# Patient Record
Sex: FEMALE | Race: WHITE | NOT HISPANIC OR LATINO | Employment: FULL TIME | URBAN - METROPOLITAN AREA
[De-identification: names, ages, dates, MRNs, and addresses within clinical notes are randomized per-mention and may not be internally consistent; named-entity substitution may affect disease eponyms.]

---

## 2017-06-20 ENCOUNTER — TRANSCRIBE ORDERS (OUTPATIENT)
Dept: ADMINISTRATIVE | Facility: HOSPITAL | Age: 52
End: 2017-06-20

## 2017-06-20 DIAGNOSIS — K31.89 DYSPEPSIA AND OTHER SPECIFIED DISORDERS OF FUNCTION OF STOMACH: ICD-10-CM

## 2017-06-20 DIAGNOSIS — R10.13 DYSPEPSIA AND OTHER SPECIFIED DISORDERS OF FUNCTION OF STOMACH: ICD-10-CM

## 2017-06-20 DIAGNOSIS — R10.11 ABDOMINAL PAIN, RIGHT UPPER QUADRANT: Primary | ICD-10-CM

## 2017-06-22 ENCOUNTER — HOSPITAL ENCOUNTER (OUTPATIENT)
Dept: ULTRASOUND IMAGING | Facility: HOSPITAL | Age: 52
Discharge: HOME | End: 2017-06-22
Attending: INTERNAL MEDICINE
Payer: COMMERCIAL

## 2017-06-22 DIAGNOSIS — R10.11 ABDOMINAL PAIN, RIGHT UPPER QUADRANT: ICD-10-CM

## 2017-06-22 DIAGNOSIS — R10.13 DYSPEPSIA AND OTHER SPECIFIED DISORDERS OF FUNCTION OF STOMACH: ICD-10-CM

## 2017-06-22 DIAGNOSIS — K31.89 DYSPEPSIA AND OTHER SPECIFIED DISORDERS OF FUNCTION OF STOMACH: ICD-10-CM

## 2017-06-22 PROCEDURE — 76705 ECHO EXAM OF ABDOMEN: CPT

## 2017-09-27 ENCOUNTER — TRANSCRIBE ORDERS (OUTPATIENT)
Dept: ADMINISTRATIVE | Facility: HOSPITAL | Age: 52
End: 2017-09-27

## 2017-09-27 DIAGNOSIS — E04.1 THYROID NODULE: Primary | ICD-10-CM

## 2017-09-29 ENCOUNTER — HOSPITAL ENCOUNTER (OUTPATIENT)
Dept: ULTRASOUND IMAGING | Facility: HOSPITAL | Age: 52
Discharge: HOME | End: 2017-09-29
Payer: COMMERCIAL

## 2017-09-29 DIAGNOSIS — E04.1 THYROID NODULE: ICD-10-CM

## 2017-09-29 PROCEDURE — 76536 US EXAM OF HEAD AND NECK: CPT

## 2018-06-27 ENCOUNTER — TELEPHONE (OUTPATIENT)
Dept: OBGYN CLINIC | Facility: HOSPITAL | Age: 53
End: 2018-06-27

## 2018-06-27 NOTE — TELEPHONE ENCOUNTER
Patient has not been seen since 2016  She does not have any recent BMP to check renal function  She can talk to her PCP about a refill  Please call the patient and let her know  Thank you

## 2018-06-27 NOTE — TELEPHONE ENCOUNTER
Pt contacted Call Center requested refill of their medication  Medication Name: Meloxicam      Dosage of Med: 7 5 mg      Remaining Medication: 3 pills      Pharmacy and Location: [facility] Correct in chart    Thank you

## 2018-08-10 ENCOUNTER — OFFICE VISIT (OUTPATIENT)
Dept: OBGYN CLINIC | Facility: CLINIC | Age: 53
End: 2018-08-10
Payer: COMMERCIAL

## 2018-08-10 VITALS
WEIGHT: 180 LBS | SYSTOLIC BLOOD PRESSURE: 137 MMHG | BODY MASS INDEX: 33.13 KG/M2 | DIASTOLIC BLOOD PRESSURE: 80 MMHG | HEART RATE: 77 BPM | HEIGHT: 62 IN

## 2018-08-10 DIAGNOSIS — M70.62 TROCHANTERIC BURSITIS OF LEFT HIP: Primary | ICD-10-CM

## 2018-08-10 DIAGNOSIS — M24.152 DEGENERATIVE TEAR OF ACETABULAR LABRUM OF LEFT HIP: ICD-10-CM

## 2018-08-10 PROCEDURE — 99213 OFFICE O/P EST LOW 20 MIN: CPT | Performed by: ORTHOPAEDIC SURGERY

## 2018-08-10 RX ORDER — MELOXICAM 15 MG/1
15 TABLET ORAL DAILY
COMMUNITY
End: 2020-10-07

## 2018-08-10 RX ORDER — MULTIVIT-MIN/IRON/FOLIC ACID/K 18-600-40
4000 CAPSULE ORAL DAILY
COMMUNITY
End: 2021-04-06 | Stop reason: DRUGHIGH

## 2018-08-10 NOTE — PROGRESS NOTES
45 y o female comes in for left hip pain  Patient does have a history of left labrum tear with hip dysplasia  Patient reports increased pain about a month ago localizing to the lateral aspect  Patient notes stabbing pain in the left hip when walking,standing or driving  She describes a popping episode with improved pain thereafter  Patient is currently taking meloxicam for pain  Review of Systems  Review of systems negative unless otherwise specified in HPI    Past Medical History  Past Medical History:   Diagnosis Date    Joint pain        Past Surgical History  Past Surgical History:   Procedure Laterality Date     SECTION N/A 104,5205       Current Medications  No current outpatient prescriptions on file prior to visit  No current facility-administered medications on file prior to visit  Recent Labs (HCT,HGB,PT,INR,ESR,CRP,GLU,HgA1C)  No results found for: HCT, HGB, WBC, PT, INR, ESR, CRP, GLUCOSE, HGBA1C      Physical exam  Left  hip:  · No gross deformity  · Tenderness to palpation trochanteric bursa  · Range of motion is full  · FADDIR test is mildly positive  · JOLYNN test is mildly positive  · Passive supine rotation test is mildly positive  · Straight leg raise against resistance is mildly positive  · 4/5 strength hip flexion    Constitutional:  Well-developed and well-nourished  Eyes:  Anicteric sclerae  Neck:  Supple  Lungs:  Unlabored breathing  Cardiovascular:  Capillary refill is less than 2 seconds  Skin:  Intact without erythema  Neurologic:  Sensation intact to light touch  Psychiatric:  Mood and affect are appropriate  Imaging  I have reviewed imagines to PACS  X-rays left hip 16: Moderate dysplasia with minimal degenerative changes  MRI left hip 10/10/16: Degenerative tear of the acetabular labrum  Procedure  N/A    Assessment/Plan:   45 y  o female degenerative rear of the acetabular labrum tear left  Activity as tolerated  Continue with anti inflammatories  Consider steroid injection (bursa or intra-articular as indicated) or referral to PT if pain worsens  Follow up PRN

## 2018-09-14 ENCOUNTER — OFFICE VISIT (OUTPATIENT)
Dept: OBGYN CLINIC | Facility: CLINIC | Age: 53
End: 2018-09-14
Payer: COMMERCIAL

## 2018-09-14 VITALS
HEART RATE: 81 BPM | SYSTOLIC BLOOD PRESSURE: 151 MMHG | DIASTOLIC BLOOD PRESSURE: 84 MMHG | WEIGHT: 180 LBS | HEIGHT: 62 IN | BODY MASS INDEX: 33.13 KG/M2

## 2018-09-14 DIAGNOSIS — M70.62 TROCHANTERIC BURSITIS OF LEFT HIP: Primary | ICD-10-CM

## 2018-09-14 PROCEDURE — 20610 DRAIN/INJ JOINT/BURSA W/O US: CPT | Performed by: ORTHOPAEDIC SURGERY

## 2018-09-14 PROCEDURE — 99214 OFFICE O/P EST MOD 30 MIN: CPT | Performed by: ORTHOPAEDIC SURGERY

## 2018-09-14 RX ORDER — BUPIVACAINE HYDROCHLORIDE 2.5 MG/ML
4 INJECTION, SOLUTION INFILTRATION; PERINEURAL
Status: COMPLETED | OUTPATIENT
Start: 2018-09-14 | End: 2018-09-14

## 2018-09-14 RX ORDER — METHYLPREDNISOLONE ACETATE 40 MG/ML
1 INJECTION, SUSPENSION INTRA-ARTICULAR; INTRALESIONAL; INTRAMUSCULAR; SOFT TISSUE
Status: COMPLETED | OUTPATIENT
Start: 2018-09-14 | End: 2018-09-14

## 2018-09-14 RX ORDER — MESALAMINE 400 MG/1
CAPSULE, DELAYED RELEASE ORAL
COMMUNITY
End: 2018-09-14

## 2018-09-14 RX ADMIN — BUPIVACAINE HYDROCHLORIDE 4 ML: 2.5 INJECTION, SOLUTION INFILTRATION; PERINEURAL at 15:30

## 2018-09-14 RX ADMIN — METHYLPREDNISOLONE ACETATE 1 ML: 40 INJECTION, SUSPENSION INTRA-ARTICULAR; INTRALESIONAL; INTRAMUSCULAR; SOFT TISSUE at 15:30

## 2018-09-14 NOTE — PROGRESS NOTES
Patient Name:  Maria Eugenia Isaac  MRN:  [id number]    Assessment & Plan    Left hip trochanteric bursitis  1  Cortisone injection performed today into the left hip trochanteric bursa  2  Activities as tolerated with modification to avoid pain  3  Follow-up as needed  Briefly discussed left hip image guided intra-articular cortisone injection with Radiology if pain persists distal   Patient may call to arrange this injection      Subjective    57-year-old female returns to the office today for follow-up regarding her left hip  She notes persistent left hip pain localized primarily to the lateral aspect  She notes radiation distally along the lateral thigh into the knee  Pain is worse with prolonged sitting and driving  Pain is improved with rest ice and heat  No numbness or tingling  No weakness or instability  No fevers or chills  General ROS:  Negative for fever, lethargy/malaise, or night sweats  Neurological ROS:  Negative for confusion or numbness/tingling  Objective    /84   Pulse 81   Ht 5' 2" (1 575 m)   Wt 81 6 kg (180 lb)   BMI 32 92 kg/m²     Left hip:  No gross deformity  No tenderness to palpation anterior hip  Tenderness to palpation noted trochanteric bursa  Hip range of motion is intact and nearly full in all planes with discomfort noted laterally with internal and external rotation  Pain noted laterally with JOLYNN and FADDIR test    Negative logroll test   Negative straight leg raise and resisted straight leg raise test   Sensation intact left lower extremity    Appropriate mood and affect    Large joint arthrocentesis  Date/Time: 9/14/2018 3:30 PM  Supporting Documentation  Indications: pain   Procedure Details  Location: hip - L greater trochanteric bursa  Needle size: 22 G  Ultrasound guidance: no  Approach: lateral  Medications administered: 4 mL bupivacaine 0 25 %; 1 mL methylPREDNISolone acetate 40 mg/mL    Patient tolerance: patient tolerated the procedure well with no immediate complications  Dressing:  Sterile dressing applied        Scribe Attestation    I,:   Nick Mchugh PA-C am acting as a scribe while in the presence of the attending physician :        I,:   Skylar Bland MD personally performed the services described in this documentation    as scribed in my presence :

## 2018-11-29 ENCOUNTER — TELEPHONE (OUTPATIENT)
Dept: OBGYN CLINIC | Facility: HOSPITAL | Age: 53
End: 2018-11-29

## 2018-11-29 DIAGNOSIS — M24.152 DEGENERATIVE TEAR OF ACETABULAR LABRUM OF LEFT HIP: Primary | ICD-10-CM

## 2018-11-29 NOTE — TELEPHONE ENCOUNTER
Patient is calling stating that the steroid injection she had on 9/14/18 only gave her short term relief  Patient is stating that the doctor told her if it didn't work he would send her for the us guided injection & that the patient does not need to make an appt to see him, that he would just put the order in her chart  Please let the patient know when the order is in the chart & directions on how to schedule   [phone]

## 2018-12-10 ENCOUNTER — HOSPITAL ENCOUNTER (OUTPATIENT)
Dept: RADIOLOGY | Facility: HOSPITAL | Age: 53
Discharge: HOME | End: 2018-12-10
Payer: COMMERCIAL

## 2018-12-10 DIAGNOSIS — M24.152 DEGENERATIVE TEAR OF ACETABULAR LABRUM OF LEFT HIP: ICD-10-CM

## 2018-12-10 PROCEDURE — 77002 NEEDLE LOCALIZATION BY XRAY: CPT

## 2018-12-10 PROCEDURE — 20610 DRAIN/INJ JOINT/BURSA W/O US: CPT

## 2018-12-10 RX ORDER — METHYLPREDNISOLONE ACETATE 80 MG/ML
80 INJECTION, SUSPENSION INTRA-ARTICULAR; INTRALESIONAL; INTRAMUSCULAR; SOFT TISSUE
Status: COMPLETED | OUTPATIENT
Start: 2018-12-10 | End: 2018-12-10

## 2018-12-10 RX ORDER — BUPIVACAINE HYDROCHLORIDE 2.5 MG/ML
30 INJECTION, SOLUTION EPIDURAL; INFILTRATION; INTRACAUDAL
Status: COMPLETED | OUTPATIENT
Start: 2018-12-10 | End: 2018-12-10

## 2018-12-10 RX ORDER — LIDOCAINE HYDROCHLORIDE 10 MG/ML
10 INJECTION, SOLUTION EPIDURAL; INFILTRATION; INTRACAUDAL; PERINEURAL
Status: COMPLETED | OUTPATIENT
Start: 2018-12-10 | End: 2018-12-10

## 2018-12-10 RX ADMIN — BUPIVACAINE HYDROCHLORIDE 3 ML: 2.5 INJECTION, SOLUTION EPIDURAL; INFILTRATION; INTRACAUDAL at 12:41

## 2018-12-10 RX ADMIN — LIDOCAINE HYDROCHLORIDE 8 ML: 10 INJECTION, SOLUTION EPIDURAL; INFILTRATION; INTRACAUDAL; PERINEURAL at 12:43

## 2018-12-10 RX ADMIN — IOHEXOL 3 ML: 300 INJECTION, SOLUTION INTRAVENOUS at 12:41

## 2018-12-10 RX ADMIN — METHYLPREDNISOLONE ACETATE 80 MG: 80 INJECTION, SUSPENSION INTRA-ARTICULAR; INTRALESIONAL; INTRAMUSCULAR; SOFT TISSUE at 12:43

## 2019-02-12 ENCOUNTER — TRANSCRIBE ORDERS (OUTPATIENT)
Dept: ADMINISTRATIVE | Facility: HOSPITAL | Age: 54
End: 2019-02-12

## 2019-02-12 DIAGNOSIS — R10.9 STOMACH ACHE: Primary | ICD-10-CM

## 2019-02-14 ENCOUNTER — HOSPITAL ENCOUNTER (OUTPATIENT)
Dept: ULTRASOUND IMAGING | Facility: HOSPITAL | Age: 54
Discharge: HOME | End: 2019-02-14
Attending: SURGERY
Payer: COMMERCIAL

## 2019-02-14 DIAGNOSIS — R10.9 STOMACH ACHE: ICD-10-CM

## 2019-02-14 PROCEDURE — 76705 ECHO EXAM OF ABDOMEN: CPT

## 2019-05-08 ENCOUNTER — APPOINTMENT (EMERGENCY)
Dept: RADIOLOGY | Facility: HOSPITAL | Age: 54
End: 2019-05-08
Payer: COMMERCIAL

## 2019-05-08 ENCOUNTER — HOSPITAL ENCOUNTER (EMERGENCY)
Facility: HOSPITAL | Age: 54
Discharge: HOME | End: 2019-05-08
Attending: EMERGENCY MEDICINE | Admitting: EMERGENCY MEDICINE
Payer: COMMERCIAL

## 2019-05-08 VITALS
DIASTOLIC BLOOD PRESSURE: 67 MMHG | OXYGEN SATURATION: 97 % | TEMPERATURE: 98 F | RESPIRATION RATE: 16 BRPM | SYSTOLIC BLOOD PRESSURE: 161 MMHG | HEART RATE: 70 BPM

## 2019-05-08 DIAGNOSIS — R42 EPISODE OF DIZZINESS: Primary | ICD-10-CM

## 2019-05-08 LAB
ALBUMIN SERPL BCP-MCNC: 3.6 G/DL (ref 3.5–5)
ALP SERPL-CCNC: 86 U/L (ref 46–116)
ALT SERPL W P-5'-P-CCNC: 35 U/L (ref 12–78)
ANION GAP SERPL CALCULATED.3IONS-SCNC: 10 MMOL/L (ref 4–13)
AST SERPL W P-5'-P-CCNC: 20 U/L (ref 5–45)
ATRIAL RATE: 81 BPM
BASOPHILS # BLD AUTO: 0.05 THOUSANDS/ΜL (ref 0–0.1)
BASOPHILS NFR BLD AUTO: 1 % (ref 0–1)
BILIRUB SERPL-MCNC: 0.2 MG/DL (ref 0.2–1)
BUN SERPL-MCNC: 16 MG/DL (ref 5–25)
CALCIUM SERPL-MCNC: 8.7 MG/DL (ref 8.3–10.1)
CHLORIDE SERPL-SCNC: 105 MMOL/L (ref 100–108)
CO2 SERPL-SCNC: 26 MMOL/L (ref 21–32)
CREAT SERPL-MCNC: 1.11 MG/DL (ref 0.6–1.3)
EOSINOPHIL # BLD AUTO: 0.09 THOUSAND/ΜL (ref 0–0.61)
EOSINOPHIL NFR BLD AUTO: 1 % (ref 0–6)
ERYTHROCYTE [DISTWIDTH] IN BLOOD BY AUTOMATED COUNT: 11.8 % (ref 11.6–15.1)
GFR SERPL CREATININE-BSD FRML MDRD: 56 ML/MIN/1.73SQ M
GLUCOSE SERPL-MCNC: 162 MG/DL (ref 65–140)
HCT VFR BLD AUTO: 41.8 % (ref 34.8–46.1)
HGB BLD-MCNC: 13.9 G/DL (ref 11.5–15.4)
IMM GRANULOCYTES # BLD AUTO: 0.02 THOUSAND/UL (ref 0–0.2)
IMM GRANULOCYTES NFR BLD AUTO: 0 % (ref 0–2)
LYMPHOCYTES # BLD AUTO: 1.71 THOUSANDS/ΜL (ref 0.6–4.47)
LYMPHOCYTES NFR BLD AUTO: 26 % (ref 14–44)
MCH RBC QN AUTO: 32 PG (ref 26.8–34.3)
MCHC RBC AUTO-ENTMCNC: 33.3 G/DL (ref 31.4–37.4)
MCV RBC AUTO: 96 FL (ref 82–98)
MONOCYTES # BLD AUTO: 0.44 THOUSAND/ΜL (ref 0.17–1.22)
MONOCYTES NFR BLD AUTO: 7 % (ref 4–12)
NEUTROPHILS # BLD AUTO: 4.19 THOUSANDS/ΜL (ref 1.85–7.62)
NEUTS SEG NFR BLD AUTO: 65 % (ref 43–75)
NRBC BLD AUTO-RTO: 0 /100 WBCS
P AXIS: 47 DEGREES
PLATELET # BLD AUTO: 232 THOUSANDS/UL (ref 149–390)
PMV BLD AUTO: 9.8 FL (ref 8.9–12.7)
POTASSIUM SERPL-SCNC: 3.6 MMOL/L (ref 3.5–5.3)
PR INTERVAL: 182 MS
PROT SERPL-MCNC: 6.9 G/DL (ref 6.4–8.2)
QRS AXIS: 5 DEGREES
QRSD INTERVAL: 78 MS
QT INTERVAL: 356 MS
QTC INTERVAL: 406 MS
RBC # BLD AUTO: 4.35 MILLION/UL (ref 3.81–5.12)
SODIUM SERPL-SCNC: 141 MMOL/L (ref 136–145)
T WAVE AXIS: 37 DEGREES
TROPONIN I SERPL-MCNC: <0.02 NG/ML
TROPONIN I SERPL-MCNC: <0.02 NG/ML
TSH SERPL DL<=0.05 MIU/L-ACNC: 3.14 UIU/ML (ref 0.36–3.74)
VENTRICULAR RATE: 78 BPM
WBC # BLD AUTO: 6.5 THOUSAND/UL (ref 4.31–10.16)

## 2019-05-08 PROCEDURE — 85025 COMPLETE CBC W/AUTO DIFF WBC: CPT | Performed by: PHYSICIAN ASSISTANT

## 2019-05-08 PROCEDURE — 96360 HYDRATION IV INFUSION INIT: CPT

## 2019-05-08 PROCEDURE — 93005 ELECTROCARDIOGRAM TRACING: CPT

## 2019-05-08 PROCEDURE — 80053 COMPREHEN METABOLIC PANEL: CPT | Performed by: PHYSICIAN ASSISTANT

## 2019-05-08 PROCEDURE — 84443 ASSAY THYROID STIM HORMONE: CPT | Performed by: PHYSICIAN ASSISTANT

## 2019-05-08 PROCEDURE — 84484 ASSAY OF TROPONIN QUANT: CPT | Performed by: PHYSICIAN ASSISTANT

## 2019-05-08 PROCEDURE — 99284 EMERGENCY DEPT VISIT MOD MDM: CPT

## 2019-05-08 PROCEDURE — 99283 EMERGENCY DEPT VISIT LOW MDM: CPT | Performed by: PHYSICIAN ASSISTANT

## 2019-05-08 PROCEDURE — 71046 X-RAY EXAM CHEST 2 VIEWS: CPT

## 2019-05-08 PROCEDURE — 93010 ELECTROCARDIOGRAM REPORT: CPT | Performed by: INTERNAL MEDICINE

## 2019-05-08 PROCEDURE — 36415 COLL VENOUS BLD VENIPUNCTURE: CPT | Performed by: PHYSICIAN ASSISTANT

## 2019-05-08 RX ADMIN — SODIUM CHLORIDE 1000 ML: 0.9 INJECTION, SOLUTION INTRAVENOUS at 02:36

## 2019-08-22 ENCOUNTER — TRANSCRIBE ORDERS (OUTPATIENT)
Dept: ADMINISTRATIVE | Facility: HOSPITAL | Age: 54
End: 2019-08-22

## 2019-08-22 DIAGNOSIS — K82.9 DISEASE OF GALLBLADDER: Primary | ICD-10-CM

## 2019-12-07 ENCOUNTER — HOSPITAL ENCOUNTER (OUTPATIENT)
Dept: ULTRASOUND IMAGING | Facility: HOSPITAL | Age: 54
Discharge: HOME | End: 2019-12-07
Attending: SURGERY
Payer: COMMERCIAL

## 2019-12-07 DIAGNOSIS — K82.9 DISEASE OF GALLBLADDER: ICD-10-CM

## 2019-12-07 PROCEDURE — 76705 ECHO EXAM OF ABDOMEN: CPT

## 2020-08-19 ENCOUNTER — TELEPHONE (OUTPATIENT)
Dept: OBGYN CLINIC | Facility: HOSPITAL | Age: 55
End: 2020-08-19

## 2020-08-19 NOTE — TELEPHONE ENCOUNTER
As it has been well over 1 year would be preferable to have the patient come in repeat evaluation see if she needs the greater trochanteric injection versus the intra-articular injection

## 2020-08-19 NOTE — TELEPHONE ENCOUNTER
Patient sees Dr Namrata Pimentel for her left hip  In Sept 2018 she had a cortisone injection and in Dec 2018 she had a FL HIP injection  She's asking for another FL injection  Does she have to come in for a f/u visit first or can an order for the injection be placed?     Callback #[phone]

## 2020-08-28 ENCOUNTER — OFFICE VISIT (OUTPATIENT)
Dept: OBGYN CLINIC | Facility: CLINIC | Age: 55
End: 2020-08-28
Payer: COMMERCIAL

## 2020-08-28 VITALS
SYSTOLIC BLOOD PRESSURE: 139 MMHG | HEIGHT: 62 IN | BODY MASS INDEX: 33.13 KG/M2 | WEIGHT: 180 LBS | HEART RATE: 78 BPM | DIASTOLIC BLOOD PRESSURE: 83 MMHG

## 2020-08-28 DIAGNOSIS — M24.152 DEGENERATIVE TEAR OF ACETABULAR LABRUM OF LEFT HIP: Primary | ICD-10-CM

## 2020-08-28 PROCEDURE — 99214 OFFICE O/P EST MOD 30 MIN: CPT | Performed by: ORTHOPAEDIC SURGERY

## 2020-08-28 NOTE — PROGRESS NOTES
Patient Name:  Toro Gutierrez  MRN:  [id number]    Assessment & Plan     Left hip degenerative labral tear with recent exacerbation  1  Recommended repeat intra-articular corticosteroid injection left hip   2  OTC NSAIDs and activity modification as needed  3  Follow up on an as needed basis    History of the Present Illness     61 y/o female presents today with chief complaint of left hip pain  She reports recurrent pain in her left hip a couple months ago, worsening over that time  Patient reports complete resolution of her left hip pain after an intra-articular corticosteroid injection on 12/10/2018  Pain localizes to the anterior groin and are exacerbated by weight bearing activity  No recent treatment since the pain recurred  General ROS:  Negative for fever or chills  Neurological ROS:  Negative for numbness or tingling  Physical Exam     /83   Pulse 78   Ht 5' 2" (1 575 m)   Wt 81 6 kg (180 lb)   BMI 32 92 kg/m²     Left  hip:  Tenderness:  None  Range of motion:  Flexion:  110  ER:  60  IR:  20  Strength:  Flexion:  5/5  Abduction:  5/5  Log roll test:  Negative  Impingement test:  Negative  JOLYNN test:  Positive  SLR against resistance:  Positive    Eyes:  Anicteric sclerae  Neck:  Supple  Lungs:  Normal respiratory effort  Cardiovascular:  No lower extremity edema  Skin:  Intact without erythema  Neurologic:  Sensation grossly intact to light touch  Psychiatric:  Mood and affect are appropriate        Social History     Tobacco Use    Smoking status: Current Every Day Smoker     Packs/day: 0 50     Years: 30 00     Pack years: 15 00     Types: Cigarettes    Smokeless tobacco: Never Used    Tobacco comment: started at age 23 per Ubaldo Binning   Substance Use Topics    Alcohol use: Yes     Comment: occasional    Drug use: Not on file       Scribe Attestation    I,:   Ibis Gonzalez am acting as a scribe while in the presence of the attending physician :        I,:   Jodi Mohan MD personally performed the services described in this documentation    as scribed in my presence :

## 2020-09-14 ENCOUNTER — HOSPITAL ENCOUNTER (OUTPATIENT)
Dept: RADIOLOGY | Facility: HOSPITAL | Age: 55
Discharge: HOME | End: 2020-09-14
Attending: ORTHOPAEDIC SURGERY | Admitting: RADIOLOGY
Payer: COMMERCIAL

## 2020-09-14 DIAGNOSIS — M24.152 DEGENERATIVE TEAR OF ACETABULAR LABRUM OF LEFT HIP: ICD-10-CM

## 2020-09-14 PROCEDURE — 77002 NEEDLE LOCALIZATION BY XRAY: CPT

## 2020-09-14 PROCEDURE — 20610 DRAIN/INJ JOINT/BURSA W/O US: CPT

## 2020-09-14 RX ORDER — METHYLPREDNISOLONE ACETATE 80 MG/ML
80 INJECTION, SUSPENSION INTRA-ARTICULAR; INTRALESIONAL; INTRAMUSCULAR; SOFT TISSUE
Status: COMPLETED | OUTPATIENT
Start: 2020-09-14 | End: 2020-09-14

## 2020-09-14 RX ORDER — LIDOCAINE HYDROCHLORIDE 10 MG/ML
10 INJECTION, SOLUTION EPIDURAL; INFILTRATION; INTRACAUDAL; PERINEURAL
Status: COMPLETED | OUTPATIENT
Start: 2020-09-14 | End: 2020-09-14

## 2020-09-14 RX ORDER — BUPIVACAINE HYDROCHLORIDE 2.5 MG/ML
10 INJECTION, SOLUTION EPIDURAL; INFILTRATION; INTRACAUDAL
Status: COMPLETED | OUTPATIENT
Start: 2020-09-14 | End: 2020-09-14

## 2020-09-14 RX ADMIN — METHYLPREDNISOLONE ACETATE 80 MG: 80 INJECTION, SUSPENSION INTRA-ARTICULAR; INTRALESIONAL; INTRAMUSCULAR; SOFT TISSUE at 14:14

## 2020-09-14 RX ADMIN — IOHEXOL 2 ML: 300 INJECTION, SOLUTION INTRAVENOUS at 14:13

## 2020-09-14 RX ADMIN — LIDOCAINE HYDROCHLORIDE 8 ML: 10 INJECTION, SOLUTION EPIDURAL; INFILTRATION; INTRACAUDAL; PERINEURAL at 14:14

## 2020-09-14 RX ADMIN — BUPIVACAINE HYDROCHLORIDE 2 ML: 2.5 INJECTION, SOLUTION EPIDURAL; INFILTRATION; INTRACAUDAL; PERINEURAL at 14:14

## 2020-10-06 DIAGNOSIS — E78.5 DYSLIPIDEMIA: Primary | ICD-10-CM

## 2020-10-06 RX ORDER — ROSUVASTATIN CALCIUM 10 MG/1
TABLET, COATED ORAL
Qty: 30 TABLET | Refills: 0 | Status: SHIPPED | OUTPATIENT
Start: 2020-10-06 | End: 2020-11-03

## 2020-10-07 ENCOUNTER — TELEPHONE (OUTPATIENT)
Dept: FAMILY MEDICINE CLINIC | Facility: CLINIC | Age: 55
End: 2020-10-07

## 2020-10-07 ENCOUNTER — TELEMEDICINE (OUTPATIENT)
Dept: FAMILY MEDICINE CLINIC | Facility: CLINIC | Age: 55
End: 2020-10-07
Payer: COMMERCIAL

## 2020-10-07 VITALS — BODY MASS INDEX: 33.13 KG/M2 | HEIGHT: 62 IN | WEIGHT: 180 LBS

## 2020-10-07 DIAGNOSIS — J01.00 ACUTE NON-RECURRENT MAXILLARY SINUSITIS: Primary | ICD-10-CM

## 2020-10-07 DIAGNOSIS — B37.3 CANDIDIASIS, VAGINA: ICD-10-CM

## 2020-10-07 PROCEDURE — 99213 OFFICE O/P EST LOW 20 MIN: CPT | Performed by: FAMILY MEDICINE

## 2020-10-07 PROCEDURE — 3725F SCREEN DEPRESSION PERFORMED: CPT | Performed by: FAMILY MEDICINE

## 2020-10-07 RX ORDER — AMOXICILLIN 875 MG/1
875 TABLET, COATED ORAL 2 TIMES DAILY
Qty: 20 TABLET | Refills: 0 | Status: SHIPPED | OUTPATIENT
Start: 2020-10-07 | End: 2020-10-17

## 2020-10-07 RX ORDER — FLUCONAZOLE 150 MG/1
150 TABLET ORAL ONCE
Qty: 1 TABLET | Refills: 0 | Status: SHIPPED | OUTPATIENT
Start: 2020-10-07 | End: 2020-10-07

## 2020-11-03 DIAGNOSIS — E78.5 DYSLIPIDEMIA: ICD-10-CM

## 2020-11-03 RX ORDER — ROSUVASTATIN CALCIUM 10 MG/1
TABLET, COATED ORAL
Qty: 30 TABLET | Refills: 0 | Status: SHIPPED | OUTPATIENT
Start: 2020-11-03 | End: 2021-09-03 | Stop reason: ALTCHOICE

## 2020-12-21 ENCOUNTER — TELEPHONE (OUTPATIENT)
Dept: FAMILY MEDICINE CLINIC | Facility: CLINIC | Age: 55
End: 2020-12-21

## 2020-12-22 ENCOUNTER — TELEMEDICINE (OUTPATIENT)
Dept: FAMILY MEDICINE CLINIC | Facility: CLINIC | Age: 55
End: 2020-12-22
Payer: COMMERCIAL

## 2020-12-22 DIAGNOSIS — Z86.39 HISTORY OF THYROID DISORDER: ICD-10-CM

## 2020-12-22 DIAGNOSIS — E03.9 ACQUIRED HYPOTHYROIDISM: Primary | ICD-10-CM

## 2020-12-22 DIAGNOSIS — E78.5 DYSLIPIDEMIA: ICD-10-CM

## 2020-12-22 PROCEDURE — 99213 OFFICE O/P EST LOW 20 MIN: CPT | Performed by: FAMILY MEDICINE

## 2020-12-22 RX ORDER — PRAVASTATIN SODIUM 40 MG
40 TABLET ORAL DAILY
Qty: 30 TABLET | Refills: 3 | Status: SHIPPED | OUTPATIENT
Start: 2020-12-22 | End: 2021-09-03 | Stop reason: ALTCHOICE

## 2021-02-11 ENCOUNTER — PREP FOR PROCEDURE (OUTPATIENT)
Dept: GASTROENTEROLOGY | Facility: CLINIC | Age: 56
End: 2021-02-11

## 2021-02-11 ENCOUNTER — TELEPHONE (OUTPATIENT)
Dept: GASTROENTEROLOGY | Facility: CLINIC | Age: 56
End: 2021-02-11

## 2021-02-11 DIAGNOSIS — Z12.11 COLON CANCER SCREENING: Primary | ICD-10-CM

## 2021-02-11 DIAGNOSIS — Z80.0 FAMILY HISTORY OF COLON CANCER IN FATHER: ICD-10-CM

## 2021-02-11 DIAGNOSIS — Z86.010 HISTORY OF COLON POLYPS: Primary | ICD-10-CM

## 2021-02-11 RX ORDER — SODIUM, POTASSIUM,MAG SULFATES 17.5-3.13G
177 SOLUTION, RECONSTITUTED, ORAL ORAL SEE ADMIN INSTRUCTIONS
Qty: 1 BOTTLE | Refills: 0 | Status: SHIPPED | OUTPATIENT
Start: 2021-02-11 | End: 2021-12-29 | Stop reason: ALTCHOICE

## 2021-02-28 LAB
ALBUMIN SERPL-MCNC: 4.1 G/DL (ref 3.6–5.1)
ALBUMIN/GLOB SERPL: 1.6 (CALC) (ref 1–2.5)
ALP SERPL-CCNC: 80 U/L (ref 37–153)
ALT SERPL-CCNC: 16 U/L (ref 6–29)
AST SERPL-CCNC: 17 U/L (ref 10–35)
BILIRUB SERPL-MCNC: 1 MG/DL (ref 0.2–1.2)
BUN SERPL-MCNC: 13 MG/DL (ref 7–25)
BUN/CREAT SERPL: NORMAL (CALC) (ref 6–22)
CALCIUM SERPL-MCNC: 9.4 MG/DL (ref 8.6–10.4)
CHLORIDE SERPL-SCNC: 104 MMOL/L (ref 98–110)
CHOLEST SERPL-MCNC: 184 MG/DL
CHOLEST/HDLC SERPL: 4.6 (CALC)
CO2 SERPL-SCNC: 25 MMOL/L (ref 20–32)
CREAT SERPL-MCNC: 0.99 MG/DL (ref 0.5–1.05)
GLOBULIN SER CALC-MCNC: 2.5 G/DL (CALC) (ref 1.9–3.7)
GLUCOSE SERPL-MCNC: 92 MG/DL (ref 65–99)
HDLC SERPL-MCNC: 40 MG/DL
LDLC SERPL CALC-MCNC: 117 MG/DL (CALC)
NONHDLC SERPL-MCNC: 144 MG/DL (CALC)
POTASSIUM SERPL-SCNC: 4.4 MMOL/L (ref 3.5–5.3)
PROT SERPL-MCNC: 6.6 G/DL (ref 6.1–8.1)
SL AMB EGFR AFRICAN AMERICAN: 73 ML/MIN/1.73M2
SL AMB EGFR NON AFRICAN AMERICAN: 63 ML/MIN/1.73M2
SODIUM SERPL-SCNC: 139 MMOL/L (ref 135–146)
TRIGL SERPL-MCNC: 157 MG/DL
TSH SERPL-ACNC: 1.9 MIU/L (ref 0.4–4.5)

## 2021-03-09 ENCOUNTER — TELEPHONE (OUTPATIENT)
Dept: GASTROENTEROLOGY | Facility: CLINIC | Age: 56
End: 2021-03-09

## 2021-03-09 NOTE — TELEPHONE ENCOUNTER
LMOM for pt to call us back to get her procedure rescheduled  Had to be cancelled as Dr Kimi Summers will not be in the office this day  If she calls back please get her rescheduled

## 2021-04-06 RX ORDER — CHOLECALCIFEROL (VITAMIN D3) 125 MCG
CAPSULE ORAL
COMMUNITY

## 2021-04-08 ENCOUNTER — TELEPHONE (OUTPATIENT)
Dept: FAMILY MEDICINE CLINIC | Facility: CLINIC | Age: 56
End: 2021-04-08

## 2021-04-08 ENCOUNTER — HOSPITAL ENCOUNTER (OUTPATIENT)
Dept: RADIOLOGY | Facility: HOSPITAL | Age: 56
Discharge: HOME | End: 2021-04-08
Attending: FAMILY MEDICINE
Payer: COMMERCIAL

## 2021-04-08 ENCOUNTER — TELEPHONE (OUTPATIENT)
Dept: UROLOGY | Facility: MEDICAL CENTER | Age: 56
End: 2021-04-08

## 2021-04-08 DIAGNOSIS — N20.0 KIDNEY STONES: ICD-10-CM

## 2021-04-08 DIAGNOSIS — N20.0 KIDNEY STONES: Primary | ICD-10-CM

## 2021-04-08 PROCEDURE — 74018 RADEX ABDOMEN 1 VIEW: CPT

## 2021-04-08 NOTE — TELEPHONE ENCOUNTER
Supposed to have her colonoscopy tomorrow and start her prep today for that procedure  [facility] because pain and discomfort in her right side  They did a urine test, came back fine  They told her where it hurts its typical of kidney stone  So patient is looking for advice whether she should do the prep for the colonoscopy tomorrow or what should she do? Melissa said she is also going to call the gastro doctor to get advice on this situation        Patient ph # [phone]

## 2021-04-08 NOTE — TELEPHONE ENCOUNTER
Npt calling for appointment right flank pain,states she presented [facility] urine negative infection no other testing,I asked her to contact her pcp to order radiology testing she will call back with information

## 2021-04-08 NOTE — TELEPHONE ENCOUNTER
Complaint/Diagnosis:Right Flank Pain    Insurance:BC/BCS PPO    History of Cancer:no    Previous urologist:no    Outside testing/where:[facility] [address]    If yes,what kind:urine/negative    Records requested/where:    Preferred location:[place]

## 2021-04-08 NOTE — TELEPHONE ENCOUNTER
GI advised patient to cancel colonoscopy  Patient called to schedule with urology and they told her it will be 12 weeks to be seen w/o any type of testing  Was advised for PCP to order a KUB  Can you order that for her?

## 2021-04-09 ENCOUNTER — HOSPITAL ENCOUNTER (OUTPATIENT)
Dept: GASTROENTEROLOGY | Facility: AMBULATORY SURGERY CENTER | Age: 56
Discharge: HOME | End: 2021-04-09

## 2021-04-09 NOTE — TELEPHONE ENCOUNTER
Spoke to patient and scheduled for 4/16/2021 with [facility] in [place]  Patient repeats back appointment information and address  Discussed ER precautions and knows to call office with questions or concerns

## 2021-04-16 ENCOUNTER — OFFICE VISIT (OUTPATIENT)
Dept: UROLOGY | Facility: CLINIC | Age: 56
End: 2021-04-16
Payer: COMMERCIAL

## 2021-04-16 VITALS
BODY MASS INDEX: 33.13 KG/M2 | DIASTOLIC BLOOD PRESSURE: 74 MMHG | HEART RATE: 74 BPM | HEIGHT: 62 IN | WEIGHT: 180 LBS | SYSTOLIC BLOOD PRESSURE: 118 MMHG

## 2021-04-16 DIAGNOSIS — R10.9 FLANK PAIN: Primary | ICD-10-CM

## 2021-04-16 LAB
BACTERIA UR QL AUTO: ABNORMAL /HPF
BILIRUB UR QL STRIP: NEGATIVE
CLARITY UR: CLEAR
COLOR UR: YELLOW
GLUCOSE UR STRIP-MCNC: NEGATIVE MG/DL
HGB UR QL STRIP.AUTO: ABNORMAL
KETONES UR STRIP-MCNC: NEGATIVE MG/DL
LEUKOCYTE ESTERASE UR QL STRIP: NEGATIVE
NITRITE UR QL STRIP: NEGATIVE
NON-SQ EPI CELLS URNS QL MICRO: ABNORMAL /HPF
PH UR STRIP.AUTO: 6 [PH]
PROT UR STRIP-MCNC: NEGATIVE MG/DL
RBC #/AREA URNS AUTO: ABNORMAL /HPF
SL AMB  POCT GLUCOSE, UA: ABNORMAL
SL AMB LEUKOCYTE ESTERASE,UA: ABNORMAL
SL AMB POCT BILIRUBIN,UA: ABNORMAL
SL AMB POCT BLOOD,UA: ABNORMAL
SL AMB POCT CLARITY,UA: CLEAR
SL AMB POCT COLOR,UA: ABNORMAL
SL AMB POCT KETONES,UA: ABNORMAL
SL AMB POCT NITRITE,UA: ABNORMAL
SL AMB POCT PH,UA: 7
SL AMB POCT SPECIFIC GRAVITY,UA: 1.02
SL AMB POCT URINE PROTEIN: ABNORMAL
SL AMB POCT UROBILINOGEN: 0.2
SP GR UR STRIP.AUTO: 1.01 (ref 1–1.03)
UROBILINOGEN UR QL STRIP.AUTO: 0.2 E.U./DL
WBC #/AREA URNS AUTO: ABNORMAL /HPF

## 2021-04-16 PROCEDURE — 81001 URINALYSIS AUTO W/SCOPE: CPT | Performed by: PHYSICIAN ASSISTANT

## 2021-04-16 PROCEDURE — 81003 URINALYSIS AUTO W/O SCOPE: CPT | Performed by: PHYSICIAN ASSISTANT

## 2021-04-16 PROCEDURE — 3008F BODY MASS INDEX DOCD: CPT | Performed by: PHYSICIAN ASSISTANT

## 2021-04-16 PROCEDURE — 87086 URINE CULTURE/COLONY COUNT: CPT | Performed by: PHYSICIAN ASSISTANT

## 2021-04-16 PROCEDURE — 99203 OFFICE O/P NEW LOW 30 MIN: CPT | Performed by: PHYSICIAN ASSISTANT

## 2021-04-16 NOTE — PROGRESS NOTES
1  Flank pain  POCT urine dip auto non-scope    CT renal stone study abdomen pelvis wo contrast    Urinalysis with microscopic    Urine culture       Assessment and plan:       1  Right flank pain  -  I reviewed with the patient that her x-rays negative for any urinary tract calculi however this does not rule out an obstructing stone  -  I did review with the patient that her urine however in the office today is negative for hematuria or infection  I reviewed with her conservative measures with hydration, alternate Tylenol/ 8 ibuprofen, and heating pad  If her pain persists over the next few days we can obtain CT stone stone study for further evaluation  Patient was encouraged to contact us sooner with any worsening symptoms  All questions answered  Addy Martinez PA-C      Chief Complaint     Flank pain    History of Present Illness     Gabriel Orozco Wolcott is a 60 y o  Female presenting today as a new patient for flank pain  Patient was seen recently in urgent care secondary to right flank pain  She denies any precipitating events  States that the right flank pain was quite severe when it began however it is improving slightly over the past week  Was having some urinary frequency and urgency however denies any dysuria, gross hematuria, referral pain, nausea, vomiting, fevers, or chills  Denies any dietary factors that her exacerbating her urinary symptoms  She can feel the pain while at rest and with movement  Denies any previous history of  surgical manipulation  Patient had a recent KUB (4/08/2021) was negative for any radiopaque urinary tract calculi  Urine dip in the office today is leukocyte, nitrite, and blood negative      Laboratory     Lab Results   Component Value Date    CREATININE 0 99 02/27/2021       Review of Systems     Review of Systems   Constitutional: Negative for activity change, appetite change, chills, diaphoresis, fatigue, fever and unexpected weight change  Respiratory: Negative for chest tightness and shortness of breath  Cardiovascular: Negative for chest pain, palpitations and leg swelling  Gastrointestinal: Negative for abdominal distention, abdominal pain, constipation, diarrhea, nausea and vomiting  Genitourinary: Positive for flank pain  Negative for decreased urine volume, difficulty urinating, dysuria, enuresis, frequency, genital sores, hematuria and urgency  Musculoskeletal: Negative for back pain, gait problem and myalgias  Skin: Negative for color change, pallor, rash and wound  Psychiatric/Behavioral: Negative for behavioral problems  The patient is not nervous/anxious  Allergies     No Known Allergies    Physical Exam     Physical Exam  Constitutional:       General: She is not in acute distress  Appearance: Normal appearance  She is normal weight  She is not ill-appearing, toxic-appearing or diaphoretic  HENT:      Head: Normocephalic and atraumatic  Eyes:      General:         Right eye: No discharge  Left eye: No discharge  Conjunctiva/sclera: Conjunctivae normal    Pulmonary:      Effort: Pulmonary effort is normal  No respiratory distress  Musculoskeletal: Normal range of motion  General: No swelling or tenderness  Skin:     General: Skin is warm and dry  Coloration: Skin is not jaundiced or pale  Neurological:      General: No focal deficit present  Mental Status: She is alert and oriented to person, place, and time  Psychiatric:         Mood and Affect: Mood normal          Behavior: Behavior normal          Thought Content:  Thought content normal        Vital Signs     Vitals:    04/16/21 1445   BP: 118/74   Pulse: 74   Weight: 81 6 kg (180 lb)   Height: 5' 2" (1 575 m)         Current Medications       Current Outpatient Medications:     Cholecalciferol (Vitamin D) 125 MCG (5000 UT) CAPS, Take by mouth, Disp: , Rfl:     co-enzyme Q-10 30 MG capsule, Take 30 mg by mouth 3 (three) times a day, Disp: , Rfl:     Na Sulfate-K Sulfate-Mg Sulf (Suprep Bowel Prep Kit) 17 5-3 13-1 6 GM/177ML SOLN, Take 177 mL by mouth see administration instructions Take as directed day prior to procedure (Patient not taking: Reported on 2021), Disp: 1 Bottle, Rfl: 0    pravastatin (PRAVACHOL) 40 mg tablet, Take 1 tablet (40 mg total) by mouth daily (Patient not taking: Reported on 2021), Disp: 30 tablet, Rfl: 3    rosuvastatin (CRESTOR) 10 MG tablet, TAKE 1 TABLET BY MOUTH EVERY DAY (Patient not taking: Reported on 2021), Disp: 30 tablet, Rfl: 0      Active Problems     Patient Active Problem List   Diagnosis    Abnormal MRI of head    Benign paroxysmal positional vertigo    Colitis    Degenerative tear of acetabular labrum of left hip    Right patellofemoral syndrome    Trochanteric bursitis of left hip    Acute non-recurrent maxillary sinusitis    Dyslipidemia    History of thyroid disorder         Past Medical History     Past Medical History:   Diagnosis Date    Colon polyp     GERD (gastroesophageal reflux disease)     21 under control at this time, no meds    HL (hearing loss)     Joint pain     left hip labral tear    Tinnitus     Ulcerative colitis ([address] )     21 no meds at this time    Vertigo          Surgical History     Past Surgical History:   Procedure Laterality Date     SECTION N/A 136,3112    COLONOSCOPY  2017    COLONOSCOPY      FL INJECTION LEFT HIP (NON ARTHROGRAM)  12/10/2018    FL INJECTION LEFT HIP (NON ARTHROGRAM)  2020    MAMMO (HISTORICAL)  2020         Family History     Family History   Problem Relation Age of Onset    Heart disease Mother     Diabetes Mother     Heart disease Father     Colon cancer Father     Breast cancer Sister     Thyroid disease Brother          Social History     Social History       Radiology

## 2021-04-18 LAB — BACTERIA UR CULT: NORMAL

## 2021-04-22 ENCOUNTER — HOSPITAL ENCOUNTER (OUTPATIENT)
Dept: CT IMAGING | Facility: HOSPITAL | Age: 56
Discharge: HOME | End: 2021-04-22
Payer: COMMERCIAL

## 2021-04-22 DIAGNOSIS — R10.9 FLANK PAIN: ICD-10-CM

## 2021-04-22 PROCEDURE — 74176 CT ABD & PELVIS W/O CONTRAST: CPT

## 2021-04-22 PROCEDURE — G1004 CDSM NDSC: HCPCS

## 2021-04-27 ENCOUNTER — TELEPHONE (OUTPATIENT)
Dept: UROLOGY | Facility: CLINIC | Age: 56
End: 2021-04-27

## 2021-04-27 NOTE — TELEPHONE ENCOUNTER
----- Message from Kris Woodward PA-C sent at 4/27/2021 12:52 PM EDT -----  Please let the patient know the good news that her CT is negative for any obstructing stones  Follow up with Urology as scheduled  Thank you

## 2021-09-02 PROBLEM — Z00.00 ANNUAL PHYSICAL EXAM: Status: ACTIVE | Noted: 2021-09-02

## 2021-09-02 PROBLEM — E66.811 CLASS 1 OBESITY DUE TO EXCESS CALORIES WITH SERIOUS COMORBIDITY AND BODY MASS INDEX (BMI) OF 32.0 TO 32.9 IN ADULT: Status: ACTIVE | Noted: 2021-09-02

## 2021-09-02 PROBLEM — E66.09 CLASS 1 OBESITY DUE TO EXCESS CALORIES WITH SERIOUS COMORBIDITY AND BODY MASS INDEX (BMI) OF 32.0 TO 32.9 IN ADULT: Status: ACTIVE | Noted: 2021-09-02

## 2021-09-02 NOTE — PROGRESS NOTES
Assessment/Plan:         Problem List Items Addressed This Visit        Digestive    Other ulcerative colitis without complication ([facility] [address] )     Pt to reschedule colonoscpy         Relevant Orders    Ambulatory referral for colonoscopy       Cardiovascular and Mediastinum    Essential hypertension     Start valsartan 80mg po qd         Relevant Medications    valsartan (DIOVAN) 80 mg tablet       Nervous and Auditory    Benign paroxysmal positional vertigo - Primary     Use meclizine prn            Musculoskeletal and Integument    Candida infection of flexural skin     Refill nystatin         Relevant Medications    nystatin (MYCOSTATIN) cream       Other    Dyslipidemia     Pt does not tolerate statin on red yeast rice         Annual physical exam    Relevant Orders    CBC and differential    Lipid panel    Basic metabolic panel    Hemoglobin A1C    Class 1 obesity due to excess calories with serious comorbidity and body mass index (BMI) of 32 0 to 32 9 in adult     Discussion on diet and exercise         Family history of diabetes mellitus (DM)     Check HGa1c         Relevant Orders    Hemoglobin A1C    Cigarette smoker     Pt has chantix at home still do not want to start it advised to stop           Other Visit Diagnoses     Need for hepatitis C screening test        Relevant Orders    Hepatitis C Antibody ([facility])            Subjective:  Pt here for annual physical due for labs and mammo but will schedule with gyn for this n     Patient ID: Lilia Nageotte is a 60 y o  female  HPI    The following portions of the patient's history were reviewed and updated as appropriate:   Past Medical History:  She has a past medical history of Colon polyp, GERD (gastroesophageal reflux disease), HL (hearing loss), Joint pain, Tinnitus, Ulcerative colitis ([facility] [address] ), and Vertigo  ,  _______________________________________________________________________  Medical Problems:  does not have any pertinent problems on file ,  _______________________________________________________________________  Past Surgical History:   has a past surgical history that includes  section (N/A, 594,4426); FL injection left hip (non arthrogram) (12/10/2018); Colonoscopy (2017); Mammo (historical) (2020); FL injection left hip (non arthrogram) (2020); and Colonoscopy  ,  _______________________________________________________________________  Family History:  family history includes Breast cancer in her sister; Colon cancer in her father; Diabetes in her mother; Heart disease in her father and mother; Thyroid disease in her brother ,  _______________________________________________________________________  Social History:   reports that she has been smoking cigarettes  She started smoking about 41 years ago  She has a 15 00 pack-year smoking history  She has never used smokeless tobacco  She reports current alcohol use  She reports that she does not use drugs  ,  _______________________________________________________________________  Allergies:  has No Known Allergies     _______________________________________________________________________  Current Outpatient Medications   Medication Sig Dispense Refill    Cholecalciferol (Vitamin D) 125 MCG (5000 UT) CAPS Take by mouth      co-enzyme Q-10 30 MG capsule Take 30 mg by mouth 3 (three) times a day      Red Yeast Rice Extract (RED YEAST RICE PO) Take by mouth      Na Sulfate-K Sulfate-Mg Sulf (Suprep Bowel Prep Kit) 17 5-3 13-1 6 GM/177ML SOLN Take 177 mL by mouth see administration instructions Take as directed day prior to procedure (Patient not taking: Reported on 2021) 1 Bottle 0    nystatin (MYCOSTATIN) cream Apply topically 2 (two) times a day 60 g 1    valsartan (DIOVAN) 80 mg tablet Take 1 tablet (80 mg total) by mouth daily 30 tablet 6     No current facility-administered medications for this visit  _______________________________________________________________________  Review of Systems   Constitutional: Negative for appetite change, chills, fatigue and fever  Respiratory: Negative for cough, chest tightness and shortness of breath  Cardiovascular: Negative for chest pain, palpitations and leg swelling  Gastrointestinal: Negative for abdominal pain, constipation, diarrhea, nausea and vomiting  Genitourinary: Negative for difficulty urinating and frequency  Musculoskeletal: Negative for arthralgias, back pain and neck pain  Skin: Positive for rash (on off under breasts)  Neurological: Negative for dizziness, weakness, light-headedness, numbness and headaches  Hematological: Does not bruise/bleed easily  Psychiatric/Behavioral: Negative for dysphoric mood and sleep disturbance  The patient is not nervous/anxious  Objective:  Vitals:    09/03/21 1309   BP: 156/100   Pulse: 80   Temp: 97 7 °F (36 5 °C)   SpO2: 98%   Weight: 80 3 kg (177 lb)   Height: 5' 2" (1 575 m)     Body mass index is 32 37 kg/m²  Physical Exam  Vitals reviewed  Constitutional:       General: She is not in acute distress  Appearance: Normal appearance  She is well-developed  She is obese  HENT:      Head: Normocephalic  Right Ear: Tympanic membrane, ear canal and external ear normal       Left Ear: Tympanic membrane, ear canal and external ear normal       Nose: Nose normal       Mouth/Throat:      Pharynx: No oropharyngeal exudate  Eyes:      General: Lids are normal       Extraocular Movements: Extraocular movements intact  Conjunctiva/sclera: Conjunctivae normal       Pupils: Pupils are equal, round, and reactive to light  Neck:      Thyroid: No thyromegaly  Vascular: No carotid bruit  Cardiovascular:      Rate and Rhythm: Normal rate and regular rhythm  Pulses: Normal pulses  Heart sounds: Normal heart sounds  No murmur heard  No friction rub     Pulmonary: Effort: Pulmonary effort is normal  No respiratory distress  Breath sounds: Normal breath sounds  No stridor  No wheezing or rales  Chest:      Breasts: Breasts are symmetrical      Abdominal:      General: Bowel sounds are normal  There is no distension  Palpations: Abdomen is soft  There is no mass  Tenderness: There is no abdominal tenderness  There is no guarding  Hernia: No hernia is present  Musculoskeletal:         General: Normal range of motion  Cervical back: Full passive range of motion without pain, normal range of motion and neck supple  Lymphadenopathy:      Cervical: No cervical adenopathy  Skin:     General: Skin is warm and dry  Findings: No rash (mild redness under breasts)  Comments: Nl appearing moles   Neurological:      General: No focal deficit present  Mental Status: She is alert and oriented to person, place, and time  Mental status is at baseline  Cranial Nerves: No cranial nerve deficit  Sensory: No sensory deficit  Motor: No abnormal muscle tone  Coordination: Coordination normal       Gait: Gait normal       Deep Tendon Reflexes: Reflexes normal  Babinski sign absent on the right side  Psychiatric:         Mood and Affect: Mood normal          Speech: Speech normal          Behavior: Behavior normal          Thought Content: Thought content normal          Judgment: Judgment normal        BMI Counseling: Body mass index is 32 37 kg/m²  The BMI is above normal  Nutrition recommendations include 3-5 servings of fruits/vegetables daily, reducing fast food intake, consuming healthier snacks, decreasing soda and/or juice intake, moderation in carbohydrate intake and increasing intake of lean protein  Exercise recommendations include exercising 3-5 times per week and strength training exercises

## 2021-09-03 ENCOUNTER — OFFICE VISIT (OUTPATIENT)
Dept: FAMILY MEDICINE CLINIC | Facility: CLINIC | Age: 56
End: 2021-09-03
Payer: COMMERCIAL

## 2021-09-03 ENCOUNTER — TELEPHONE (OUTPATIENT)
Dept: FAMILY MEDICINE CLINIC | Facility: CLINIC | Age: 56
End: 2021-09-03

## 2021-09-03 VITALS
OXYGEN SATURATION: 98 % | DIASTOLIC BLOOD PRESSURE: 100 MMHG | WEIGHT: 177 LBS | HEART RATE: 80 BPM | TEMPERATURE: 97.7 F | HEIGHT: 62 IN | SYSTOLIC BLOOD PRESSURE: 156 MMHG | BODY MASS INDEX: 32.57 KG/M2

## 2021-09-03 DIAGNOSIS — E78.5 DYSLIPIDEMIA: ICD-10-CM

## 2021-09-03 DIAGNOSIS — E66.09 CLASS 1 OBESITY DUE TO EXCESS CALORIES WITH SERIOUS COMORBIDITY AND BODY MASS INDEX (BMI) OF 32.0 TO 32.9 IN ADULT: ICD-10-CM

## 2021-09-03 DIAGNOSIS — F17.210 CIGARETTE SMOKER: ICD-10-CM

## 2021-09-03 DIAGNOSIS — K51.80 OTHER ULCERATIVE COLITIS WITHOUT COMPLICATION (HCC): ICD-10-CM

## 2021-09-03 DIAGNOSIS — Z00.00 ANNUAL PHYSICAL EXAM: ICD-10-CM

## 2021-09-03 DIAGNOSIS — I10 ESSENTIAL HYPERTENSION: ICD-10-CM

## 2021-09-03 DIAGNOSIS — B37.2 CANDIDA INFECTION OF FLEXURAL SKIN: ICD-10-CM

## 2021-09-03 DIAGNOSIS — Z11.59 NEED FOR HEPATITIS C SCREENING TEST: ICD-10-CM

## 2021-09-03 DIAGNOSIS — Z83.3 FAMILY HISTORY OF DIABETES MELLITUS (DM): ICD-10-CM

## 2021-09-03 DIAGNOSIS — H81.12 BENIGN PAROXYSMAL POSITIONAL VERTIGO OF LEFT EAR: Primary | ICD-10-CM

## 2021-09-03 PROCEDURE — 3008F BODY MASS INDEX DOCD: CPT | Performed by: FAMILY MEDICINE

## 2021-09-03 PROCEDURE — 3725F SCREEN DEPRESSION PERFORMED: CPT | Performed by: FAMILY MEDICINE

## 2021-09-03 PROCEDURE — 99396 PREV VISIT EST AGE 40-64: CPT | Performed by: FAMILY MEDICINE

## 2021-09-03 PROCEDURE — 4004F PT TOBACCO SCREEN RCVD TLK: CPT | Performed by: FAMILY MEDICINE

## 2021-09-03 RX ORDER — VALSARTAN 80 MG/1
80 TABLET ORAL DAILY
Qty: 30 TABLET | Refills: 6 | Status: SHIPPED | OUTPATIENT
Start: 2021-09-03

## 2021-09-03 RX ORDER — NYSTATIN 100000 U/G
CREAM TOPICAL 2 TIMES DAILY
Qty: 60 G | Refills: 1 | Status: SHIPPED | OUTPATIENT
Start: 2021-09-03

## 2021-09-03 RX ORDER — VALSARTAN 80 MG/1
80 TABLET ORAL DAILY
Qty: 30 TABLET | Refills: 6 | Status: SHIPPED | OUTPATIENT
Start: 2021-09-03 | End: 2021-09-03 | Stop reason: SDUPTHER

## 2021-09-03 RX ORDER — NYSTATIN 100000 U/G
CREAM TOPICAL 2 TIMES DAILY
Qty: 60 G | Refills: 1 | Status: SHIPPED | OUTPATIENT
Start: 2021-09-03 | End: 2021-09-03 | Stop reason: SDUPTHER

## 2021-09-03 NOTE — TELEPHONE ENCOUNTER
Pt called asking her her prescriptions from today can be re-sent to [facility]  It turns out that [facility] does not take her insurance  She is okay waiting until [facility] opens back up to  her scripts

## 2021-09-03 NOTE — TELEPHONE ENCOUNTER
Needs prescription sent to     [address] [place] [address] is closed       Nystatin 100,000 units      Valsartan 80mg

## 2021-09-17 ENCOUNTER — OFFICE VISIT (OUTPATIENT)
Dept: FAMILY MEDICINE CLINIC | Facility: CLINIC | Age: 56
End: 2021-09-17
Payer: COMMERCIAL

## 2021-09-17 VITALS
BODY MASS INDEX: 32.39 KG/M2 | OXYGEN SATURATION: 98 % | HEART RATE: 76 BPM | WEIGHT: 176 LBS | SYSTOLIC BLOOD PRESSURE: 100 MMHG | DIASTOLIC BLOOD PRESSURE: 62 MMHG | TEMPERATURE: 97.3 F | RESPIRATION RATE: 16 BRPM | HEIGHT: 62 IN

## 2021-09-17 DIAGNOSIS — I10 ESSENTIAL HYPERTENSION: Primary | ICD-10-CM

## 2021-09-17 DIAGNOSIS — Z23 IMMUNIZATION DUE: ICD-10-CM

## 2021-09-17 PROCEDURE — 90682 RIV4 VACC RECOMBINANT DNA IM: CPT | Performed by: FAMILY MEDICINE

## 2021-09-17 PROCEDURE — 4004F PT TOBACCO SCREEN RCVD TLK: CPT | Performed by: FAMILY MEDICINE

## 2021-09-17 PROCEDURE — 99213 OFFICE O/P EST LOW 20 MIN: CPT | Performed by: FAMILY MEDICINE

## 2021-09-17 PROCEDURE — 3008F BODY MASS INDEX DOCD: CPT | Performed by: FAMILY MEDICINE

## 2021-09-17 PROCEDURE — 90471 IMMUNIZATION ADMIN: CPT | Performed by: FAMILY MEDICINE

## 2021-09-17 NOTE — PROGRESS NOTES
Assessment/Plan:         Problem List Items Addressed This Visit        Cardiovascular and Mediastinum    Essential hypertension - Primary     Will cut valsartan in  to continue with 40mg  Po qd and again journal readings can call with readings next week keep systolic under 482/ diastolic under 85 follow up 4 months visits                 Subjective: pt here for revaluation of HTN now on meds has been getting low readings 96/58 non one occasion     Patient ID: Ed Felton is a 60 y o  female  HPI    The following portions of the patient's history were reviewed and updated as appropriate:   Past Medical History:  She has a past medical history of Colon polyp, GERD (gastroesophageal reflux disease), HL (hearing loss), Joint pain, Tinnitus, Ulcerative colitis ([address] ), and Vertigo  ,  _______________________________________________________________________  Medical Problems:  does not have any pertinent problems on file ,  _______________________________________________________________________  Past Surgical History:   has a past surgical history that includes  section (N/A, 292,4627); FL injection left hip (non arthrogram) (12/10/2018); Colonoscopy (2017); Mammo (historical) (2020); FL injection left hip (non arthrogram) (2020); and Colonoscopy  ,  _______________________________________________________________________  Family History:  family history includes Breast cancer in her sister; Colon cancer in her father; Diabetes in her mother; Heart disease in her father and mother; Thyroid disease in her brother ,  _______________________________________________________________________  Social History:   reports that she has been smoking cigarettes  She started smoking about 41 years ago  She has a 15 00 pack-year smoking history  She has never used smokeless tobacco  She reports current alcohol use   She reports that she does not use drugs ,  _______________________________________________________________________  Allergies:  has No Known Allergies     _______________________________________________________________________  Current Outpatient Medications   Medication Sig Dispense Refill    Cholecalciferol (Vitamin D) 125 MCG (5000 UT) CAPS Take by mouth      co-enzyme Q-10 30 MG capsule Take 30 mg by mouth 3 (three) times a day      nystatin (MYCOSTATIN) cream Apply topically 2 (two) times a day (Patient taking differently: Apply topically as needed ) 60 g 1    Red Yeast Rice Extract (RED YEAST RICE PO) Take by mouth      valsartan (DIOVAN) 80 mg tablet Take 1 tablet (80 mg total) by mouth daily 30 tablet 6    Na Sulfate-K Sulfate-Mg Sulf (Suprep Bowel Prep Kit) 17 5-3 13-1 6 GM/177ML SOLN Take 177 mL by mouth see administration instructions Take as directed day prior to procedure (Patient not taking: Reported on 4/16/2021) 1 Bottle 0     No current facility-administered medications for this visit      _______________________________________________________________________  Review of Systems   Respiratory: Negative for cough, chest tightness and shortness of breath  Cardiovascular: Negative for chest pain, palpitations and leg swelling  Neurological: Positive for light-headedness (with low bp  readings)  Negative for dizziness, weakness and headaches  Psychiatric/Behavioral: Negative for dysphoric mood  The patient is not nervous/anxious  Objective:  Vitals:    09/17/21 1524 09/17/21 1550   BP: 120/72 100/62   BP Location: Left arm    Patient Position: Sitting    Cuff Size: Adult    Pulse: 76    Resp: 16    Temp: (!) 97 3 °F (36 3 °C)    TempSrc: Temporal    SpO2: 98%    Weight: 79 8 kg (176 lb)    Height: 5' 2" (1 575 m)      Body mass index is 32 19 kg/m²  Physical Exam  Constitutional:       General: She is not in acute distress  Appearance: Normal appearance  She is well-developed  She is obese   She is not ill-appearing  Eyes:      Extraocular Movements: Extraocular movements intact  Pupils: Pupils are equal, round, and reactive to light  Cardiovascular:      Rate and Rhythm: Normal rate and regular rhythm  Pulses: Normal pulses  Heart sounds: Normal heart sounds  No murmur heard  Pulmonary:      Effort: Pulmonary effort is normal       Breath sounds: Normal breath sounds  Musculoskeletal:      Right lower leg: No edema  Left lower leg: No edema  Neurological:      General: No focal deficit present  Mental Status: She is alert and oriented to person, place, and time  Mental status is at baseline  Psychiatric:         Mood and Affect: Mood normal          Behavior: Behavior normal          Thought Content:  Thought content normal

## 2021-09-17 NOTE — ASSESSMENT & PLAN NOTE
Will cut valsartan in 1/2 to continue with 40mg  Po qd and again journal readings can call with readings next week keep systolic under 766/ diastolic under 85 follow up 4 months visits

## 2021-09-25 LAB
BASOPHILS # BLD AUTO: 58 CELLS/UL (ref 0–200)
BASOPHILS NFR BLD AUTO: 1 %
BUN SERPL-MCNC: 13 MG/DL (ref 7–25)
BUN/CREAT SERPL: ABNORMAL (CALC) (ref 6–22)
CALCIUM SERPL-MCNC: 9.3 MG/DL (ref 8.6–10.4)
CHLORIDE SERPL-SCNC: 103 MMOL/L (ref 98–110)
CHOLEST SERPL-MCNC: 184 MG/DL
CHOLEST/HDLC SERPL: 4.3 (CALC)
CO2 SERPL-SCNC: 25 MMOL/L (ref 20–32)
CREAT SERPL-MCNC: 0.9 MG/DL (ref 0.5–1.05)
EOSINOPHIL # BLD AUTO: 81 CELLS/UL (ref 15–500)
EOSINOPHIL NFR BLD AUTO: 1.4 %
ERYTHROCYTE [DISTWIDTH] IN BLOOD BY AUTOMATED COUNT: 12 % (ref 11–15)
GLUCOSE SERPL-MCNC: 105 MG/DL (ref 65–99)
HBA1C MFR BLD: 5.4 % OF TOTAL HGB
HCT VFR BLD AUTO: 42.3 % (ref 35–45)
HCV AB S/CO SERPL IA: 0.04
HCV AB SERPL QL IA: NORMAL
HDLC SERPL-MCNC: 43 MG/DL
HGB BLD-MCNC: 14.3 G/DL (ref 11.7–15.5)
LDLC SERPL CALC-MCNC: 112 MG/DL (CALC)
LYMPHOCYTES # BLD AUTO: 1989 CELLS/UL (ref 850–3900)
LYMPHOCYTES NFR BLD AUTO: 34.3 %
MCH RBC QN AUTO: 32.5 PG (ref 27–33)
MCHC RBC AUTO-ENTMCNC: 33.8 G/DL (ref 32–36)
MCV RBC AUTO: 96.1 FL (ref 80–100)
MONOCYTES # BLD AUTO: 400 CELLS/UL (ref 200–950)
MONOCYTES NFR BLD AUTO: 6.9 %
NEUTROPHILS # BLD AUTO: 3271 CELLS/UL (ref 1500–7800)
NEUTROPHILS NFR BLD AUTO: 56.4 %
NONHDLC SERPL-MCNC: 141 MG/DL (CALC)
PLATELET # BLD AUTO: 269 THOUSAND/UL (ref 140–400)
PMV BLD REES-ECKER: 10.7 FL (ref 7.5–12.5)
POTASSIUM SERPL-SCNC: 4.2 MMOL/L (ref 3.5–5.3)
RBC # BLD AUTO: 4.4 MILLION/UL (ref 3.8–5.1)
SL AMB EGFR AFRICAN AMERICAN: 82 ML/MIN/1.73M2
SL AMB EGFR NON AFRICAN AMERICAN: 70 ML/MIN/1.73M2
SODIUM SERPL-SCNC: 137 MMOL/L (ref 135–146)
TRIGL SERPL-MCNC: 173 MG/DL
WBC # BLD AUTO: 5.8 THOUSAND/UL (ref 3.8–10.8)

## 2021-09-29 ENCOUNTER — TELEPHONE (OUTPATIENT)
Dept: FAMILY MEDICINE CLINIC | Facility: CLINIC | Age: 56
End: 2021-09-29

## 2021-09-29 NOTE — TELEPHONE ENCOUNTER
Patient on valsartan now a half a pill and she was told to call and let us know how shes doing on it and she says its good for her- her blood pressure is not too high and not too low    CARROLL

## 2021-10-15 ENCOUNTER — TELEPHONE (OUTPATIENT)
Dept: GASTROENTEROLOGY | Facility: CLINIC | Age: 56
End: 2021-10-15

## 2021-11-29 ENCOUNTER — APPOINTMENT (EMERGENCY)
Dept: RADIOLOGY | Facility: HOSPITAL | Age: 56
End: 2021-11-29
Payer: COMMERCIAL

## 2021-11-29 ENCOUNTER — HOSPITAL ENCOUNTER (EMERGENCY)
Facility: HOSPITAL | Age: 56
Discharge: HOME | End: 2021-11-29
Attending: EMERGENCY MEDICINE | Admitting: EMERGENCY MEDICINE
Payer: COMMERCIAL

## 2021-11-29 ENCOUNTER — APPOINTMENT (EMERGENCY)
Dept: CT IMAGING | Facility: HOSPITAL | Age: 56
End: 2021-11-29
Payer: COMMERCIAL

## 2021-11-29 VITALS
TEMPERATURE: 98.3 F | HEIGHT: 62 IN | HEART RATE: 73 BPM | DIASTOLIC BLOOD PRESSURE: 60 MMHG | RESPIRATION RATE: 18 BRPM | OXYGEN SATURATION: 96 % | WEIGHT: 180 LBS | SYSTOLIC BLOOD PRESSURE: 136 MMHG | BODY MASS INDEX: 33.13 KG/M2

## 2021-11-29 DIAGNOSIS — M25.512 ACUTE PAIN OF LEFT SHOULDER: Primary | ICD-10-CM

## 2021-11-29 DIAGNOSIS — M62.838 TRAPEZIUS MUSCLE SPASM: ICD-10-CM

## 2021-11-29 LAB
2HR DELTA HS TROPONIN: 0 NG/L
ANION GAP SERPL CALCULATED.3IONS-SCNC: 7 MMOL/L (ref 4–13)
ATRIAL RATE: 69 BPM
ATRIAL RATE: 73 BPM
BASOPHILS # BLD AUTO: 0.06 THOUSANDS/ΜL (ref 0–0.1)
BASOPHILS NFR BLD AUTO: 1 % (ref 0–1)
BUN SERPL-MCNC: 11 MG/DL (ref 5–25)
CALCIUM SERPL-MCNC: 9.2 MG/DL (ref 8.3–10.1)
CARDIAC TROPONIN I PNL SERPL HS: 2 NG/L
CARDIAC TROPONIN I PNL SERPL HS: 2 NG/L
CHLORIDE SERPL-SCNC: 104 MMOL/L (ref 100–108)
CO2 SERPL-SCNC: 28 MMOL/L (ref 21–32)
CREAT SERPL-MCNC: 0.99 MG/DL (ref 0.6–1.3)
EOSINOPHIL # BLD AUTO: 0.08 THOUSAND/ΜL (ref 0–0.61)
EOSINOPHIL NFR BLD AUTO: 1 % (ref 0–6)
ERYTHROCYTE [DISTWIDTH] IN BLOOD BY AUTOMATED COUNT: 12.4 % (ref 11.6–15.1)
GFR SERPL CREATININE-BSD FRML MDRD: 63 ML/MIN/1.73SQ M
GLUCOSE SERPL-MCNC: 101 MG/DL (ref 65–140)
HCT VFR BLD AUTO: 43.8 % (ref 34.8–46.1)
HGB BLD-MCNC: 14 G/DL (ref 11.5–15.4)
IMM GRANULOCYTES # BLD AUTO: 0.01 THOUSAND/UL (ref 0–0.2)
IMM GRANULOCYTES NFR BLD AUTO: 0 % (ref 0–2)
LYMPHOCYTES # BLD AUTO: 1.6 THOUSANDS/ΜL (ref 0.6–4.47)
LYMPHOCYTES NFR BLD AUTO: 24 % (ref 14–44)
MCH RBC QN AUTO: 31.3 PG (ref 26.8–34.3)
MCHC RBC AUTO-ENTMCNC: 32 G/DL (ref 31.4–37.4)
MCV RBC AUTO: 98 FL (ref 82–98)
MONOCYTES # BLD AUTO: 0.46 THOUSAND/ΜL (ref 0.17–1.22)
MONOCYTES NFR BLD AUTO: 7 % (ref 4–12)
NEUTROPHILS # BLD AUTO: 4.56 THOUSANDS/ΜL (ref 1.85–7.62)
NEUTS SEG NFR BLD AUTO: 67 % (ref 43–75)
NRBC BLD AUTO-RTO: 0 /100 WBCS
NT-PROBNP SERPL-MCNC: 64 PG/ML
P AXIS: 51 DEGREES
P AXIS: 65 DEGREES
PLATELET # BLD AUTO: 269 THOUSANDS/UL (ref 149–390)
PMV BLD AUTO: 10.3 FL (ref 8.9–12.7)
POTASSIUM SERPL-SCNC: 4.9 MMOL/L (ref 3.5–5.3)
PR INTERVAL: 144 MS
PR INTERVAL: 164 MS
QRS AXIS: 28 DEGREES
QRS AXIS: 32 DEGREES
QRSD INTERVAL: 64 MS
QRSD INTERVAL: 64 MS
QT INTERVAL: 388 MS
QT INTERVAL: 396 MS
QTC INTERVAL: 410 MS
QTC INTERVAL: 428 MS
RBC # BLD AUTO: 4.48 MILLION/UL (ref 3.81–5.12)
SODIUM SERPL-SCNC: 139 MMOL/L (ref 136–145)
T WAVE AXIS: 14 DEGREES
T WAVE AXIS: 37 DEGREES
TSH SERPL DL<=0.05 MIU/L-ACNC: 1.51 UIU/ML (ref 0.36–3.74)
VENTRICULAR RATE: 67 BPM
VENTRICULAR RATE: 70 BPM
WBC # BLD AUTO: 6.77 THOUSAND/UL (ref 4.31–10.16)

## 2021-11-29 PROCEDURE — 70491 CT SOFT TISSUE NECK W/DYE: CPT

## 2021-11-29 PROCEDURE — 83880 ASSAY OF NATRIURETIC PEPTIDE: CPT | Performed by: EMERGENCY MEDICINE

## 2021-11-29 PROCEDURE — 93010 ELECTROCARDIOGRAM REPORT: CPT | Performed by: INTERNAL MEDICINE

## 2021-11-29 PROCEDURE — 99285 EMERGENCY DEPT VISIT HI MDM: CPT | Performed by: EMERGENCY MEDICINE

## 2021-11-29 PROCEDURE — 84484 ASSAY OF TROPONIN QUANT: CPT | Performed by: EMERGENCY MEDICINE

## 2021-11-29 PROCEDURE — 71275 CT ANGIOGRAPHY CHEST: CPT

## 2021-11-29 PROCEDURE — 84443 ASSAY THYROID STIM HORMONE: CPT | Performed by: EMERGENCY MEDICINE

## 2021-11-29 PROCEDURE — 80048 BASIC METABOLIC PNL TOTAL CA: CPT | Performed by: EMERGENCY MEDICINE

## 2021-11-29 PROCEDURE — 93005 ELECTROCARDIOGRAM TRACING: CPT

## 2021-11-29 PROCEDURE — 85025 COMPLETE CBC W/AUTO DIFF WBC: CPT | Performed by: EMERGENCY MEDICINE

## 2021-11-29 PROCEDURE — 99284 EMERGENCY DEPT VISIT MOD MDM: CPT

## 2021-11-29 PROCEDURE — 73030 X-RAY EXAM OF SHOULDER: CPT

## 2021-11-29 PROCEDURE — 36415 COLL VENOUS BLD VENIPUNCTURE: CPT | Performed by: EMERGENCY MEDICINE

## 2021-11-29 RX ORDER — METHOCARBAMOL 500 MG/1
500 TABLET, FILM COATED ORAL 3 TIMES DAILY PRN
Qty: 14 TABLET | Refills: 0 | Status: SHIPPED | OUTPATIENT
Start: 2021-11-29 | End: 2022-01-21 | Stop reason: ALTCHOICE

## 2021-11-29 RX ORDER — NAPROXEN 500 MG/1
500 TABLET ORAL 2 TIMES DAILY WITH MEALS
Qty: 14 TABLET | Refills: 0 | Status: SHIPPED | OUTPATIENT
Start: 2021-11-29 | End: 2022-01-21 | Stop reason: ALTCHOICE

## 2021-11-29 RX ADMIN — IOHEXOL 85 ML: 350 INJECTION, SOLUTION INTRAVENOUS at 11:19

## 2021-12-23 ENCOUNTER — OFFICE VISIT (OUTPATIENT)
Dept: GASTROENTEROLOGY | Facility: CLINIC | Age: 56
End: 2021-12-23
Payer: COMMERCIAL

## 2021-12-23 VITALS
HEART RATE: 83 BPM | DIASTOLIC BLOOD PRESSURE: 63 MMHG | HEIGHT: 62 IN | BODY MASS INDEX: 32.94 KG/M2 | WEIGHT: 179 LBS | SYSTOLIC BLOOD PRESSURE: 111 MMHG

## 2021-12-23 DIAGNOSIS — R10.31 RIGHT LOWER QUADRANT ABDOMINAL PAIN: ICD-10-CM

## 2021-12-23 DIAGNOSIS — Z80.0 FAMILY HISTORY OF COLON CANCER IN FATHER: ICD-10-CM

## 2021-12-23 DIAGNOSIS — K51.311 ULCERATIVE RECTOSIGMOIDITIS WITH RECTAL BLEEDING (HCC): Primary | ICD-10-CM

## 2021-12-23 PROCEDURE — 99204 OFFICE O/P NEW MOD 45 MIN: CPT | Performed by: INTERNAL MEDICINE

## 2021-12-23 PROCEDURE — 4004F PT TOBACCO SCREEN RCVD TLK: CPT | Performed by: INTERNAL MEDICINE

## 2021-12-23 PROCEDURE — 3008F BODY MASS INDEX DOCD: CPT | Performed by: INTERNAL MEDICINE

## 2021-12-23 RX ORDER — MESALAMINE 375 MG/1
750 CAPSULE, EXTENDED RELEASE ORAL 2 TIMES DAILY
Qty: 120 CAPSULE | Refills: 5 | Status: SHIPPED | OUTPATIENT
Start: 2021-12-23 | End: 2022-03-29 | Stop reason: SDUPTHER

## 2021-12-23 NOTE — H&P (VIEW-ONLY)
[facility] - Outpatient Consultation  Melissa Dickens 60 y o  female MRN: [id number]  Encounter: [id number]          ASSESSMENT AND PLAN:      1  Ulcerative rectosigmoiditis with rectal bleeding ([address] )    2  Right lower quadrant abdominal pain    3  Family history of colon cancer in father      Patient has longstanding history of ulcerative colitis involving sigmoid and rectum, last seen by me more than 4 years ago for colonoscopy, off any medications  Recent episode may be a mild flare she is getting better already the pain is resolved bleeding his last, not tenesmus fever chills, no evidence of acute abdomen ileus obstruction  She wants to hold off any medications till her colonoscopy scheduled for next week which she wants to have it done by me  Last colonoscopy 12/04/2017 had some hyperplastic polyps and some inflammation on biopsies  Recent CBC CMP unremarkable  Father had colon cancer at age 61  Complications of ulcerative colitis discussed    ______________________________________________________________________    HPI:   [facility] came in for evaluation of her history of ulcerative colitis  Recently she developed some pain in the lower abdomen worse on the right side, with diarrhea few times a day, with mucus and pinkish material some blood change, 3 to 4 times a day with urgency, appetite has been fair weight stable no travels antibiotics no new medications, did not eat anything unusual no one else around her sick  Appetite has been fair weight stable no chest pains shortness of breath coughing choking spells nocturnal reflux regurgitation bronchitis pneumonias  She saw me about 4 years ago for a colonoscopy, she has not seen me since and has not been on any medications for ulcerative colitis either  Denies any history of CVA Ca CAD psych ENT problems  Diet medications more than 10 systems reviewed        REVIEW OF SYSTEMS:    CONSTITUTIONAL: Denies any fever, chills, rigors, and weight loss  HEENT: No earache or tinnitus  CARDIOVASCULAR: No chest pain or palpitations  RESPIRATORY: Denies any cough, hemoptysis, shortness of breath or dyspnea on exertion  GASTROINTESTINAL: As noted in the History of Present Illness  GENITOURINARY: Denies any hematuria or dysuria  NEUROLOGIC: No dizziness or vertigo  MUSCULOSKELETAL: Denies any joint swellings  SKIN: Denies skin rashes or itching  ENDOCRINE: Denies excessive thirst  Denies intolerance to heat or cold  PSYCHOSOCIAL: Denies depression or anxiety  Denies any recent memory loss         Historical Information   Past Medical History:   Diagnosis Date    Colon polyp     GERD (gastroesophageal reflux disease)     19 under control at this time, no meds    HL (hearing loss)     Joint pain     left hip labral tear    Tinnitus     Ulcerative colitis ([address] )     19 no meds at this time    Vertigo      Past Surgical History:   Procedure Laterality Date     SECTION N/A 513,7215    COLONOSCOPY  2017    COLONOSCOPY      FL INJECTION LEFT HIP (NON ARTHROGRAM)  12/10/2018    FL INJECTION LEFT HIP (NON ARTHROGRAM)  2020    MAMMO (HISTORICAL)  2020     Social History   Social History     Substance and Sexual Activity   Alcohol Use Yes    Comment: occasional     Social History     Substance and Sexual Activity   Drug Use Never     Social History     Tobacco Use   Smoking Status Current Every Day Smoker    Packs/day: 0 50    Years: 30 00    Pack years: 15 00    Types: Cigarettes    Start date: 9/3/1980   Smokeless Tobacco Never Used   Tobacco Comment    started at age 23 per [place]     Family History   Problem Relation Age of Onset    Heart disease Mother     Diabetes Mother     Heart disease Father     Colon cancer Father     Breast cancer Sister     Thyroid disease Brother        Meds/Allergies       Current Outpatient Medications:     Cholecalciferol (Vitamin D) 125 MCG (5000 UT) CAPS   co-enzyme Q-10 30 MG capsule    Red Yeast Rice Extract (RED YEAST RICE PO)    valsartan (DIOVAN) 80 mg tablet    methocarbamol (ROBAXIN) 500 mg tablet    Na Sulfate-K Sulfate-Mg Sulf (Suprep Bowel Prep Kit) 17 5-3 13-1 6 GM/177ML SOLN    naproxen (NAPROSYN) 500 mg tablet    nystatin (MYCOSTATIN) cream    No Known Allergies        Objective     Blood pressure 111/63, pulse 83, height 5' 2" (1 575 m), weight 81 2 kg (179 lb)  Body mass index is 32 74 kg/m²  PHYSICAL EXAM:      General Appearance:   Alert, cooperative, no distress   HEENT:   Normocephalic, atraumatic, anicteric  Neck:  Supple, symmetrical, trachea midline   Lungs:   Clear to auscultation bilaterally; no rales, rhonchi or wheezing; respirations unlabored    Heart[de-identified]   Regular rate and rhythm; no murmur  Abdomen:   Soft, non-tender, non-distended; normal bowel sounds; no masses, no organomegaly    Genitalia:   Deferred    Rectal:   Deferred    Extremities:  No cyanosis, clubbing or edema    Skin:  No jaundice, rashes, or lesions    Lymph nodes:  No palpable cervical lymphadenopathy        Lab Results:   No visits with results within 1 Day(s) from this visit     Latest known visit with results is:   Admission on 11/29/2021, Discharged on 11/29/2021   Component Date Value    WBC 11/29/2021 6 77     RBC 11/29/2021 4 48     Hemoglobin 11/29/2021 14 0     Hematocrit 11/29/2021 43 8     MCV 11/29/2021 98     MCH 11/29/2021 31 3     MCHC 11/29/2021 32 0     RDW 11/29/2021 12 4     MPV 11/29/2021 10 3     Platelets 42/76/8257 269     nRBC 11/29/2021 0     Neutrophils Relative 11/29/2021 67     Immat GRANS % 11/29/2021 0     Lymphocytes Relative 11/29/2021 24     Monocytes Relative 11/29/2021 7     Eosinophils Relative 11/29/2021 1     Basophils Relative 11/29/2021 1     Neutrophils Absolute 11/29/2021 4 56     Immature Grans Absolute 11/29/2021 0 01     Lymphocytes Absolute 11/29/2021 1 60     Monocytes Absolute 11/29/2021 0 46     Eosinophils Absolute 11/29/2021 0 08     Basophils Absolute 11/29/2021 0 06     Sodium 11/29/2021 139     Potassium 11/29/2021 4 9     Chloride 11/29/2021 104     CO2 11/29/2021 28     ANION GAP 11/29/2021 7     BUN 11/29/2021 11     Creatinine 11/29/2021 0 99     Glucose 11/29/2021 101     Calcium 11/29/2021 9 2     eGFR 11/29/2021 63     NT-proBNP 11/29/2021 64     hs TnI 0hr 11/29/2021 2     TSH 3RD GENERATON 11/29/2021 1 508     hs TnI 2hr 11/29/2021 2     Delta 2hr hsTnI 11/29/2021 0     Ventricular Rate 11/29/2021 67     Atrial Rate 11/29/2021 69     NV Interval 11/29/2021 144     QRSD Interval 11/29/2021 64     QT Interval 11/29/2021 388     QTC Interval 11/29/2021 410     P Axis 11/29/2021 51     QRS Axis 11/29/2021 28     T Wave Axis 11/29/2021 14     Ventricular Rate 11/29/2021 70     Atrial Rate 11/29/2021 73     NV Interval 11/29/2021 164     QRSD Interval 11/29/2021 64     QT Interval 11/29/2021 396     QTC Interval 11/29/2021 428     P Axis 11/29/2021 65     QRS Axis 11/29/2021 32     T Wave Axis 11/29/2021 37          Radiology Results:   XR shoulder 2+ views LEFT    Result Date: 11/29/2021  Narrative: LEFT SHOULDER INDICATION:   pain  COMPARISON:  None VIEWS:  XR SHOULDER 2+ VW LEFT FINDINGS: There is no acute fracture or dislocation  Mild degenerative AC joint  No lytic or blastic osseous lesion  Soft tissues are unremarkable  Impression: No acute osseous abnormality  Workstation performed: [id number]     CT soft tissue neck with contrast    Result Date: 11/29/2021  Narrative: CT NECK WITH CONTRAST INDICATION:   left sided neck/chest pain, swelling  COMPARISON:  None  TECHNIQUE:  Axial, sagittal, and coronal 2D reformatted images were created from the axial source data and submitted for interpretation  Radiation dose length product (DLP) for this visit:  802 mGy-cm     This examination, like all CT scans performed in the [facility], was performed utilizing techniques to minimize radiation dose exposure, including the use of iterative reconstruction and automated exposure control  IV Contrast:  85 mL of iohexol (OMNIPAQUE) IMAGE QUALITY:  Diagnostic  FINDINGS: VISUALIZED BRAIN PARENCHYMA:  No acute intracranial pathology of the visualized brain parenchyma  VISUALIZED ORBITS AND PARANASAL SINUSES:  Normal  NASAL CAVITY AND NASOPHARYNX:  Normal  SUPRAHYOID NECK:  Normal oral cavity, tongue base, tonsillar fossa and epiglottis  Small calcifications within the palatine tonsils, suggesting sequela of previous infection  INFRAHYOID NECK:  Aryepiglottic folds and piriform sinuses are normal   Normal glottis and subglottic airway  THYROID GLAND:  Unremarkable  PAROTID AND SUBMANDIBULAR GLANDS:  Normal  LYMPH NODES:  No pathologic or enlarged adenopathy  VASCULAR STRUCTURES:  Normal enhancement of the cervical vasculature  THORACIC INLET:  Lung apices and upper mediastinum are unremarkable  BONY STRUCTURES: No acute fracture or destructive osseous lesion  Impression: Normal CT of the neck  No pathologic adenopathy or mass identified to account for left neck /upper chest swelling  Workstation performed: [id number]     CTA ED chest PE study    Result Date: 11/29/2021  Narrative: CTA - CHEST WITH IV CONTRAST - PULMONARY ANGIOGRAM INDICATION:  chest pain  COMPARISON:  Chest radiographs 5/8/2019 TECHNIQUE:  CTA examination of the chest was performed using angiographic technique according to a protocol specifically tailored to evaluate for pulmonary embolism  Axial, sagittal, and coronal 2D reformatted images were created from the source data and submitted for interpretation  In addition, coronal 3D MIP postprocessing was performed on the acquisition scanner  Radiation dose length product (DLP) for this visit:  570 mGy-cm     This examination, like all CT scans performed in the [place], was performed utilizing techniques to minimize radiation dose exposure, including the use of iterative reconstruction and automated exposure control  IV Contrast:  85 mL of iohexol (OMNIPAQUE)  FINDINGS: PULMONARY ARTERIAL TREE:  No pulmonary embolus is seen  LUNGS:  Lungs are clear  Central airways are patent  Scattered calcified granulomata  PLEURA:  Unremarkable  HEART/GREAT VESSELS:  Unremarkable for patient's age  MEDIASTINUM AND ALBERTO:  Unremarkable  CHEST WALL AND LOWER NECK: Subcentimeter hypodense nodule in the right thyroid lobe  According to guidelines published in the February 2015 white paper on incidental thyroid nodules in the Journal of the [employer] of Radiology [facility]), no further evaluation is recommended  There is a prior thyroid ultrasound 9/29/2017  VISUALIZED STRUCTURES IN THE UPPER ABDOMEN:  Unremarkable  OSSEOUS STRUCTURES:  No acute fracture or destructive osseous lesion  Impression: No evidence of pulmonary embolism    No acute findings in the chest  Workstation performed: [id number]

## 2021-12-29 ENCOUNTER — ANESTHESIA (OUTPATIENT)
Dept: GASTROENTEROLOGY | Facility: AMBULATORY SURGERY CENTER | Age: 56
End: 2021-12-29

## 2021-12-29 ENCOUNTER — ANESTHESIA EVENT (OUTPATIENT)
Dept: GASTROENTEROLOGY | Facility: AMBULATORY SURGERY CENTER | Age: 56
End: 2021-12-29

## 2021-12-29 ENCOUNTER — HOSPITAL ENCOUNTER (OUTPATIENT)
Dept: GASTROENTEROLOGY | Facility: AMBULATORY SURGERY CENTER | Age: 56
Discharge: HOME | End: 2021-12-29
Payer: COMMERCIAL

## 2021-12-29 VITALS
WEIGHT: 178 LBS | HEART RATE: 78 BPM | HEIGHT: 62 IN | DIASTOLIC BLOOD PRESSURE: 65 MMHG | OXYGEN SATURATION: 98 % | BODY MASS INDEX: 32.76 KG/M2 | SYSTOLIC BLOOD PRESSURE: 98 MMHG | RESPIRATION RATE: 18 BRPM | TEMPERATURE: 97.9 F

## 2021-12-29 DIAGNOSIS — Z86.010 HISTORY OF COLON POLYPS: ICD-10-CM

## 2021-12-29 DIAGNOSIS — Z80.0 FAMILY HISTORY OF COLON CANCER IN FATHER: ICD-10-CM

## 2021-12-29 PROCEDURE — 88305 TISSUE EXAM BY PATHOLOGIST: CPT | Performed by: PATHOLOGY

## 2021-12-29 PROCEDURE — 00811 ANES LWR INTST NDSC NOS: CPT | Performed by: NURSE ANESTHETIST, CERTIFIED REGISTERED

## 2021-12-29 PROCEDURE — 45380 COLONOSCOPY AND BIOPSY: CPT | Performed by: INTERNAL MEDICINE

## 2021-12-29 PROCEDURE — 45385 COLONOSCOPY W/LESION REMOVAL: CPT | Performed by: INTERNAL MEDICINE

## 2021-12-29 RX ORDER — ESTRADIOL 0.1 MG/G
CREAM VAGINAL
COMMUNITY
Start: 2021-10-08

## 2021-12-29 RX ORDER — SODIUM CHLORIDE 9 MG/ML
INJECTION, SOLUTION INTRAVENOUS CONTINUOUS PRN
Status: DISCONTINUED | OUTPATIENT
Start: 2021-12-29 | End: 2021-12-29

## 2021-12-29 RX ORDER — SODIUM CHLORIDE 9 MG/ML
30 INJECTION, SOLUTION INTRAVENOUS CONTINUOUS
Status: DISCONTINUED | OUTPATIENT
Start: 2021-12-29 | End: 2022-01-02 | Stop reason: HOSPADM

## 2021-12-29 RX ORDER — SODIUM CHLORIDE 9 MG/ML
20 INJECTION, SOLUTION INTRAVENOUS CONTINUOUS
Status: DISCONTINUED | OUTPATIENT
Start: 2021-12-29 | End: 2022-01-02 | Stop reason: HOSPADM

## 2021-12-29 RX ORDER — PROPOFOL 10 MG/ML
INJECTION, EMULSION INTRAVENOUS AS NEEDED
Status: DISCONTINUED | OUTPATIENT
Start: 2021-12-29 | End: 2021-12-29

## 2021-12-29 RX ADMIN — PROPOFOL 50 MG: 10 INJECTION, EMULSION INTRAVENOUS at 10:18

## 2021-12-29 RX ADMIN — PROPOFOL 50 MG: 10 INJECTION, EMULSION INTRAVENOUS at 10:21

## 2021-12-29 RX ADMIN — PROPOFOL 50 MG: 10 INJECTION, EMULSION INTRAVENOUS at 10:24

## 2021-12-29 RX ADMIN — PROPOFOL 50 MG: 10 INJECTION, EMULSION INTRAVENOUS at 10:27

## 2021-12-29 RX ADMIN — PROPOFOL 50 MG: 10 INJECTION, EMULSION INTRAVENOUS at 10:17

## 2021-12-29 RX ADMIN — SODIUM CHLORIDE: 9 INJECTION, SOLUTION INTRAVENOUS at 10:11

## 2021-12-29 NOTE — DISCHARGE INSTRUCTIONS
Ulcerative Colitis   WHAT YOU NEED TO KNOW:   What is ulcerative colitis? Ulcerative colitis is a chronic disease of the colon (large intestine)  Inflammation and ulcers form on the inner lining of your colon  Ulcerative colitis is a type of inflammatory bowel disease  It usually starts between the ages of 13and 27years old  What are the signs and symptoms of ulcerative colitis? · Diarrhea that may have mucus or blood    · Bleeding from your rectum    · Urgent bowel movements    · Abdominal tenderness and bloating    · Fever, poor appetite, weight loss, fatigue    How is ulcerative colitis diagnosed? · A rectal exam  may show blood  Your healthcare provider will put a gloved finger inside your rectum  He or she will feel for inflammation or blockage  · A CT or MRI  may show a blockage, an abscess, inflammation, or abnormal connection  You may be given contrast liquid to help your colon show up better in the pictures  Tell the healthcare provider if you have ever had an allergic reaction to contrast liquid  Do not enter the MRI room with anything metal  Metal can cause serious injury  Tell the healthcare provider if you have any metal in or on your body  · Endoscopy  is a procedure used to find the cause of your ulcerative colitis  An endoscope is a bendable tube with a light and camera on the end  A small sample of tissue and bowel movement may be removed  These are sent to a lab for testing  How is ulcerative colitis treated? · Medicines  may be given to help decrease inflammation or control your immune system  You may need to take more than 1 medicine to treat your ulcerative colitis  · Surgery  may be needed to remove part or all of your colon  Ask about the different kinds of surgery that can be done to help your symptoms  How can I manage my ulcerative colitis? · Do not take NSAID medicines , including aspirin and ibuprofen  NSAIDs can cause flare-ups      · Eat a variety of healthy foods  to keep your colon healthy  Healthy foods include fruit, vegetables, whole-grain breads, low-fat dairy products, beans, lean meat, and fish  Do not eat foods that make your symptoms worse  Your healthcare provider may give you vitamins or minerals to improve your nutrition if you have severe ulcerative colitis  · Drink liquids as directed  Ask how much liquid to drink each day and which liquids are best for you  For most people, water, juice, and milk are good choices  Do not drink alcohol  This can make your symptoms worse  · Exercise regularly  Ask about the best exercise plan for you  Any activity is better than none  Even 10 minutes a few times a day would help prevent constipation and help keep your colon healthy  · Manage stress  Stress may slow healing and cause illness  Learn new ways to relax, such as deep breathing  Call, or have someone call, your local emergency number (911 in the [address] [facility]) if:   · You have sudden trouble breathing  · You have a fast heart rate, fast breathing, or are too dizzy to stand up  When should I seek immediate care? · You have severe abdominal pain  · Your vomit has blood in it or looks like coffee grounds  · You see blood in your bowel movement  When should I call my doctor? · You have a fever, chills, a cough, or feel weak and achy  · You have abdominal pain that does not go away or gets worse after you take medicine  · Your abdomen is swollen  · You lose weight without trying  · You have questions or concerns about your condition or care  CARE AGREEMENT:   You have the right to help plan your care  Learn about your health condition and how it may be treated  Discuss treatment options with your healthcare providers to decide what care you want to receive  You always have the right to refuse treatment  The above information is an  only   It is not intended as medical advice for individual conditions or treatments  Talk to your doctor, nurse or pharmacist before following any medical regimen to see if it is safe and effective for you  © Copyright [employer] 2021 Information is for End User's use only and may not be sold, redistributed or otherwise used for commercial purposes  All illustrations and images included in CareNotes® are the copyrighted property of [employer]  or Kerwin Sharma   Colorectal Polyps   WHAT YOU NEED TO KNOW:   What are colorectal polyps? Colorectal polyps are small growths of tissue in the lining of the colon and rectum  Most polyps are usually benign (not cancer)  Certain types of polyps, called adenomatous polyps, may turn into cancer  What increases my risk for colorectal polyps? The exact cause of colorectal polyps is unknown  The following may increase your risk:  · Older age    · Foods high in fat and low in fiber    · Family history of polyps    · Intestinal diseases, such as Crohn disease or ulcerative colitis    · Smoking cigarettes or drinking alcohol    · Lack of physical activity, such as exercise    · Obesity    What are the signs and symptoms of colorectal polyps? · Blood in your bowel movement or bleeding from the rectum    · Change in bowel movement habits, such as diarrhea or constipation    · Abdominal pain    What do I need to know about colorectal polyp screening and diagnosis? Screening means you are checked for polyps that may be cancer, even if you do not have signs or symptoms  Screening is recommended starting at age 48 and continuing to age 76 if you are at average risk  Your healthcare provider may suggest screening starting at age 39  Screening may start before you are 45 or continue after you are 75 if your risk is high  Your provider will tell you how often to get screened  Timing depends on the type of screening and if polyps are found  Timing also depends on your age and if you are at increased risk for cancer   Screening may be recommended every 1, 2, 5, or 10 years  Your healthcare provider will need to test polyps to find out if they are cancer  Any of the following may be used to find polyps:  · A digital rectal exam  means your provider will use a finger to check for polyps  · A barium enema  is an x-ray of the colon  A tube is put into your anus, and a liquid called barium is put through the tube  Barium is used so that healthcare providers can see your colon better on the x-ray film  · A virtual colonoscopy  is a CT scan that takes pictures of the inside of your colon and rectum  A small, flexible tube is put into your rectum and air or carbon dioxide (gas) is used to expand your colon  This lets healthcare providers clearly see your colon and any polyps on a monitor  · Colonoscopy or sigmoidoscopy  are procedures to help your provider see the inside of your colon  He or she will use a flexible tube with a small light and camera on the end  During a sigmoidoscopy, your provider will only look at rectum and lower colon  During a colonoscopy, he or she will look at the full length of your colon  A small amount of tissue may be removed from your colon for tests  How are colorectal polyps treated? Small, benign polyps may not need treatment  Your healthcare provider will check the polyp over time to make sure it is not changing  Polyps that are cancer may be removed with one of the following:  · A polypectomy  is a minimally invasive procedure to remove a polyp during a colonoscopy or sigmoidoscopy  Your healthcare provider may need to remove the polyp with a laparoscope  Laparoscopy is done by inserting a small, flexible scope into incisions made on your abdomen  · Surgery  may be needed to remove large or deep polyps that cannot be removed in a polypectomy  Tissues or lymph nodes near a polyp may also be removed  This helps stop cancer from spreading  What can I do to lower my risk for colorectal polyps?    · Eat a variety of healthy foods  Healthy foods include fruit, vegetables, whole-grain breads, low-fat dairy products, beans, lean meat, and fish  Ask if you need to be on a special diet  · Maintain a healthy weight  Ask your healthcare provider what a healthy weight is for you  Ask him or her to help with a weight loss plan if you are overweight  · Exercise as directed  Begin to exercise slowly and do more as you get stronger  Talk with your healthcare provider before you start an exercise program          · Limit alcohol  Your risk for polyps increases the more you drink  · Do not smoke  Nicotine and other chemicals in cigarettes and cigars increase your risk for polyps  Ask your healthcare provider for information if you currently smoke and need help to quit  E-cigarettes or smokeless tobacco still contain nicotine  Talk to your healthcare provider before you use these products  Where can I find more information? · Yonny Parikh ([facility])  [address], [place] [address]  Phone: [phone]  Web Address: [employer]  [facility] gov    Call your local emergency number (911 in the [address] [facility]) if:   · You have sudden shortness of breath  · You have a fast heart rate, fast breathing, or are too dizzy to stand up  When should I seek immediate care? · You have severe abdominal pain  · You see blood in your bowel movement  When should I call my doctor? · You have a fever  · You have chills, a cough, or feel weak and achy  · You have abdominal pain that does not go away or gets worse after you take medicine  · Your abdomen is swollen  · You are losing weight without trying  · You have questions or concerns about your condition or care  CARE AGREEMENT:   You have the right to help plan your care  Learn about your health condition and how it may be treated   Discuss treatment options with your healthcare providers to decide what care you want to receive  You always have the right to refuse treatment  The above information is an  only  It is not intended as medical advice for individual conditions or treatments  Talk to your doctor, nurse or pharmacist before following any medical regimen to see if it is safe and effective for you  © Copyright [employer] 2021 Information is for End User's use only and may not be sold, redistributed or otherwise used for commercial purposes   All illustrations and images included in CareNotes® are the copyrighted property of A D A M , Inc  or [address] [place] [address]

## 2021-12-29 NOTE — INTERVAL H&P NOTE
H&P reviewed  After examining the patient I find no changes in the patients condition since the H&P had been written      Vitals:    12/29/21 0942   BP: 128/64   Pulse: 90   Resp: 18   Temp: 97 9 °F (36 6 °C)   SpO2: 98%

## 2022-01-11 ENCOUNTER — TELEPHONE (OUTPATIENT)
Dept: GASTROENTEROLOGY | Facility: CLINIC | Age: 57
End: 2022-01-11

## 2022-01-11 NOTE — TELEPHONE ENCOUNTER
Left message for patient to call and schedule f/u ov to colon with Dr Guilford Hammans  Will call her again in 1 wk if she doesn't return call to schedule

## 2022-01-11 NOTE — TELEPHONE ENCOUNTER
----- Message from Fede Chew LPN sent at 0/07/9607 10:09 AM EST -----  Patient aware  Educated  Colon 3 years  Please call patient to schedule f/u appt   Thank you

## 2022-01-18 NOTE — TELEPHONE ENCOUNTER
Left another message for patient to call and schedule f/u ov to colon with Dr Ken Frey  Will call one more time in 2 weeks if she doesn't return call to schedule

## 2022-01-21 ENCOUNTER — OFFICE VISIT (OUTPATIENT)
Dept: FAMILY MEDICINE CLINIC | Facility: CLINIC | Age: 57
End: 2022-01-21
Payer: COMMERCIAL

## 2022-01-21 VITALS
HEART RATE: 84 BPM | SYSTOLIC BLOOD PRESSURE: 120 MMHG | HEIGHT: 62 IN | RESPIRATION RATE: 16 BRPM | BODY MASS INDEX: 32.94 KG/M2 | OXYGEN SATURATION: 99 % | TEMPERATURE: 97 F | DIASTOLIC BLOOD PRESSURE: 70 MMHG | WEIGHT: 179 LBS

## 2022-01-21 DIAGNOSIS — F17.210 CIGARETTE SMOKER: ICD-10-CM

## 2022-01-21 DIAGNOSIS — K51.80 OTHER ULCERATIVE COLITIS WITHOUT COMPLICATION (HCC): ICD-10-CM

## 2022-01-21 DIAGNOSIS — I10 ESSENTIAL HYPERTENSION: Primary | ICD-10-CM

## 2022-01-21 DIAGNOSIS — B37.2 CANDIDA INFECTION OF FLEXURAL SKIN: ICD-10-CM

## 2022-01-21 DIAGNOSIS — H81.12 BENIGN PAROXYSMAL POSITIONAL VERTIGO OF LEFT EAR: ICD-10-CM

## 2022-01-21 DIAGNOSIS — E78.5 DYSLIPIDEMIA: ICD-10-CM

## 2022-01-21 DIAGNOSIS — M24.152 DEGENERATIVE TEAR OF ACETABULAR LABRUM OF LEFT HIP: ICD-10-CM

## 2022-01-21 DIAGNOSIS — E66.09 CLASS 1 OBESITY DUE TO EXCESS CALORIES WITH SERIOUS COMORBIDITY AND BODY MASS INDEX (BMI) OF 32.0 TO 32.9 IN ADULT: ICD-10-CM

## 2022-01-21 DIAGNOSIS — K51.80 OTHER ULCERATIVE COLITIS WITHOUT COMPLICATIONS (HCC): ICD-10-CM

## 2022-01-21 PROBLEM — J01.00 ACUTE NON-RECURRENT MAXILLARY SINUSITIS: Status: RESOLVED | Noted: 2020-10-07 | Resolved: 2022-01-21

## 2022-01-21 PROCEDURE — 3725F SCREEN DEPRESSION PERFORMED: CPT | Performed by: FAMILY MEDICINE

## 2022-01-21 PROCEDURE — 99214 OFFICE O/P EST MOD 30 MIN: CPT | Performed by: FAMILY MEDICINE

## 2022-01-21 PROCEDURE — 4004F PT TOBACCO SCREEN RCVD TLK: CPT | Performed by: FAMILY MEDICINE

## 2022-01-21 PROCEDURE — 3008F BODY MASS INDEX DOCD: CPT | Performed by: FAMILY MEDICINE

## 2022-01-21 RX ORDER — ACETAMINOPHEN AND CODEINE PHOSPHATE 300; 30 MG/1; MG/1
1 TABLET ORAL EVERY 8 HOURS PRN
Qty: 15 TABLET | Refills: 0 | Status: SHIPPED | OUTPATIENT
Start: 2022-01-21 | End: 2022-05-27

## 2022-01-21 NOTE — PROGRESS NOTES
Assessment/Plan:         Problem List Items Addressed This Visit        Digestive    Other ulcerative colitis without complication ([facility] )     Had recent colonoscopy  With recent flare up  With bleeding better on meselamine            Cardiovascular and Mediastinum    Essential hypertension - Primary     Well controlled on current therapy continue with current medications and will reassess next visit              Nervous and Auditory    Benign paroxysmal positional vertigo     No recent flare ups            Musculoskeletal and Integument    Degenerative tear of acetabular labrum of left hip     Sees ortho dr Randy Mai gets periods of severe pain will try occasional tylenol #3 for severe pain         Relevant Medications    acetaminophen-codeine (TYLENOL #3) 300-30 mg per tablet    Candida infection of flexural skin     Uses nystatin prn            Other    Dyslipidemia     On red yeast rice         Class 1 obesity due to excess calories with serious comorbidity and body mass index (BMI) of 32 0 to 32 9 in adult     Advise walking 30 minutes avoid sweets and starches         Cigarette smoker     Advised ot stop has chantix at home           Other Visit Diagnoses     Other ulcerative colitis without complications ([facility] )                Subjective: pt here for interval visit     Patient ID: Josr Ro is a 60 y o  female  HPI    The following portions of the patient's history were reviewed and updated as appropriate:   Past Medical History:  She has a past medical history of Colon polyp, GERD (gastroesophageal reflux disease), HL (hearing loss), Hyperlipidemia, Hypertension, Joint pain, Tinnitus, [facility] and Vertigo  ,  _______________________________________________________________________  Medical Problems:  does not have any pertinent problems on file ,  _______________________________________________________________________  Past Surgical History:   has a past surgical history that includes  section (N/A, [id number]); FL injection left hip (non arthrogram) (12/10/2018); Colonoscopy (2017); Mammo (historical) (2020); FL injection left hip (non arthrogram) (2020); Colonoscopy; and [place] tooth extraction  ,  _______________________________________________________________________  Family History:  family history includes Breast cancer in her sister; Colon cancer in her father; Diabetes in her mother; Heart disease in her father and mother; Thyroid disease in her brother ,  _______________________________________________________________________  Social History:   reports that she has been smoking cigarettes  She started smoking about 41 years ago  She has a 15 00 pack-year smoking history  She has never used smokeless tobacco  She reports current alcohol use  She reports that she does not use drugs  ,  _______________________________________________________________________  Allergies:  has No Known Allergies     _______________________________________________________________________  Current Outpatient Medications   Medication Sig Dispense Refill    Cholecalciferol (Vitamin D) 125 MCG (5000 UT) CAPS Take by mouth      co-enzyme Q-10 30 MG capsule Take 30 mg by mouth 3 (three) times a day      estradiol (ESTRACE) 0 1 mg/g vaginal cream Pea sized amount of cream to vaginal opening QHS three times a week      mesalamine (APRISO) 0 375 g 24 hr capsule Take 2 capsules (0 75 g total) by mouth 2 (two) times a day 120 capsule 5    nystatin (MYCOSTATIN) cream Apply topically 2 (two) times a day (Patient taking differently: Apply topically as needed ) 60 g 1    Red Yeast Rice Extract (RED YEAST RICE PO) Take by mouth      valsartan (DIOVAN) 80 mg tablet Take 1 tablet (80 mg total) by mouth daily (Patient taking differently: Take 40 mg by mouth daily  ) 30 tablet 6    acetaminophen-codeine (TYLENOL #3) 300-30 mg per tablet Take 1 tablet by mouth every 8 (eight) hours as needed for moderate pain 15 tablet 0     No current facility-administered medications for this visit      _______________________________________________________________________  Review of Systems   Constitutional: Negative for appetite change, chills, fatigue and fever  Respiratory: Negative for cough, chest tightness and shortness of breath  Cardiovascular: Negative for chest pain, palpitations and leg swelling  Gastrointestinal: Negative for abdominal pain, constipation, diarrhea, nausea and vomiting  Genitourinary: Negative for difficulty urinating and frequency  Musculoskeletal: Positive for arthralgias (left hip)  Negative for back pain and neck pain  Skin: Negative for rash  Neurological: Negative for dizziness, weakness, light-headedness, numbness and headaches  Hematological: Does not bruise/bleed easily  Psychiatric/Behavioral: Negative for dysphoric mood and sleep disturbance  The patient is not nervous/anxious  Objective:  Vitals:    01/21/22 1438   BP: 120/70   BP Location: Left arm   Patient Position: Sitting   Cuff Size: Large   Pulse: 84   Resp: 16   Temp: (!) 97 °F (36 1 °C)   TempSrc: Temporal   SpO2: 99%   Weight: 81 2 kg (179 lb)   Height: 5' 2" (1 575 m)     Body mass index is 32 74 kg/m²  Physical Exam  Vitals reviewed  Constitutional:       General: She is not in acute distress  Appearance: Normal appearance  She is well-developed  She is obese  She is not ill-appearing  HENT:      Mouth/Throat:      Mouth: Mucous membranes are moist    Eyes:      Extraocular Movements: Extraocular movements intact  Conjunctiva/sclera: Conjunctivae normal       Pupils: Pupils are equal, round, and reactive to light  Neck:      Thyroid: No thyromegaly  Vascular: No carotid bruit  Cardiovascular:      Rate and Rhythm: Normal rate and regular rhythm  Pulses: Normal pulses  Heart sounds: Normal heart sounds  No murmur heard        Pulmonary:      Effort: Pulmonary effort is normal  No respiratory distress  Breath sounds: Normal breath sounds  Chest:      Chest wall: No tenderness  Abdominal:      General: Bowel sounds are normal  There is no distension  Palpations: Abdomen is soft  Tenderness: There is no abdominal tenderness  Musculoskeletal:      Cervical back: Normal range of motion and neck supple  Lymphadenopathy:      Cervical: No cervical adenopathy  Skin:     General: Skin is warm and dry  Neurological:      General: No focal deficit present  Mental Status: She is alert and oriented to person, place, and time  Mental status is at baseline  Cranial Nerves: No cranial nerve deficit        Deep Tendon Reflexes: Reflexes normal    Psychiatric:         Mood and Affect: Mood normal          Behavior: Behavior normal

## 2022-01-21 NOTE — ASSESSMENT & PLAN NOTE
Sees ortho dr Lauren Watt gets periods of severe pain will try occasional tylenol #3 for severe pain

## 2022-02-10 ENCOUNTER — OFFICE VISIT (OUTPATIENT)
Dept: GASTROENTEROLOGY | Facility: CLINIC | Age: 57
End: 2022-02-10
Payer: COMMERCIAL

## 2022-02-10 VITALS
DIASTOLIC BLOOD PRESSURE: 68 MMHG | HEART RATE: 75 BPM | BODY MASS INDEX: 32.57 KG/M2 | WEIGHT: 177 LBS | SYSTOLIC BLOOD PRESSURE: 122 MMHG | HEIGHT: 62 IN

## 2022-02-10 DIAGNOSIS — Z80.0 FAMILY HISTORY OF COLON CANCER IN FATHER: ICD-10-CM

## 2022-02-10 DIAGNOSIS — K51.311 ULCERATIVE RECTOSIGMOIDITIS WITH RECTAL BLEEDING (HCC): Primary | ICD-10-CM

## 2022-02-10 DIAGNOSIS — Z86.010 HISTORY OF COLON POLYPS: ICD-10-CM

## 2022-02-10 PROCEDURE — 99214 OFFICE O/P EST MOD 30 MIN: CPT | Performed by: INTERNAL MEDICINE

## 2022-02-10 PROCEDURE — 4004F PT TOBACCO SCREEN RCVD TLK: CPT | Performed by: INTERNAL MEDICINE

## 2022-02-10 PROCEDURE — 3008F BODY MASS INDEX DOCD: CPT | Performed by: INTERNAL MEDICINE

## 2022-02-10 NOTE — PROGRESS NOTES
[facility] [address] [facility] [facility] - Outpatient Follow-up Note  Toro Gutierrez 60 y o  female MRN: [id number]  Encounter: [id number]          ASSESSMENT AND PLAN:      1  Ulcerative rectosigmoiditis with rectal bleeding ([facility] )    2  Family history of colon cancer in father    3  History of colon polyps      History of ulcerative rectosigmoiditis diet is clinically doing well on current regimen, continue same, complications discussed, side effects of medication reviewed  Repeat colonoscopy in 3 years  Family history of colon cancer and polyps  Follow-up in my office in 1 year   ______________________________________________________________________    SUBJECTIVE:   Patient came in for evaluation of ulcerative colitis , had some diarrhea blood in stool, feeling much better now on medication, recent colonoscopy biopsy results consistent with ulcerative rectosigmoiditis  Denies any tenesmus nocturnal symptom abdominal pain nausea vomiting fever chills rash coughing choking spells chest pain shortness of breath  Father had colon cancer at age 61  Two Nephew nieces have ulcerative colitis/Crohn's disease as well  Diet medications more than 10 systems reviewed  REVIEW OF SYSTEMS IS OTHERWISE NEGATIVE        Historical Information   Past Medical History:   Diagnosis Date    Colon polyp     GERD (gastroesophageal reflux disease)     19 under control at this time, no meds    HL (hearing loss)     Hyperlipidemia     Hypertension     Joint pain     left hip labral tear    Tinnitus     Ulcerative colitis ([facility] )     19 no meds at this time    Vertigo      Past Surgical History:   Procedure Laterality Date     SECTION N/A 528,5345    COLONOSCOPY  2017    COLONOSCOPY      FL INJECTION LEFT HIP (NON ARTHROGRAM)  12/10/2018    FL INJECTION LEFT HIP (NON ARTHROGRAM)  2020    MAMMO (HISTORICAL)  2020    WISDOM TOOTH EXTRACTION       Social History   Social History     Substance and Sexual Activity   Alcohol Use Yes    Comment: occasional     Social History     Substance and Sexual Activity   Drug Use Never     Social History     Tobacco Use   Smoking Status Current Every Day Smoker    Packs/day: 0 50    Years: 30 00    Pack years: 15 00    Types: Cigarettes    Start date: 9/3/1980   Smokeless Tobacco Never Used   Tobacco Comment    started at age 23 per [place]     Family History   Problem Relation Age of Onset    Heart disease Mother     Diabetes Mother     Heart disease Father     Colon cancer Father     Breast cancer Sister     Thyroid disease Brother        Meds/Allergies       Current Outpatient Medications:     Cholecalciferol (Vitamin D) 125 MCG (5000 UT) CAPS    co-enzyme Q-10 30 MG capsule    estradiol (ESTRACE) 0 1 mg/g vaginal cream    mesalamine (APRISO) 0 375 g 24 hr capsule    nystatin (MYCOSTATIN) cream    Red Yeast Rice Extract (RED YEAST RICE PO)    valsartan (DIOVAN) 80 mg tablet    acetaminophen-codeine (TYLENOL #3) 300-30 mg per tablet    No Known Allergies        Objective     Blood pressure 122/68, pulse 75, height 5' 2" (1 575 m), weight 80 3 kg (177 lb)  Body mass index is 32 37 kg/m²  PHYSICAL EXAM:      General Appearance:   Alert, cooperative, no distress   HEENT:   Normocephalic, atraumatic, anicteric  Neck:  Supple, symmetrical, trachea midline   Lungs:   Clear to auscultation bilaterally; no rales, rhonchi or wheezing; respirations unlabored    Heart[de-identified]   Regular rate and rhythm; no murmur  Abdomen:   Soft, non-tender, non-distended; normal bowel sounds; no masses, no organomegaly    Genitalia:   Deferred    Rectal:   Deferred    Extremities:  No cyanosis, clubbing or edema    Skin:  No jaundice, rashes, or lesions    Lymph nodes:  No palpable cervical lymphadenopathy        Lab Results:   No visits with results within 1 Day(s) from this visit     Latest known visit with results is:   Hospital Outpatient Visit on 12/29/2021   Component Date Value    Case Report 12/29/2021                      Value:Surgical Pathology Report                         Case: [id number]                                   Authorizing Provider:  Darlene Garces MD           Collected:           12/29/2021 1025              Ordering Location:     [address] [place] [address] Received:            12/29/2021 2128                                     [place]                                                                  Pathologist:           Amarilys Dickinson MD                                                         Specimens:   A) - Large Intestine, Right/Ascending Colon, cold snare ascending polyp                             B) - Large Intestine, Sigmoid Colon, cold bx sigmoid hx of UC                                       C) - Rectum, cold bx rectum erythema hx of UC                                              Final Diagnosis 12/29/2021                      Value: This result contains rich text formatting which cannot be displayed here   Additional Information 12/29/2021                      Value: This result contains rich text formatting which cannot be displayed here  Chuck Cam Gross Description 12/29/2021                      Value: This result contains rich text formatting which cannot be displayed here  Radiology Results:   No results found

## 2022-03-29 DIAGNOSIS — K51.311 ULCERATIVE RECTOSIGMOIDITIS WITH RECTAL BLEEDING (HCC): ICD-10-CM

## 2022-03-29 RX ORDER — MESALAMINE 375 MG/1
750 CAPSULE, EXTENDED RELEASE ORAL 2 TIMES DAILY
Qty: 120 CAPSULE | Refills: 0 | Status: SHIPPED | OUTPATIENT
Start: 2022-03-29 | End: 2022-03-29 | Stop reason: SDUPTHER

## 2022-05-03 ENCOUNTER — CLINICAL SUPPORT (OUTPATIENT)
Dept: FAMILY MEDICINE CLINIC | Facility: CLINIC | Age: 57
End: 2022-05-03

## 2022-05-03 DIAGNOSIS — Z03.818 ENCNTR FOR OBS FOR SUSP EXPSR TO OTH BIOLG AGENTS RULED OUT: Primary | ICD-10-CM

## 2022-05-03 PROCEDURE — U0005 INFEC AGEN DETEC AMPLI PROBE: HCPCS | Performed by: FAMILY MEDICINE

## 2022-05-03 PROCEDURE — U0003 INFECTIOUS AGENT DETECTION BY NUCLEIC ACID (DNA OR RNA); SEVERE ACUTE RESPIRATORY SYNDROME CORONAVIRUS 2 (SARS-COV-2) (CORONAVIRUS DISEASE [COVID-19]), AMPLIFIED PROBE TECHNIQUE, MAKING USE OF HIGH THROUGHPUT TECHNOLOGIES AS DESCRIBED BY CMS-2020-01-R: HCPCS | Performed by: FAMILY MEDICINE

## 2022-05-04 LAB — SARS-COV-2 RNA RESP QL NAA+PROBE: NEGATIVE

## 2022-05-06 ENCOUNTER — CLINICAL SUPPORT (OUTPATIENT)
Dept: FAMILY MEDICINE CLINIC | Facility: CLINIC | Age: 57
End: 2022-05-06

## 2022-05-06 DIAGNOSIS — Z11.9 ENCOUNTER FOR SCREENING FOR INFECTIOUS AND PARASITIC DISEASES, UNSPECIFIED: Primary | ICD-10-CM

## 2022-05-06 PROCEDURE — U0003 INFECTIOUS AGENT DETECTION BY NUCLEIC ACID (DNA OR RNA); SEVERE ACUTE RESPIRATORY SYNDROME CORONAVIRUS 2 (SARS-COV-2) (CORONAVIRUS DISEASE [COVID-19]), AMPLIFIED PROBE TECHNIQUE, MAKING USE OF HIGH THROUGHPUT TECHNOLOGIES AS DESCRIBED BY CMS-2020-01-R: HCPCS | Performed by: FAMILY MEDICINE

## 2022-05-06 PROCEDURE — U0005 INFEC AGEN DETEC AMPLI PROBE: HCPCS | Performed by: FAMILY MEDICINE

## 2022-05-07 ENCOUNTER — NURSE TRIAGE (OUTPATIENT)
Dept: OTHER | Facility: OTHER | Age: 57
End: 2022-05-07

## 2022-05-07 LAB — SARS-COV-2 RNA RESP QL NAA+PROBE: POSITIVE

## 2022-05-07 NOTE — TELEPHONE ENCOUNTER
Regarding: Covid treatment medication   ----- Message from [facility] sent at 5/7/2022 11:09 AM EDT -----  "I tested positive for covid, is there any medication I can take for treatment"

## 2022-05-07 NOTE — TELEPHONE ENCOUNTER
Reason for Disposition   [1] COVID-19 diagnosed by positive lab test (e g , PCR, rapid self-test kit) AND [2] mild symptoms (e g , cough, fever, others) AND [2] no complications or SOB    Answer Assessment - Initial Assessment Questions  1  COVID-19 DIAGNOSIS: "Who made your COVID-19 diagnosis?" "Was it confirmed by a positive lab test or self-test?" If not diagnosed by a doctor (or NP/PA), ask "Are there lots of cases (community spread) where you live?" Note: See [facility] health department website, if unsure  pcr positive test    2  COVID-19 EXPOSURE: "Was there any known exposure to COVID before the symptoms began?" CDC Definition of close contact: within 6 feet (2 meters) for a total of 15 minutes or more over a 24-hour period  Yes    3  ONSET: "When did the COVID-19 symptoms start?"       Yesterday    4  WORST SYMPTOM: "What is your worst symptom?" (e g , cough, fever, shortness of breath, muscle aches)     Headache    5  COUGH: "Do you have a cough?" If Yes, ask: "How bad is the cough?"        Denies    6  FEVER: "Do you have a fever?" If Yes, ask: "What is your temperature, how was it measured, and when did it start?"      Denies    7  RESPIRATORY STATUS: "Describe your breathing?" (e g , shortness of breath, wheezing, unable to speak)       Denies    8  BETTER-SAME-WORSE: "Are you getting better, staying the same or getting worse compared to yesterday?"  If getting worse, ask, "In what way?"      About the same    9  HIGH RISK DISEASE: "Do you have any chronic medical problems?" (e g , asthma, heart or lung disease, weak immune system, obesity, etc )      HTN    10  VACCINE: "Have you had the COVID-19 vaccine?" If Yes, ask: "Which one, how many shots, when did you get it?"        Yes    11  BOOSTER: "Have you received your COVID-19 booster?" If Yes, ask: "Which one and when did you get it?"        Yes    12   PREGNANCY: "Is there any chance you are pregnant?" "When was your last menstrual period?"        N/A    13  OTHER SYMPTOMS: "Do you have any other symptoms?"  (e g , chills, fatigue, headache, loss of smell or taste, muscle pain, sore throat)        Ringing in ears    14   O2 SATURATION MONITOR:  "Do you use an oxygen saturation monitor (pulse oximeter) at home?" If Yes, ask "What is your reading (oxygen level) today?" "What is your usual oxygen saturation reading?" (e g , 95%)        denies    Protocols used: CORONAVIRUS (COVID-19) DIAGNOSED OR SUSPECTED-ADULT-

## 2022-05-12 ENCOUNTER — CLINICAL SUPPORT (OUTPATIENT)
Dept: FAMILY MEDICINE CLINIC | Facility: CLINIC | Age: 57
End: 2022-05-12

## 2022-05-12 DIAGNOSIS — Z11.9 ENCOUNTER FOR SCREENING FOR INFECTIOUS AND PARASITIC DISEASES, UNSPECIFIED: Primary | ICD-10-CM

## 2022-05-12 PROCEDURE — U0003 INFECTIOUS AGENT DETECTION BY NUCLEIC ACID (DNA OR RNA); SEVERE ACUTE RESPIRATORY SYNDROME CORONAVIRUS 2 (SARS-COV-2) (CORONAVIRUS DISEASE [COVID-19]), AMPLIFIED PROBE TECHNIQUE, MAKING USE OF HIGH THROUGHPUT TECHNOLOGIES AS DESCRIBED BY CMS-2020-01-R: HCPCS | Performed by: FAMILY MEDICINE

## 2022-05-12 PROCEDURE — U0005 INFEC AGEN DETEC AMPLI PROBE: HCPCS | Performed by: FAMILY MEDICINE

## 2022-05-13 LAB — SARS-COV-2 RNA RESP QL NAA+PROBE: NEGATIVE

## 2022-05-27 ENCOUNTER — OFFICE VISIT (OUTPATIENT)
Dept: FAMILY MEDICINE CLINIC | Facility: CLINIC | Age: 57
End: 2022-05-27
Payer: COMMERCIAL

## 2022-05-27 VITALS
TEMPERATURE: 97.2 F | DIASTOLIC BLOOD PRESSURE: 70 MMHG | HEIGHT: 62 IN | HEART RATE: 83 BPM | SYSTOLIC BLOOD PRESSURE: 120 MMHG | BODY MASS INDEX: 33.49 KG/M2 | OXYGEN SATURATION: 97 % | WEIGHT: 182 LBS | RESPIRATION RATE: 16 BRPM

## 2022-05-27 DIAGNOSIS — R01.1 NEWLY RECOGNIZED HEART MURMUR: ICD-10-CM

## 2022-05-27 DIAGNOSIS — F17.210 CIGARETTE SMOKER: ICD-10-CM

## 2022-05-27 DIAGNOSIS — I10 ESSENTIAL HYPERTENSION: Primary | ICD-10-CM

## 2022-05-27 DIAGNOSIS — H81.10 BENIGN PAROXYSMAL POSITIONAL VERTIGO, UNSPECIFIED LATERALITY: ICD-10-CM

## 2022-05-27 DIAGNOSIS — K51.80 OTHER ULCERATIVE COLITIS WITHOUT COMPLICATION (HCC): ICD-10-CM

## 2022-05-27 DIAGNOSIS — E78.5 DYSLIPIDEMIA: ICD-10-CM

## 2022-05-27 DIAGNOSIS — R10.12 LEFT UPPER QUADRANT ABDOMINAL PAIN: ICD-10-CM

## 2022-05-27 DIAGNOSIS — E66.09 CLASS 1 OBESITY DUE TO EXCESS CALORIES WITH SERIOUS COMORBIDITY AND BODY MASS INDEX (BMI) OF 33.0 TO 33.9 IN ADULT: ICD-10-CM

## 2022-05-27 PROCEDURE — 3725F SCREEN DEPRESSION PERFORMED: CPT | Performed by: FAMILY MEDICINE

## 2022-05-27 PROCEDURE — 3008F BODY MASS INDEX DOCD: CPT | Performed by: FAMILY MEDICINE

## 2022-05-27 PROCEDURE — 99214 OFFICE O/P EST MOD 30 MIN: CPT | Performed by: FAMILY MEDICINE

## 2022-05-27 PROCEDURE — 4004F PT TOBACCO SCREEN RCVD TLK: CPT | Performed by: FAMILY MEDICINE

## 2022-05-27 NOTE — ASSESSMENT & PLAN NOTE
Was on red yeast rice no longer taking  Did nt tolerate statin would restart RYR check lipids with physical

## 2022-05-27 NOTE — PROGRESS NOTES
Assessment/Plan:         Problem List Items Addressed This Visit        Digestive    Other ulcerative colitis without complication ([address] )     Controlled on meds per GI              Cardiovascular and Mediastinum    Essential hypertension - Primary     Well controlled on current therapy continue with current medications and will reassess next visit                Nervous and Auditory    Benign paroxysmal positional vertigo     Meclizine prn              Other    Dyslipidemia     Was on red yeast rice no longer taking  Did nt tolerate statin would restart RYR check lipids with physical           Class 1 obesity due to excess calories with serious comorbidity and body mass index (BMI) of 33 0 to 33 9 in adult     Discussion on diet and exercise guidelines for weight loss and  health reviewed with pt              Cigarette smoker     Advised to stop pt has chantix at home not ready           Newly recognized heart murmur     Will check echo           Relevant Orders    Echo complete w/ contrast if indicated    Left upper quadrant abdominal pain     Will check abd US           Relevant Orders    US abdomen complete            Subjective: pt here for  Interval visit HTN HL  BPPV  Pt had covid 3 weeks ago has had some sensation of fullness with distention and gassiness in upper left abd  Bms ok passing gas and  Feels ok today  Pt on meds for ulcerative colitis     Patient ID: Milton Harris is a 60 y o  female  HPI    The following portions of the patient's history were reviewed and updated as appropriate:   Past Medical History:  She has a past medical history of Colon polyp, GERD (gastroesophageal reflux disease), HL (hearing loss), Hyperlipidemia, Hypertension, Joint pain, Tinnitus, Ulcerative colitis ([address] ), and Vertigo  ,  _______________________________________________________________________  Medical Problems:  does not have any pertinent problems on file ,  _______________________________________________________________________  Past Surgical History:   has a past surgical history that includes  section (N/A, 104,6232); FL injection left hip (non arthrogram) (12/10/2018); Colonoscopy (2017); Mammo (historical) (2020); FL injection left hip (non arthrogram) (2020); Colonoscopy; and [place] tooth extraction  ,  _______________________________________________________________________  Family History:  family history includes Breast cancer in her sister; Colon cancer in her father; Diabetes in her mother; Heart disease in her father and mother; Thyroid disease in her brother ,  _______________________________________________________________________  Social History:   reports that she has been smoking cigarettes  She started smoking about 41 years ago  She has a 15 00 pack-year smoking history  She has never used smokeless tobacco  She reports current alcohol use  She reports that she does not use drugs  ,  _______________________________________________________________________  Allergies:  has No Known Allergies     _______________________________________________________________________  Current Outpatient Medications   Medication Sig Dispense Refill    Cholecalciferol (Vitamin D) 125 MCG (5000 UT) CAPS Take by mouth      estradiol (ESTRACE) 0 1 mg/g vaginal cream Pea sized amount of cream to vaginal opening QHS three times a week      mesalamine (APRISO) 0 375 g 24 hr capsule Take 2 capsules (0 75 g total) by mouth 2 (two) times a day 360 capsule 3    nystatin (MYCOSTATIN) cream Apply topically 2 (two) times a day (Patient taking differently: Apply topically as needed) 60 g 1    valsartan (DIOVAN) 80 mg tablet Take 1 tablet (80 mg total) by mouth daily (Patient taking differently: Take 40 mg by mouth daily) 30 tablet 6     No current facility-administered medications for this visit  _______________________________________________________________________  Review of Systems   Constitutional: Negative for appetite change, chills, fatigue and fever  Respiratory: Negative for cough, chest tightness and shortness of breath  Cardiovascular: Negative for chest pain, palpitations and leg swelling  Gastrointestinal: Positive for abdominal distention and abdominal pain (left upper gas pain)  Negative for constipation, diarrhea, nausea and vomiting  Genitourinary: Negative for difficulty urinating and frequency  Musculoskeletal: Negative for arthralgias, back pain and neck pain  Skin: Negative for rash  Neurological: Negative for dizziness, weakness, light-headedness, numbness and headaches  Hematological: Does not bruise/bleed easily  Psychiatric/Behavioral: Negative for dysphoric mood and sleep disturbance  The patient is not nervous/anxious  Objective:  Vitals:    05/27/22 1420   BP: 120/70   BP Location: Left arm   Patient Position: Sitting   Cuff Size: Large   Pulse: 83   Resp: 16   Temp: (!) 97 2 °F (36 2 °C)   TempSrc: Temporal   SpO2: 97%   Weight: 82 6 kg (182 lb)   Height: 5' 2" (1 575 m)     Body mass index is 33 29 kg/m²  Physical Exam  Vitals reviewed  Constitutional:       General: She is not in acute distress  Appearance: Normal appearance  She is well-developed  She is not ill-appearing  HENT:      Mouth/Throat:      Mouth: Mucous membranes are moist    Eyes:      Extraocular Movements: Extraocular movements intact  Conjunctiva/sclera: Conjunctivae normal       Pupils: Pupils are equal, round, and reactive to light  Neck:      Thyroid: No thyromegaly  Vascular: No carotid bruit  Cardiovascular:      Rate and Rhythm: Normal rate and regular rhythm  Pulses: Normal pulses  Heart sounds: Murmur: 2/6 systolic  Pulmonary:      Effort: Pulmonary effort is normal  No respiratory distress  Breath sounds: Normal breath sounds  Chest:      Chest wall: No tenderness  Abdominal:      General: Bowel sounds are normal  There is no distension  Palpations: Abdomen is soft  Tenderness: There is abdominal tenderness (mild tenderness LUQ)  Musculoskeletal:      Cervical back: Normal range of motion and neck supple  Lymphadenopathy:      Cervical: No cervical adenopathy  Skin:     General: Skin is warm and dry  Neurological:      General: No focal deficit present  Mental Status: She is alert and oriented to person, place, and time  Mental status is at baseline  Cranial Nerves: No cranial nerve deficit        Deep Tendon Reflexes: Reflexes normal    Psychiatric:         Mood and Affect: Mood normal          Behavior: Behavior normal

## 2022-06-10 ENCOUNTER — HOSPITAL ENCOUNTER (OUTPATIENT)
Dept: NON INVASIVE DIAGNOSTICS | Facility: CLINIC | Age: 57
Discharge: HOME | End: 2022-06-10
Payer: COMMERCIAL

## 2022-06-10 VITALS
BODY MASS INDEX: 33.49 KG/M2 | HEIGHT: 62 IN | HEART RATE: 89 BPM | DIASTOLIC BLOOD PRESSURE: 70 MMHG | SYSTOLIC BLOOD PRESSURE: 120 MMHG | WEIGHT: 182 LBS

## 2022-06-10 DIAGNOSIS — R01.1 NEWLY RECOGNIZED HEART MURMUR: ICD-10-CM

## 2022-06-10 LAB
AORTIC ROOT: 2.6 CM
AORTIC VALVE MEAN VELOCITY: 15.3 M/S
APICAL FOUR CHAMBER EJECTION FRACTION: 55 %
ASCENDING AORTA: 3 CM
AV AREA BY CONTINUOUS VTI: 1.3 CM2
AV AREA PEAK VELOCITY: 1.3 CM2
AV LVOT MEAN GRADIENT: 2 MMHG
AV LVOT PEAK GRADIENT: 4 MMHG
AV MEAN GRADIENT: 11 MMHG
AV PEAK GRADIENT: 21 MMHG
AV VALVE AREA: 1.39 CM2
AV VELOCITY RATIO: 0.44
DOP CALC AO PEAK VEL: 2.32 M/S
DOP CALC AO VTI: 45.44 CM
DOP CALC LVOT AREA: 3.14 CM2
DOP CALC LVOT DIAMETER: 2 CM
DOP CALC LVOT PEAK VEL VTI: 20.08 CM
DOP CALC LVOT PEAK VEL: 1.03 M/S
DOP CALC LVOT STROKE INDEX: 29.9 ML/M2
DOP CALC LVOT STROKE VOLUME: 63.05 CM3
E WAVE DECELERATION TIME: 169 MS
FRACTIONAL SHORTENING: 36 % (ref 28–44)
INTERVENTRICULAR SEPTUM IN DIASTOLE (PARASTERNAL SHORT AXIS VIEW): 1.1 CM
INTERVENTRICULAR SEPTUM: 1.1 CM (ref 0.6–1.1)
LAAS-AP4: 13 CM2
LEFT ATRIUM SIZE: 3.2 CM
LEFT INTERNAL DIMENSION IN SYSTOLE: 2.7 CM (ref 2.1–4)
LEFT VENTRICULAR INTERNAL DIMENSION IN DIASTOLE: 4.2 CM (ref 3.5–6)
LEFT VENTRICULAR POSTERIOR WALL IN END DIASTOLE: 1.1 CM
LEFT VENTRICULAR STROKE VOLUME: 50 ML
LVSV (TEICH): 50 ML
MV E'TISSUE VEL-SEP: 16 CM/S
MV PEAK A VEL: 1.06 M/S
MV PEAK E VEL: 89 CM/S
MV STENOSIS PRESSURE HALF TIME: 49 MS
MV VALVE AREA P 1/2 METHOD: 4.49 CM2
RIGHT ATRIUM AREA SYSTOLE A4C: 10.4 CM2
RIGHT VENTRICLE ID DIMENSION: 3.2 CM
SL CV LV EF: 60
SL CV PED ECHO LEFT VENTRICLE DIASTOLIC VOLUME (MOD BIPLANE) 2D: 77 ML
SL CV PED ECHO LEFT VENTRICLE SYSTOLIC VOLUME (MOD BIPLANE) 2D: 27 ML
TR MAX PG: 30 MMHG
TR PEAK VELOCITY: 2.8 M/S
TRICUSPID VALVE PEAK REGURGITATION VELOCITY: 2.76 M/S

## 2022-06-10 PROCEDURE — 93306 TTE W/DOPPLER COMPLETE: CPT

## 2022-06-10 PROCEDURE — 93306 TTE W/DOPPLER COMPLETE: CPT | Performed by: INTERNAL MEDICINE

## 2022-08-19 ENCOUNTER — OFFICE VISIT (OUTPATIENT)
Dept: OBGYN CLINIC | Facility: MEDICAL CENTER | Age: 57
End: 2022-08-19
Payer: COMMERCIAL

## 2022-08-19 ENCOUNTER — APPOINTMENT (OUTPATIENT)
Dept: RADIOLOGY | Facility: MEDICAL CENTER | Age: 57
End: 2022-08-19
Payer: COMMERCIAL

## 2022-08-19 ENCOUNTER — TELEPHONE (OUTPATIENT)
Dept: OBGYN CLINIC | Facility: HOSPITAL | Age: 57
End: 2022-08-19

## 2022-08-19 VITALS
BODY MASS INDEX: 33.49 KG/M2 | WEIGHT: 182 LBS | SYSTOLIC BLOOD PRESSURE: 146 MMHG | DIASTOLIC BLOOD PRESSURE: 83 MMHG | HEART RATE: 88 BPM | HEIGHT: 62 IN

## 2022-08-19 DIAGNOSIS — M25.859 FEMOROACETABULAR IMPINGEMENT: ICD-10-CM

## 2022-08-19 DIAGNOSIS — M25.552 LEFT HIP PAIN: Primary | ICD-10-CM

## 2022-08-19 DIAGNOSIS — M24.152 DEGENERATIVE TEAR OF ACETABULAR LABRUM OF LEFT HIP: ICD-10-CM

## 2022-08-19 DIAGNOSIS — M25.552 LEFT HIP PAIN: ICD-10-CM

## 2022-08-19 PROCEDURE — 99203 OFFICE O/P NEW LOW 30 MIN: CPT | Performed by: ORTHOPAEDIC SURGERY

## 2022-08-19 PROCEDURE — 73502 X-RAY EXAM HIP UNI 2-3 VIEWS: CPT

## 2022-08-19 NOTE — TELEPHONE ENCOUNTER
Patient has injection scheduled for 8/30, please return call, would like to know if injection authorized and if she needs f/u appt

## 2022-08-19 NOTE — PROGRESS NOTES
Assessment:  1  Left hip pain  XR hip/pelv 2-3 vws left if performed   2  Degenerative tear of acetabular labrum of left hip     3  Femoroacetabular impingement         Plan:  L Hip ULYSSES, superior labral tear  Outpatient referral for intra-articular hip injection  F/u 3 months    To do next visit:  No follow-ups on file  The above stated was discussed in layman's terms and the patient expressed understanding  All questions were answered to the patient's satisfaction  Scribe Attestation    I,:   am acting as a scribe while in the presence of the attending physician :       I,:   personally performed the services described in this documentation    as scribed in my presence :             Subjective:   Everton Lawrence is a 59 y o  female  who presents with left hip pain for several years but more recently with increased pain over the past month  She has seen Dr Joshua Candelario in the past treated with 2 intra-articular corticosteroid injections with significant relief (last inj 2 year ago) until recently  She had MRI showing a labral tear in the anterior superior and posterior superior portion of the rim  Today her pain is lateral and in the groin, pain was moderated, altered her gait, worse with sitting for prolonged period of time and going upstairs  No pain with getting in and out of her car or tying her shoes  Also having burning pain down her medial thigh to ankle  She took a medrol dosepak  pmhx sig for smoking and ulcerative colitis         Review of systems negative unless otherwise specified in HPI    Past Medical History:   Diagnosis Date    Colon polyp     GERD (gastroesophageal reflux disease)     4/6/21 under control at this time, no meds    HL (hearing loss)     Hyperlipidemia     Hypertension     Joint pain     left hip labral tear    Tinnitus     Ulcerative [address] [address] )     4/6/21 no meds at this time    Vertigo        Past Surgical History:   Procedure Laterality Date   SECTION N/A [id number]    COLONOSCOPY  2017    COLONOSCOPY      FL INJECTION LEFT HIP (NON ARTHROGRAM)  12/10/2018    FL INJECTION LEFT HIP (NON ARTHROGRAM)  2020    MAMMO (HISTORICAL)  2020    WISDOM TOOTH EXTRACTION         Family History   Problem Relation Age of Onset    Heart disease Mother     Diabetes Mother     Heart disease Father     Colon cancer Father     Breast cancer Sister     Thyroid disease Brother        Social History     Occupational History    Not on file   Tobacco Use    Smoking status: Current Every Day Smoker     Packs/day: 0 50     Years: 30 00     Pack years: 15 00     Types: Cigarettes     Start date: 9/3/1980    Smokeless tobacco: Never Used    Tobacco comment: started at age 23 per [place]   Vaping Use    Vaping Use: Never used   Substance and Sexual Activity    Alcohol use: Yes     Comment: occasional    Drug use: Never    Sexual activity: Yes     Partners: Male     Birth control/protection: None         Current Outpatient Medications:     Cholecalciferol (Vitamin D) 125 MCG (5000 UT) CAPS, Take by mouth, Disp: , Rfl:     estradiol (ESTRACE) 0 1 mg/g vaginal cream, Pea sized amount of cream to vaginal opening QHS three times a week, Disp: , Rfl:     nystatin (MYCOSTATIN) cream, Apply topically 2 (two) times a day (Patient taking differently: Apply topically as needed), Disp: 60 g, Rfl: 1    valsartan (DIOVAN) 80 mg tablet, Take 1 tablet (80 mg total) by mouth daily (Patient taking differently: Take 40 mg by mouth daily), Disp: 30 tablet, Rfl: 6    mesalamine (APRISO) 0 375 g 24 hr capsule, Take 2 capsules (0 75 g total) by mouth 2 (two) times a day, Disp: 360 capsule, Rfl: 3    No Known Allergies         Vitals:    22 0927   BP: 146/83   Pulse: 88       Objective:  Physical exam  · General: Awake, Alert, Oriented  · Eyes: Pupils equal, round and reactive to light  · Heart: regular rate and rhythm  · Lungs: No audible wheezing  · Abdomen: soft                    Left Hip Exam     Tenderness   The patient is experiencing tenderness in the lateral     Range of Motion   Abduction: 40   Adduction: 20   Extension: -10   Flexion: 110   External rotation: 40   Internal rotation: 10     Muscle Strength   The patient has normal left hip strength  Tests   JOLYNN: negative    Other   Erythema: absent  Scars: absent  Sensation: normal  Pulse: present    Comments:  +FADIR, - SLR              Diagnostics, reviewed and taken today if performed as documented: The attending physician has personally reviewed the pertinent films in PACS and interpretation is as follows:  AP pelvis, AP and lateral x-rays of the left hip demonstrate cam and pincer lesions, joint spaces preserved, no evidence of osteonecrosis    Procedures, if performed today:    Procedures    None performed      Portions of the record may have been created with voice recognition software  Occasional wrong word or "sound a like" substitutions may have occurred due to the inherent limitations of voice recognition software  Read the chart carefully and recognize, using context, where substitutions have occurred

## 2022-08-22 NOTE — NURSING NOTE
Call placed to patient to discuss upcoming appointment at [facility] radiology department and complete consultation with patient  Patient is having left hip CSI  utilizing fluoroscopy guidance  Reviewed  patient's allergies , no current anticoagulant medication present , patient has had procedure in the past, no questions when asked if any questions or concerns  Reminded patient of location and time expected for procedure, Patient expressed understanding by verbalizing and repeating instructions

## 2022-08-28 LAB
BASOPHILS # BLD AUTO: 38 CELLS/UL (ref 0–200)
BASOPHILS NFR BLD AUTO: 0.6 %
BUN SERPL-MCNC: 14 MG/DL (ref 7–25)
BUN/CREAT SERPL: NORMAL (CALC) (ref 6–22)
CALCIUM SERPL-MCNC: 9.3 MG/DL (ref 8.6–10.4)
CHLORIDE SERPL-SCNC: 104 MMOL/L (ref 98–110)
CO2 SERPL-SCNC: 25 MMOL/L (ref 20–32)
CREAT SERPL-MCNC: 0.85 MG/DL (ref 0.5–1.03)
EOSINOPHIL # BLD AUTO: 63 CELLS/UL (ref 15–500)
EOSINOPHIL NFR BLD AUTO: 1 %
ERYTHROCYTE [DISTWIDTH] IN BLOOD BY AUTOMATED COUNT: 12.2 % (ref 11–15)
GFR/BSA.PRED SERPLBLD CYS-BASED-ARV: 79 ML/MIN/1.73M2
GLUCOSE SERPL-MCNC: 99 MG/DL (ref 65–99)
HBA1C MFR BLD: 5.4 % OF TOTAL HGB
HBV SURFACE AB SER QL IA: NORMAL
HCT VFR BLD AUTO: 42.3 % (ref 35–45)
HCV AB S/CO SERPL IA: <0.02
HCV AB SERPL QL IA: NORMAL
HGB BLD-MCNC: 14.3 G/DL (ref 11.7–15.5)
LYMPHOCYTES # BLD AUTO: 1310 CELLS/UL (ref 850–3900)
LYMPHOCYTES NFR BLD AUTO: 20.8 %
MCH RBC QN AUTO: 32.1 PG (ref 27–33)
MCHC RBC AUTO-ENTMCNC: 33.8 G/DL (ref 32–36)
MCV RBC AUTO: 95.1 FL (ref 80–100)
MONOCYTES # BLD AUTO: 491 CELLS/UL (ref 200–950)
MONOCYTES NFR BLD AUTO: 7.8 %
NEUTROPHILS # BLD AUTO: 4397 CELLS/UL (ref 1500–7800)
NEUTROPHILS NFR BLD AUTO: 69.8 %
PLATELET # BLD AUTO: 260 THOUSAND/UL (ref 140–400)
PMV BLD REES-ECKER: 10.1 FL (ref 7.5–12.5)
POTASSIUM SERPL-SCNC: 4.3 MMOL/L (ref 3.5–5.3)
RBC # BLD AUTO: 4.45 MILLION/UL (ref 3.8–5.1)
SODIUM SERPL-SCNC: 137 MMOL/L (ref 135–146)
WBC # BLD AUTO: 6.3 THOUSAND/UL (ref 3.8–10.8)

## 2022-08-30 ENCOUNTER — HOSPITAL ENCOUNTER (OUTPATIENT)
Dept: RADIOLOGY | Facility: HOSPITAL | Age: 57
Discharge: HOME | End: 2022-08-30
Admitting: RADIOLOGY
Payer: COMMERCIAL

## 2022-08-30 DIAGNOSIS — M25.552 LEFT HIP PAIN: ICD-10-CM

## 2022-08-30 PROCEDURE — 20610 DRAIN/INJ JOINT/BURSA W/O US: CPT

## 2022-08-30 PROCEDURE — 77002 NEEDLE LOCALIZATION BY XRAY: CPT

## 2022-08-30 RX ORDER — BETAMETHASONE SODIUM PHOSPHATE AND BETAMETHASONE ACETATE 3; 3 MG/ML; MG/ML
12 INJECTION, SUSPENSION INTRA-ARTICULAR; INTRALESIONAL; INTRAMUSCULAR; SOFT TISSUE ONCE
Status: COMPLETED | OUTPATIENT
Start: 2022-08-30 | End: 2022-08-30

## 2022-08-30 RX ORDER — BUPIVACAINE HYDROCHLORIDE 2.5 MG/ML
10 INJECTION, SOLUTION EPIDURAL; INFILTRATION; INTRACAUDAL
Status: COMPLETED | OUTPATIENT
Start: 2022-08-30 | End: 2022-08-30

## 2022-08-30 RX ORDER — LIDOCAINE HYDROCHLORIDE 10 MG/ML
10 INJECTION, SOLUTION EPIDURAL; INFILTRATION; INTRACAUDAL; PERINEURAL
Status: COMPLETED | OUTPATIENT
Start: 2022-08-30 | End: 2022-08-30

## 2022-08-30 RX ADMIN — IOHEXOL 2 ML: 300 INJECTION, SOLUTION INTRAVENOUS at 15:45

## 2022-08-30 RX ADMIN — BUPIVACAINE HYDROCHLORIDE 2 ML: 2.5 INJECTION, SOLUTION EPIDURAL; INFILTRATION; INTRACAUDAL; PERINEURAL at 15:40

## 2022-08-30 RX ADMIN — LIDOCAINE HYDROCHLORIDE 6 ML: 10 INJECTION, SOLUTION EPIDURAL; INFILTRATION; INTRACAUDAL; PERINEURAL at 15:40

## 2022-08-30 RX ADMIN — BETAMETHASONE SODIUM PHOSPHATE AND BETAMETHASONE ACETATE 12 MG: 3; 3 INJECTION, SUSPENSION INTRA-ARTICULAR; INTRALESIONAL; INTRAMUSCULAR at 15:40

## 2022-09-16 ENCOUNTER — OFFICE VISIT (OUTPATIENT)
Dept: FAMILY MEDICINE CLINIC | Facility: CLINIC | Age: 57
End: 2022-09-16
Payer: COMMERCIAL

## 2022-09-16 VITALS
DIASTOLIC BLOOD PRESSURE: 80 MMHG | WEIGHT: 177 LBS | HEIGHT: 62 IN | SYSTOLIC BLOOD PRESSURE: 130 MMHG | TEMPERATURE: 98.7 F | OXYGEN SATURATION: 97 % | HEART RATE: 80 BPM | RESPIRATION RATE: 16 BRPM | BODY MASS INDEX: 32.57 KG/M2

## 2022-09-16 DIAGNOSIS — F17.210 CIGARETTE SMOKER: ICD-10-CM

## 2022-09-16 DIAGNOSIS — I10 ESSENTIAL HYPERTENSION: ICD-10-CM

## 2022-09-16 DIAGNOSIS — E78.5 DYSLIPIDEMIA: ICD-10-CM

## 2022-09-16 DIAGNOSIS — Z23 ENCOUNTER FOR IMMUNIZATION: Primary | ICD-10-CM

## 2022-09-16 DIAGNOSIS — K51.80 OTHER ULCERATIVE COLITIS WITHOUT COMPLICATION (HCC): ICD-10-CM

## 2022-09-16 DIAGNOSIS — E04.2 MULTIPLE THYROID NODULES: ICD-10-CM

## 2022-09-16 DIAGNOSIS — Z00.00 ANNUAL PHYSICAL EXAM: ICD-10-CM

## 2022-09-16 DIAGNOSIS — E55.9 VITAMIN D DEFICIENCY: ICD-10-CM

## 2022-09-16 PROBLEM — R10.12 LEFT UPPER QUADRANT ABDOMINAL PAIN: Status: RESOLVED | Noted: 2022-05-27 | Resolved: 2022-09-16

## 2022-09-16 PROCEDURE — 90471 IMMUNIZATION ADMIN: CPT | Performed by: FAMILY MEDICINE

## 2022-09-16 PROCEDURE — 3725F SCREEN DEPRESSION PERFORMED: CPT | Performed by: FAMILY MEDICINE

## 2022-09-16 PROCEDURE — 99396 PREV VISIT EST AGE 40-64: CPT | Performed by: FAMILY MEDICINE

## 2022-09-16 PROCEDURE — 90682 RIV4 VACC RECOMBINANT DNA IM: CPT | Performed by: FAMILY MEDICINE

## 2022-09-16 RX ORDER — VALSARTAN 40 MG/1
40 TABLET ORAL DAILY
Qty: 90 TABLET | Refills: 3 | Status: SHIPPED | OUTPATIENT
Start: 2022-09-16

## 2022-09-16 NOTE — ASSESSMENT & PLAN NOTE
Pt will be scheduling  With Dr Taylor Charles for follow up he will do [address] in office

## 2022-09-16 NOTE — PROGRESS NOTES
Name: Klever Torres      : 1965      MRN: [id number]  Encounter Provider: Linda Olvera MD  Encounter Date: 2022   Encounter department: [address] [place] [address] Dr MEDICINE    Assessment & Plan     1  Encounter for immunization  -     influenza vaccine, quadrivalent, recombinant, PF, 0 5 mL, for patients 18 yr+ (FLUBLOK)    2  Essential hypertension  Assessment & Plan:  Well controlled on current therapy continue with current medications and will reassess next visit      Orders:  -     valsartan (DIOVAN) 40 mg tablet; Take 1 tablet (40 mg total) by mouth daily    3  Other ulcerative colitis without complication ([facility] [address] )  Assessment & Plan:  Controlled on meds      4  Multiple thyroid nodules  Assessment & Plan:  Pt will be scheduling  With Dr Alicia Carl for follow up he will do [address] in office    Orders:  -     TSH, 3rd generation; Future  -     T4, free; Future    5  Dyslipidemia  -     Lipid panel; Future; Expected date: 2022    6  Vitamin D deficiency  Assessment & Plan:  Check level on supplement    Orders:  -     Vitamin D 25 hydroxy; Future    7  Cigarette smoker  Assessment & Plan:  Pt has chantix at home not ready to quit      8  Annual physical exam  Assessment & Plan:  All labs except lipids were done             Subjective      HPI  Pt here for physical and interval visit   Review of Systems   Constitutional: Negative for appetite change, chills, fatigue and fever  Respiratory: Negative for cough, chest tightness and shortness of breath  Cardiovascular: Negative for chest pain, palpitations and leg swelling  Gastrointestinal: Negative for abdominal pain, constipation, diarrhea, nausea and vomiting  Genitourinary: Negative for difficulty urinating and frequency  Musculoskeletal: Negative for arthralgias, back pain and neck pain  Skin: Negative for rash  Neurological: Negative for dizziness, weakness, light-headedness, numbness and headaches     Hematological: Does not bruise/bleed easily  Psychiatric/Behavioral: Negative for dysphoric mood and sleep disturbance  The patient is not nervous/anxious  Current Outpatient Medications on File Prior to Visit   Medication Sig    Cholecalciferol (Vitamin D) 125 MCG (5000 UT) CAPS Take by mouth    estradiol (ESTRACE) 0 1 mg/g vaginal cream Pea sized amount of cream to vaginal opening QHS three times a week    [DISCONTINUED] nystatin (MYCOSTATIN) cream Apply topically 2 (two) times a day (Patient taking differently: Apply topically as needed)    mesalamine (APRISO) 0 375 g 24 hr capsule Take 2 capsules (0 75 g total) by mouth 2 (two) times a day    [DISCONTINUED] valsartan (DIOVAN) 80 mg tablet Take 1 tablet (80 mg total) by mouth daily (Patient taking differently: Take 40 mg by mouth daily)       Objective     /80 (BP Location: Left arm, Patient Position: Sitting, Cuff Size: Standard)   Pulse 80   Temp 98 7 °F (37 1 °C) (Temporal)   Resp 16   Ht 5' 2" (1 575 m)   Wt 80 3 kg (177 lb)   SpO2 97%   BMI 32 37 kg/m²     Physical Exam  Vitals reviewed  Constitutional:       General: She is not in acute distress  Appearance: Normal appearance  She is well-developed  She is not ill-appearing  HENT:      Head: Normocephalic  Right Ear: Tympanic membrane, ear canal and external ear normal       Left Ear: Tympanic membrane, ear canal and external ear normal       Nose: Nose normal       Mouth/Throat:      Mouth: Mucous membranes are moist       Pharynx: No oropharyngeal exudate  Eyes:      General: Lids are normal  No scleral icterus  Extraocular Movements: Extraocular movements intact  Conjunctiva/sclera: Conjunctivae normal       Pupils: Pupils are equal, round, and reactive to light  Neck:      Thyroid: No thyromegaly  Vascular: No carotid bruit  Cardiovascular:      Rate and Rhythm: Normal rate and regular rhythm  Pulses: Normal pulses  Heart sounds: Normal heart sounds   No murmur heard  No friction rub  Pulmonary:      Effort: Pulmonary effort is normal       Breath sounds: Normal breath sounds  No wheezing, rhonchi or rales  Abdominal:      General: Bowel sounds are normal  There is no distension  Palpations: Abdomen is soft  There is no mass  Tenderness: There is no abdominal tenderness  There is no guarding  Hernia: No hernia is present  Musculoskeletal:         General: Normal range of motion  Cervical back: Normal range of motion and neck supple  Lymphadenopathy:      Cervical: No cervical adenopathy  Skin:     General: Skin is warm and dry  Findings: No rash  Comments: No abnormal appearing moles   Neurological:      General: No focal deficit present  Mental Status: She is alert and oriented to person, place, and time  Mental status is at baseline  Cranial Nerves: No cranial nerve deficit  Sensory: No sensory deficit  Motor: No weakness, tremor or abnormal muscle tone  Coordination: Coordination normal       Gait: Gait normal       Deep Tendon Reflexes: Reflexes normal    Psychiatric:         Mood and Affect: Mood normal          Speech: Speech normal          Behavior: Behavior normal      BMI Counseling: Body mass index is 32 37 kg/m²  The BMI is above normal  Nutrition recommendations include 3-5 servings of fruits/vegetables daily, reducing fast food intake, consuming healthier snacks, decreasing soda and/or juice intake, moderation in carbohydrate intake, increasing intake of lean protein and reducing intake of saturated fat and trans fat  Exercise recommendations include exercising 3-5 times per week and strength training exercises    Angel Naik MD

## 2022-11-29 DIAGNOSIS — M25.552 PAIN IN LEFT HIP: Primary | ICD-10-CM

## 2022-12-12 ENCOUNTER — HOSPITAL ENCOUNTER (OUTPATIENT)
Dept: RADIOLOGY | Facility: HOSPITAL | Age: 57
Discharge: HOME | End: 2022-12-12

## 2022-12-12 DIAGNOSIS — M25.552 PAIN IN LEFT HIP: ICD-10-CM

## 2022-12-12 RX ORDER — LIDOCAINE HYDROCHLORIDE 10 MG/ML
7 INJECTION, SOLUTION EPIDURAL; INFILTRATION; INTRACAUDAL; PERINEURAL ONCE
Status: COMPLETED | OUTPATIENT
Start: 2022-12-12 | End: 2022-12-12

## 2022-12-12 RX ORDER — BUPIVACAINE HYDROCHLORIDE 5 MG/ML
3 INJECTION, SOLUTION PERINEURAL ONCE
Status: COMPLETED | OUTPATIENT
Start: 2022-12-12 | End: 2022-12-12

## 2022-12-12 RX ORDER — METHYLPREDNISOLONE ACETATE 80 MG/ML
80 INJECTION, SUSPENSION INTRA-ARTICULAR; INTRALESIONAL; INTRAMUSCULAR; SOFT TISSUE ONCE
Status: COMPLETED | OUTPATIENT
Start: 2022-12-12 | End: 2022-12-12

## 2022-12-12 RX ADMIN — METHYLPREDNISOLONE ACETATE 80 MG: 80 INJECTION, SUSPENSION INTRA-ARTICULAR; INTRALESIONAL; INTRAMUSCULAR; SOFT TISSUE at 09:10

## 2022-12-12 RX ADMIN — LIDOCAINE HYDROCHLORIDE 7 ML: 10 INJECTION, SOLUTION EPIDURAL; INFILTRATION; INTRACAUDAL at 09:10

## 2022-12-12 RX ADMIN — IOHEXOL 10 ML: 300 INJECTION, SOLUTION INTRAVENOUS at 09:10

## 2022-12-12 RX ADMIN — BUPIVACAINE HYDROCHLORIDE 3 ML: 5 INJECTION, SOLUTION PERINEURAL at 09:10

## 2022-12-16 ENCOUNTER — TELEPHONE (OUTPATIENT)
Dept: OBGYN CLINIC | Facility: HOSPITAL | Age: 57
End: 2022-12-16

## 2022-12-16 ENCOUNTER — TELEPHONE (OUTPATIENT)
Dept: OTHER | Facility: HOSPITAL | Age: 57
End: 2022-12-16

## 2022-12-16 NOTE — TELEPHONE ENCOUNTER
Spoke with patient  Worsening L hip pain post L hip steroid injection Monday  Pt denies fever/ chills or pain at puncture site  Pt endorses worsening pain with ambulation better at rest  Encouraged OTC medications  Pt was advised to seek care at ED if persistent or worsening s/s as worse case scenario is infection  PT elected to treat at home and if no improvement will present to ED

## 2022-12-16 NOTE — TELEPHONE ENCOUNTER
Caller: Patient    Doctor: Frankie Croft    Reason for call: patient had a hip injection on 12/12 and was fine until yesterday  She started with hip pain yesterday and it is not at the injection site  The pain is worse  She has had these injections before and has not had pain  She has taken Aleve and that has not done much  Please advise       Call back#: [phone]

## 2022-12-16 NOTE — TELEPHONE ENCOUNTER
Caller: N/A    Doctor: Mariusz Pepe    Reason for call: Can anyone assist with Dr Juancho Ku message?     Call back#: N/A

## 2022-12-17 ENCOUNTER — HOSPITAL ENCOUNTER (EMERGENCY)
Facility: HOSPITAL | Age: 57
Discharge: HOME | End: 2022-12-17
Attending: INTERNAL MEDICINE

## 2022-12-17 ENCOUNTER — APPOINTMENT (EMERGENCY)
Dept: RADIOLOGY | Facility: HOSPITAL | Age: 57
End: 2022-12-17

## 2022-12-17 VITALS
OXYGEN SATURATION: 98 % | RESPIRATION RATE: 18 BRPM | SYSTOLIC BLOOD PRESSURE: 126 MMHG | HEART RATE: 72 BPM | DIASTOLIC BLOOD PRESSURE: 58 MMHG

## 2022-12-17 DIAGNOSIS — M25.552 LEFT HIP PAIN: Primary | ICD-10-CM

## 2022-12-17 LAB
ALBUMIN SERPL BCP-MCNC: 4.2 G/DL (ref 3.5–5)
ALP SERPL-CCNC: 74 U/L (ref 34–104)
ALT SERPL W P-5'-P-CCNC: 16 U/L (ref 7–52)
ANION GAP SERPL CALCULATED.3IONS-SCNC: 9 MMOL/L (ref 4–13)
AST SERPL W P-5'-P-CCNC: 13 U/L (ref 13–39)
BASOPHILS # BLD AUTO: 0.07 THOUSANDS/ÂΜL (ref 0–0.1)
BASOPHILS NFR BLD AUTO: 1 % (ref 0–1)
BILIRUB SERPL-MCNC: 0.48 MG/DL (ref 0.2–1)
BUN SERPL-MCNC: 17 MG/DL (ref 5–25)
CALCIUM SERPL-MCNC: 9.1 MG/DL (ref 8.4–10.2)
CHLORIDE SERPL-SCNC: 105 MMOL/L (ref 96–108)
CO2 SERPL-SCNC: 24 MMOL/L (ref 21–32)
CREAT SERPL-MCNC: 0.86 MG/DL (ref 0.6–1.3)
CRP SERPL QL: 2 MG/L
EOSINOPHIL # BLD AUTO: 0.06 THOUSAND/ÂΜL (ref 0–0.61)
EOSINOPHIL NFR BLD AUTO: 1 % (ref 0–6)
ERYTHROCYTE [DISTWIDTH] IN BLOOD BY AUTOMATED COUNT: 11.9 % (ref 11.6–15.1)
ERYTHROCYTE [SEDIMENTATION RATE] IN BLOOD: 8 MM/HOUR (ref 0–30)
GFR SERPL CREATININE-BSD FRML MDRD: 74 ML/MIN/1.73SQ M
GLUCOSE SERPL-MCNC: 91 MG/DL (ref 65–140)
HCT VFR BLD AUTO: 44.8 % (ref 34.8–46.1)
HGB BLD-MCNC: 15 G/DL (ref 11.5–15.4)
IMM GRANULOCYTES # BLD AUTO: 0.05 THOUSAND/UL (ref 0–0.2)
IMM GRANULOCYTES NFR BLD AUTO: 0 % (ref 0–2)
LYMPHOCYTES # BLD AUTO: 2.51 THOUSANDS/ÂΜL (ref 0.6–4.47)
LYMPHOCYTES NFR BLD AUTO: 22 % (ref 14–44)
MCH RBC QN AUTO: 32.4 PG (ref 26.8–34.3)
MCHC RBC AUTO-ENTMCNC: 33.5 G/DL (ref 31.4–37.4)
MCV RBC AUTO: 97 FL (ref 82–98)
MONOCYTES # BLD AUTO: 0.71 THOUSAND/ÂΜL (ref 0.17–1.22)
MONOCYTES NFR BLD AUTO: 6 % (ref 4–12)
NEUTROPHILS # BLD AUTO: 7.82 THOUSANDS/ÂΜL (ref 1.85–7.62)
NEUTS SEG NFR BLD AUTO: 70 % (ref 43–75)
NRBC BLD AUTO-RTO: 0 /100 WBCS
PLATELET # BLD AUTO: 318 THOUSANDS/UL (ref 149–390)
PMV BLD AUTO: 9.9 FL (ref 8.9–12.7)
POTASSIUM SERPL-SCNC: 4.1 MMOL/L (ref 3.5–5.3)
PROT SERPL-MCNC: 6.9 G/DL (ref 6.4–8.4)
RBC # BLD AUTO: 4.63 MILLION/UL (ref 3.81–5.12)
SODIUM SERPL-SCNC: 138 MMOL/L (ref 135–147)
WBC # BLD AUTO: 11.22 THOUSAND/UL (ref 4.31–10.16)

## 2022-12-17 NOTE — DISCHARGE INSTRUCTIONS
Follow up with your orthopedist in 4-5 days  Take tylenol or motrin for pian if needed  Labs Reviewed   CBC AND DIFFERENTIAL - Abnormal       Result Value Ref Range Status    WBC 11 22 (*) 4 31 - 10 16 Thousand/uL Final    RBC 4 63  3 81 - 5 12 Million/uL Final    Hemoglobin 15 0  11 5 - 15 4 g/dL Final    Hematocrit 44 8  34 8 - 46 1 % Final    MCV 97  82 - 98 fL Final    MCH 32 4  26 8 - 34 3 pg Final    MCHC 33 5  31 4 - 37 4 g/dL Final    RDW 11 9  11 6 - 15 1 % Final    MPV 9 9  8 9 - 12 7 fL Final    Platelets 981  198 - 390 Thousands/uL Final    nRBC 0  /100 WBCs Final    Neutrophils Relative 70  43 - 75 % Final    Immat GRANS % 0  0 - 2 % Final    Lymphocytes Relative 22  14 - 44 % Final    Monocytes Relative 6  4 - 12 % Final    Eosinophils Relative 1  0 - 6 % Final    Basophils Relative 1  0 - 1 % Final    Neutrophils Absolute 7 82 (*) 1 85 - 7 62 Thousands/µL Final    Immature Grans Absolute 0 05  0 00 - 0 20 Thousand/uL Final    Lymphocytes Absolute 2 51  0 60 - 4 47 Thousands/µL Final    Monocytes Absolute 0 71  0 17 - 1 22 Thousand/µL Final    Eosinophils Absolute 0 06  0 00 - 0 61 Thousand/µL Final    Basophils Absolute 0 07  0 00 - 0 10 Thousands/µL Final   SEDIMENTATION RATE - Normal    Sed Rate 8  0 - 30 mm/hour Final   C-REACTIVE PROTEIN - Normal    CRP 2 0  <3 0 mg/L Final   COMPREHENSIVE METABOLIC PANEL    Sodium 470  135 - 147 mmol/L Final    Potassium 4 1  3 5 - 5 3 mmol/L Final    Chloride 105  96 - 108 mmol/L Final    CO2 24  21 - 32 mmol/L Final    ANION GAP 9  4 - 13 mmol/L Final    BUN 17  5 - 25 mg/dL Final    Creatinine 0 86  0 60 - 1 30 mg/dL Final    Comment: Standardized to IDMS reference method    Glucose 91  65 - 140 mg/dL Final    Comment: If the patient is fasting, the ADA then defines impaired fasting glucose as > 100 mg/dL and diabetes as > or equal to 123 mg/dL    Specimen collection should occur prior to Sulfasalazine administration due to the potential for falsely depressed results  Specimen collection should occur prior to Sulfapyridine administration due to the potential for falsely elevated results  Calcium 9 1  8 4 - 10 2 mg/dL Final    AST 13  13 - 39 U/L Final    Comment: Specimen collection should occur prior to Sulfasalazine administration due to the potential for falsely depressed results  ALT 16  7 - 52 U/L Final    Comment: Specimen collection should occur prior to Sulfasalazine administration due to the potential for falsely depressed results  Alkaline Phosphatase 74  34 - 104 U/L Final    Total Protein 6 9  6 4 - 8 4 g/dL Final    Albumin 4 2  3 5 - 5 0 g/dL Final    Total Bilirubin 0 48  0 20 - 1 00 mg/dL Final    eGFR 74  ml/min/1 73sq m Final    Narrative:     [place] guidelines for Chronic Kidney Disease (CKD):     Stage 1 with normal or high GFR (GFR > 90 mL/min/1 73 square meters)    Stage 2 Mild CKD (GFR = 60-89 mL/min/1 73 square meters)    Stage 3A Moderate CKD (GFR = 45-59 mL/min/1 73 square meters)    Stage 3B Moderate CKD (GFR = 30-44 mL/min/1 73 square meters)    Stage 4 Severe CKD (GFR = 15-29 mL/min/1 73 square meters)    Stage 5 End Stage CKD (GFR <15 mL/min/1 73 square meters)  Note: GFR calculation is accurate only with a steady state creatinine     XR hip/pelv 2-3 vws left   Final Result      No acute osseous abnormality              Workstation performed: [id number]

## 2022-12-17 NOTE — ED PROVIDER NOTES
History  Chief Complaint   Patient presents with   • Hip Pain     Pt presents to ed for left sided hip pain that started Thursday, states she got an injection on monday at [facility] and has had them in the past with no issues but this time around she's been very uncomfortable  Spoke with provider who told her to come in for an eval     This is a 59years old came for having left hip pain  Patient has labrum tear on the left hip and she used to take steroid fluoro guided left hip injection, with no issues  Patient received the injection Monday 5/12/22  And she stated that she was fine in the next 2 days but since Thursday(2days ago) she started to feel pain of the hip  Patient has pain when she walk  Patient denies any fever  Patient denies any redness or tingling of the left lower extremity  Patient to contact have orthopedic doctor who told her to go to the ER to be checked for possible infection  At the ER patient has pain and she has full range of motion of the left hip  Patient has history of hypertension and she is not diabetic  Patient denies history of dysuria or hematuria  Patient has no cough  Patient has no abdominal pain nausea vomiting diarrhea  History provided by:  Patient   used: No    Hip Pain  Location:  L hip  Severity:  Moderate  Onset quality:  Sudden  Timing:  Constant  Progression:  Unable to specify  Chronicity:  New  Associated symptoms: no abdominal pain, no chest pain, no congestion, no cough, no diarrhea, no ear pain, no fatigue, no fever, no headaches, no myalgias, no nausea, no rash, no rhinorrhea, no shortness of breath, no sore throat and no vomiting        Prior to Admission Medications   Prescriptions Last Dose Informant Patient Reported? Taking?    Cholecalciferol (Vitamin D) 125 MCG (5000 UT) CAPS   Yes No   Sig: Take by mouth   estradiol (ESTRACE) 0 1 mg/g vaginal cream   Yes No   Sig: Pea sized amount of cream to vaginal opening QHS three times a week   mesalamine (APRISO) 0 375 g 24 hr capsule   No No   Sig: Take 2 capsules (0 75 g total) by mouth 2 (two) times a day   valsartan (DIOVAN) 40 mg tablet   No No   Sig: Take 1 tablet (40 mg total) by mouth daily      Facility-Administered Medications: None       Past Medical History:   Diagnosis Date   • Colon polyp    • GERD (gastroesophageal reflux disease)     19 under control at this time, no meds   • HL (hearing loss)    • Hyperlipidemia    • Hypertension    • Joint pain     left hip labral tear   • Tinnitus    • Ulcerative colitis ([address] )     19 no meds at this time   • Vertigo        Past Surgical History:   Procedure Laterality Date   •  SECTION N/A [id number]   • COLONOSCOPY  2017   • COLONOSCOPY     • FL INJECTION LEFT HIP (NON ARTHROGRAM)  12/10/2018   • FL INJECTION LEFT HIP (NON ARTHROGRAM)  2020   • FL INJECTION LEFT HIP (NON ARTHROGRAM)  2022   • FL INJECTION LEFT HIP (NON ARTHROGRAM)  2022   • MAMMO (HISTORICAL)  2020   • WISDOM TOOTH EXTRACTION         Family History   Problem Relation Age of Onset   • Heart disease Mother    • Diabetes Mother    • Heart disease Father    • Colon cancer Father    • Breast cancer Sister    • Thyroid disease Brother      I have reviewed and agree with the history as documented      E-Cigarette/Vaping   • E-Cigarette Use Never User      E-Cigarette/Vaping Substances   • Nicotine No    • THC No    • CBD No    • Flavoring No    • Other No    • Unknown No      Social History     Tobacco Use   • Smoking status: Every Day     Packs/day: 0 50     Years: 30 00     Pack years: 15 00     Types: Cigarettes     Start date: 9/3/1980   • Smokeless tobacco: Never   • Tobacco comments:     started at age 23 per [facility] pt has not smoked the steady timefram of 30 yrs quit  for 3 yeaes at least 2 times   Vaping Use   • Vaping Use: Never used   Substance Use Topics   • Alcohol use: Yes     Comment: occasional   • Drug use: Never       Review of Systems   Constitutional: Negative for fatigue and fever  HENT: Negative for congestion, ear discharge, ear pain, rhinorrhea, sinus pressure, sinus pain, sneezing and sore throat  Respiratory: Negative for cough and shortness of breath  Cardiovascular: Negative for chest pain and palpitations  Gastrointestinal: Negative for abdominal pain, diarrhea, nausea and vomiting  Genitourinary: Negative for difficulty urinating, dysuria, flank pain and frequency  Musculoskeletal: Positive for arthralgias  Negative for back pain, myalgias, neck pain and neck stiffness  Skin: Negative for color change, pallor and rash  Neurological: Negative for dizziness, light-headedness and headaches  Psychiatric/Behavioral: Negative for agitation and behavioral problems  Physical Exam  Physical Exam  Vitals and nursing note reviewed  Constitutional:       Appearance: She is well-developed  HENT:      Head: Normocephalic and atraumatic  Right Ear: Ear canal normal       Left Ear: Ear canal normal       Nose: Nose normal  No congestion or rhinorrhea  Mouth/Throat:      Mouth: Mucous membranes are moist       Pharynx: No oropharyngeal exudate or posterior oropharyngeal erythema  Cardiovascular:      Rate and Rhythm: Normal rate and regular rhythm  Heart sounds: Normal heart sounds  No murmur heard  No friction rub  No gallop  Pulmonary:      Effort: Pulmonary effort is normal  No respiratory distress  Breath sounds: Normal breath sounds  No wheezing, rhonchi or rales  Abdominal:      General: Bowel sounds are normal       Palpations: Abdomen is soft  Musculoskeletal:         General: No swelling, tenderness, deformity or signs of injury  Normal range of motion  Cervical back: Normal range of motion and neck supple  Right lower leg: No edema  Left lower leg: No edema  Comments: Examination of the left hip shows; full range of motion    Examination of the skin shows no erythema no tenderness  There is no tenderness over the hip  No swelling no deformity  No evidence of any fluctuation  Sensation is intact  Neurovascular exam intact  Skin:     General: Skin is warm and dry  Capillary Refill: Capillary refill takes less than 2 seconds  Coloration: Skin is not jaundiced or pale  Findings: No bruising, erythema, lesion or rash  Neurological:      Mental Status: She is alert and oriented to person, place, and time     Psychiatric:         Mood and Affect: Mood normal          Behavior: Behavior normal          Vital Signs  ED Triage Vitals   Temp Pulse Respirations Blood Pressure SpO2   -- 12/17/22 0854 12/17/22 0854 12/17/22 0854 12/17/22 0854    84 17 121/78 98 %      Temp src Heart Rate Source Patient Position - Orthostatic VS BP Location FiO2 (%)   -- 12/17/22 0854 12/17/22 1102 12/17/22 0854 --    Monitor Sitting Left arm       Pain Score       12/17/22 0854       6           Vitals:    12/17/22 0854 12/17/22 1102 12/17/22 1252   BP: 121/78 134/59 126/58   Pulse: 84 72 72   Patient Position - Orthostatic VS:  Sitting Lying         Visual Acuity      ED Medications  Medications - No data to display    Diagnostic Studies  Results Reviewed     Procedure Component Value Units Date/Time    Sedimentation rate, automated [[id number]]  (Normal) Collected: 12/17/22 1229    Lab Status: Final result Specimen: Blood from Hand, Right Updated: 12/17/22 1255     Sed Rate 8 mm/hour     Comprehensive metabolic panel [[id number]] Collected: 12/17/22 1030    Lab Status: Final result Specimen: Blood from Hand, Right Updated: 12/17/22 1055     Sodium 138 mmol/L      Potassium 4 1 mmol/L      Chloride 105 mmol/L      CO2 24 mmol/L      ANION GAP 9 mmol/L      BUN 17 mg/dL      Creatinine 0 86 mg/dL      Glucose 91 mg/dL      Calcium 9 1 mg/dL      AST 13 U/L      ALT 16 U/L      Alkaline Phosphatase 74 U/L      Total Protein 6 9 g/dL      Albumin 4 2 g/dL      Total Bilirubin 0 48 mg/dL      eGFR 74 ml/min/1 73sq m     Narrative:      National Kid[employer] Disease Foundation guidelines for Chronic Kidney Disease (CKD):   •  Stage 1 with normal or high GFR (GFR > 90 mL/min/1 73 square meters)  •  Stage 2 Mild CKD (GFR = 60-89 mL/min/1 73 square meters)  •  Stage 3A Moderate CKD (GFR = 45-59 mL/min/1 73 square meters)  •  Stage 3B Moderate CKD (GFR = 30-44 mL/min/1 73 square meters)  •  Stage 4 Severe CKD (GFR = 15-29 mL/min/1 73 square meters)  •  Stage 5 End Stage CKD (GFR <15 mL/min/1 73 square meters)  Note: GFR calculation is accurate only with a steady state creatinine    C-reactive protein [[id number]]  (Normal) Collected: 12/17/22 1030    Lab Status: Final result Specimen: Blood from Hand, Right Updated: 12/17/22 1055     CRP 2 0 mg/L     CBC and differential [[id number]]  (Abnormal) Collected: 12/17/22 0937    Lab Status: Final result Specimen: Blood from Hand, Right Updated: 12/17/22 0949     WBC 11 22 Thousand/uL      RBC 4 63 Million/uL      Hemoglobin 15 0 g/dL      Hematocrit 44 8 %      MCV 97 fL      MCH 32 4 pg      MCHC 33 5 g/dL      RDW 11 9 %      MPV 9 9 fL      Platelets 216 Thousands/uL      nRBC 0 /100 WBCs      Neutrophils Relative 70 %      Immat GRANS % 0 %      Lymphocytes Relative 22 %      Monocytes Relative 6 %      Eosinophils Relative 1 %      Basophils Relative 1 %      Neutrophils Absolute 7 82 Thousands/µL      Immature Grans Absolute 0 05 Thousand/uL      Lymphocytes Absolute 2 51 Thousands/µL      Monocytes Absolute 0 71 Thousand/µL      Eosinophils Absolute 0 06 Thousand/µL      Basophils Absolute 0 07 Thousands/µL                  XR hip/pelv 2-3 vws left   Final Result by Robel Fabian MD (12/17 1007)      No acute osseous abnormality              Workstation performed: [id number]                    Procedures  Procedures         ED Course                               SBIRT 20yo+    Flowsheet Row Most Recent Value   SBIRT (23 yo +)    In order to provide better care to our patients, we are screening all of our patients for alcohol and drug use  Would it be okay to ask you these screening questions? Yes Filed at: 12/17/2022 1232   Initial Alcohol Screen: US AUDIT-C     1  How often do you have a drink containing alcohol? 0 Filed at: 12/17/2022 1232   2  How many drinks containing alcohol do you have on a typical day you are drinking? 0 Filed at: 12/17/2022 1232   3a  Male UNDER 65: How often do you have five or more drinks on one occasion? 0 Filed at: 12/17/2022 1232   3b  FEMALE Any Age, or MALE 65+: How often do you have 4 or more drinks on one occassion? 0 Filed at: 12/17/2022 1232   Audit-C Score 0 Filed at: 12/17/2022 1232   ALEX: How many times in the past year have you    Used an illegal drug or used a prescription medication for non-medical reasons? Never Filed at: 12/17/2022 1232                    MDM  Number of Diagnoses or Management Options  Diagnosis management comments: This is a 59years old came for having left hip pain after patient received intra-articular injection of the left hip  Patient received this injection 2 years ago and 1 year ago  Patient stated that she has at the and one of the ligaments  The injection she received it was 60 days ago  Patient felt better after the injection but since 2 days ago she started to have the pain  Physical exam on the left hip shows full range of motion no evidence of infection on the skin  X-ray of the left hip came back normal   We did ESR 8, CRP 2 WBC is is 11  The case discussed with the patient I told her that there is very unlikely that there is infection after the injection  All the question concerns of the patient has been fully addressed  Patient advised to follow-up with his orthopedist Pt is feeling better and would like to go home  I rechecked the pt and undated on the results of the ED findings, including physical exam,diagnosis, and all abnormal test results   I discussed the plan of discharge with the pt and advised to follow up with PCP or to return to the ED for any new or worsening symptoms  pt understands and agrees with the plan of care  All questions and concerns have been addressed at this time  Amount and/or Complexity of Data Reviewed  Clinical lab tests: ordered and reviewed  Tests in the radiology section of CPT®: ordered and reviewed    Risk of Complications, Morbidity, and/or Mortality  Presenting problems: moderate  Diagnostic procedures: moderate  Management options: low        Disposition  Final diagnoses:   Left hip pain     Time reflects when diagnosis was documented in both MDM as applicable and the Disposition within this note     Time User Action Codes Description Comment    12/17/2022  1:04 PM Jackelyn Vance [[id number]] Left hip pain       ED Disposition     ED Disposition   Discharge    Condition   Stable    Date/Time   Sat Dec 17, 2022  1:04 PM    Comment   Melissa Dickens discharge to home/self care  Follow-up Information     Follow up With Specialties Details Why Contact Info    Calos Rae MD Family Medicine In 1 week  [address]  [address] [address]  [phone]          follow up with your Orthopedist in 4-5 days          Discharge Medication List as of 12/17/2022  1:06 PM      CONTINUE these medications which have NOT CHANGED    Details   Cholecalciferol (Vitamin D) 125 MCG (5000 UT) CAPS Take by mouth, Historical Med      estradiol (ESTRACE) 0 1 mg/g vaginal cream Pea sized amount of cream to vaginal opening QHS three times a week, Historical Med      mesalamine (APRISO) 0 375 g 24 hr capsule Take 2 capsules (0 75 g total) by mouth 2 (two) times a day, Starting Wed 3/30/2022, Until Fri 5/27/2022, Normal      valsartan (DIOVAN) 40 mg tablet Take 1 tablet (40 mg total) by mouth daily, Starting Fri 9/16/2022, Normal             No discharge procedures on file      PDMP Review       Value Time User    PDMP Reviewed Yes 1/21/2022  3:01 PM Jh Faustin MD          ED Provider  Electronically Signed by           Kirill Du MD  12/17/22 [id number]

## 2022-12-19 ENCOUNTER — TELEPHONE (OUTPATIENT)
Dept: OBGYN CLINIC | Facility: HOSPITAL | Age: 57
End: 2022-12-19

## 2022-12-19 ENCOUNTER — TELEPHONE (OUTPATIENT)
Dept: OTHER | Facility: HOSPITAL | Age: 57
End: 2022-12-19

## 2022-12-19 NOTE — TELEPHONE ENCOUNTER
Caller: Melissa  Doctor: Ramakrishna Murphy    Reason for call: Patient requested copy last office visit  Faxed to [phone]  Patient questioned if [facility] could access all of her records   Gave # for medical records and transferred    Call back#: [phone]

## 2022-12-19 NOTE — TELEPHONE ENCOUNTER
Pt presented to ED on Saturday   L hip Films unremarkable  Labs reviewed  Called pt this am with overall improvement L hip pain  Less likely infection at this time  Encouraged pt to call ordering provider who initially order injection for further eval and management  Pt agreeable

## 2023-04-28 ENCOUNTER — OFFICE VISIT (OUTPATIENT)
Dept: FAMILY MEDICINE CLINIC | Facility: CLINIC | Age: 58
End: 2023-04-28

## 2023-04-28 VITALS
OXYGEN SATURATION: 98 % | HEIGHT: 62 IN | TEMPERATURE: 97.1 F | WEIGHT: 178 LBS | BODY MASS INDEX: 32.76 KG/M2 | SYSTOLIC BLOOD PRESSURE: 122 MMHG | DIASTOLIC BLOOD PRESSURE: 92 MMHG | HEART RATE: 78 BPM

## 2023-04-28 DIAGNOSIS — F32.1 MODERATE MAJOR DEPRESSION, SINGLE EPISODE (HCC): Primary | ICD-10-CM

## 2023-04-28 DIAGNOSIS — K51.80 OTHER ULCERATIVE COLITIS WITHOUT COMPLICATION (HCC): ICD-10-CM

## 2023-04-28 RX ORDER — ESCITALOPRAM OXALATE 5 MG/1
5 TABLET ORAL DAILY
Qty: 30 TABLET | Refills: 1 | Status: SHIPPED | OUTPATIENT
Start: 2023-04-28

## 2023-04-28 NOTE — ASSESSMENT & PLAN NOTE
Positive depression screen, has been feeling down and crying a lot  She denies suicidal or homicidal ideation  She was  from her  however he passed away in February and her children are not adjusting well  She does not wish to do counseling but open to medication therapy  Discussed SSRI and side effects, will start on escitalopram 5 mg and advise to f/u for adverse effects  Will re-evaluate in 4 weeks

## 2023-04-28 NOTE — PROGRESS NOTES
Name: Lakisha King      : 1965      MRN: [id number]  Encounter Provider: HANK Pérez  Encounter Date: 2023   Encounter department: [address] [place] [address] MEDICINE    Assessment & Plan     1  Moderate major depression, single episode ([facility] )  Assessment & Plan:  Positive depression screen, has been feeling down and crying a lot  She denies suicidal or homicidal ideation  She was  from her  however he passed away in February and her children are not adjusting well  She does not wish to do counseling but open to medication therapy  Discussed SSRI and side effects, will start on escitalopram 5 mg and advise to f/u for adverse effects  Will re-evaluate in 4 weeks  Orders:  -     escitalopram (LEXAPRO) 5 mg tablet; Take 1 tablet (5 mg total) by mouth daily    2  Other ulcerative colitis without complication ([facility] )  Assessment & Plan:  No longer on medication states symptoms are stable        Depression Screening and Follow-up Plan: Patient's depression screening was positive with a PHQ-2 score of 4  Their PHQ-9 score was 13  Patient assessed for underlying major depression  Brief counseling provided and recommend additional follow-up/re-evaluation next office visit  Tobacco Cessation Counseling: Tobacco cessation counseling was provided  The patient is sincerely urged to quit consumption of tobacco  She is not ready to quit tobacco          Subjective      Has been experiencing depression symptoms  She was  form her  however he suddenly passed away in  [facility] and reports children are having difficulty adjusting  Review of Systems   Constitutional: Negative for chills, fatigue and fever  HENT: Negative for congestion, ear pain and sore throat  Eyes: Negative for pain and visual disturbance  Respiratory: Negative for cough and shortness of breath  Cardiovascular: Negative for chest pain and palpitations     Gastrointestinal: "Negative for abdominal pain, constipation, diarrhea, nausea and vomiting  Genitourinary: Negative for dysuria and hematuria  Musculoskeletal: Negative for arthralgias and back pain  Skin: Negative for color change and rash  Allergic/Immunologic: Negative for environmental allergies  Neurological: Negative for dizziness, seizures and syncope  Psychiatric/Behavioral: Positive for decreased concentration and dysphoric mood  Negative for agitation, behavioral problems, confusion, hallucinations, self-injury, sleep disturbance and suicidal ideas  The patient is not nervous/anxious and is not hyperactive  All other systems reviewed and are negative  Current Outpatient Medications on File Prior to Visit   Medication Sig   • Cholecalciferol (Vitamin D) 125 MCG (5000 UT) CAPS Take by mouth   • estradiol (ESTRACE) 0 1 mg/g vaginal cream Pea sized amount of cream to vaginal opening QHS three times a week   • valsartan (DIOVAN) 40 mg tablet Take 1 tablet (40 mg total) by mouth daily   • mesalamine (APRISO) 0 375 g 24 hr capsule Take 2 capsules (0 75 g total) by mouth 2 (two) times a day (Patient not taking: Reported on 4/28/2023)       Objective     /92 (BP Location: Left arm, Patient Position: Sitting, Cuff Size: Standard)   Pulse 78   Temp (!) 97 1 °F (36 2 °C) (Tympanic)   Ht 5' 2\" (1 575 m)   Wt 80 7 kg (178 lb)   SpO2 98%   BMI 32 56 kg/m²     Physical Exam  Vitals and nursing note reviewed  Constitutional:       General: She is not in acute distress  Appearance: Normal appearance  She is obese  She is not ill-appearing, toxic-appearing or diaphoretic  HENT:      Head: Normocephalic and atraumatic  Right Ear: Tympanic membrane, ear canal and external ear normal  There is no impacted cerumen  Left Ear: Tympanic membrane, ear canal and external ear normal  There is no impacted cerumen  Nose: Nose normal  No congestion or rhinorrhea        Mouth/Throat:      Mouth: Mucous " membranes are moist       Pharynx: No oropharyngeal exudate or posterior oropharyngeal erythema  Eyes:      General:         Right eye: No discharge  Left eye: No discharge  Extraocular Movements: Extraocular movements intact  Conjunctiva/sclera: Conjunctivae normal       Pupils: Pupils are equal, round, and reactive to light  Neck:      Vascular: No carotid bruit  Cardiovascular:      Rate and Rhythm: Normal rate and regular rhythm  Pulses: Normal pulses  Heart sounds: Normal heart sounds  No murmur heard  Pulmonary:      Effort: Pulmonary effort is normal  No respiratory distress  Breath sounds: Normal breath sounds  No wheezing  Chest:      Chest wall: No tenderness  Abdominal:      General: Bowel sounds are normal  There is no distension  Palpations: Abdomen is soft  There is no mass  Tenderness: There is no abdominal tenderness  There is no right CVA tenderness or left CVA tenderness  Musculoskeletal:         General: No swelling or tenderness  Normal range of motion  Cervical back: Normal range of motion  No rigidity or tenderness  Right lower leg: No edema  Left lower leg: No edema  Lymphadenopathy:      Cervical: No cervical adenopathy  Skin:     General: Skin is warm  Capillary Refill: Capillary refill takes less than 2 seconds  Coloration: Skin is not jaundiced  Findings: No bruising, erythema or rash  Neurological:      General: No focal deficit present  Mental Status: She is alert and oriented to person, place, and time  Cranial Nerves: No cranial nerve deficit  Sensory: No sensory deficit  Coordination: Coordination normal    Psychiatric:         Attention and Perception: Attention and perception normal          Mood and Affect: Mood is depressed  Affect is tearful  Speech: Speech normal          Behavior: Behavior normal  Behavior is cooperative  Thought Content:  Thought content normal  Thought content is not paranoid or delusional  Thought content does not include homicidal or suicidal ideation  Thought content does not include homicidal or suicidal plan           Cognition and Memory: Cognition and memory normal          Judgment: Judgment normal        HANK Cornejo

## 2023-05-10 DIAGNOSIS — I10 ESSENTIAL HYPERTENSION: ICD-10-CM

## 2023-05-10 RX ORDER — VALSARTAN 40 MG/1
TABLET ORAL
Qty: 90 TABLET | Refills: 3 | Status: SHIPPED | OUTPATIENT
Start: 2023-05-10

## 2023-05-23 ENCOUNTER — TELEPHONE (OUTPATIENT)
Dept: FAMILY MEDICINE CLINIC | Facility: CLINIC | Age: 58
End: 2023-05-23

## 2023-05-23 DIAGNOSIS — J32.9 SINUSITIS, UNSPECIFIED CHRONICITY, UNSPECIFIED LOCATION: Primary | ICD-10-CM

## 2023-05-23 RX ORDER — AMOXICILLIN 875 MG/1
875 TABLET, COATED ORAL 2 TIMES DAILY
Qty: 20 TABLET | Refills: 0 | Status: SHIPPED | OUTPATIENT
Start: 2023-05-23 | End: 2023-06-02

## 2023-05-23 NOTE — TELEPHONE ENCOUNTER
For 2 days now has a sinus infection  Right sided face pain, pressure swelling, ha, nose blocked, no fever  OTC not helping  HAS an appt here on Friday, but is asking if you would be willing to order medication for her  Is at work    [facility] in [address] [facility]

## 2023-05-26 ENCOUNTER — OFFICE VISIT (OUTPATIENT)
Dept: FAMILY MEDICINE CLINIC | Facility: CLINIC | Age: 58
End: 2023-05-26

## 2023-05-26 ENCOUNTER — TELEPHONE (OUTPATIENT)
Dept: FAMILY MEDICINE CLINIC | Facility: CLINIC | Age: 58
End: 2023-05-26

## 2023-05-26 VITALS
SYSTOLIC BLOOD PRESSURE: 130 MMHG | HEART RATE: 88 BPM | BODY MASS INDEX: 32.57 KG/M2 | TEMPERATURE: 97.4 F | HEIGHT: 62 IN | WEIGHT: 177 LBS | OXYGEN SATURATION: 97 % | DIASTOLIC BLOOD PRESSURE: 76 MMHG | RESPIRATION RATE: 16 BRPM

## 2023-05-26 DIAGNOSIS — E04.2 MULTIPLE THYROID NODULES: ICD-10-CM

## 2023-05-26 DIAGNOSIS — E78.5 DYSLIPIDEMIA: ICD-10-CM

## 2023-05-26 DIAGNOSIS — E04.2 MULTIPLE THYROID NODULES: Primary | ICD-10-CM

## 2023-05-26 DIAGNOSIS — B37.31 VAGINAL CANDIDA: ICD-10-CM

## 2023-05-26 DIAGNOSIS — Z86.39 HISTORY OF THYROID DISORDER: ICD-10-CM

## 2023-05-26 DIAGNOSIS — F32.1 MODERATE MAJOR DEPRESSION, SINGLE EPISODE (HCC): ICD-10-CM

## 2023-05-26 DIAGNOSIS — K51.80 OTHER ULCERATIVE COLITIS WITHOUT COMPLICATION (HCC): ICD-10-CM

## 2023-05-26 DIAGNOSIS — E55.9 VITAMIN D DEFICIENCY: ICD-10-CM

## 2023-05-26 DIAGNOSIS — I10 ESSENTIAL HYPERTENSION: Primary | ICD-10-CM

## 2023-05-26 RX ORDER — FLUCONAZOLE 150 MG/1
150 TABLET ORAL ONCE
Qty: 1 TABLET | Refills: 3 | Status: SHIPPED | OUTPATIENT
Start: 2023-05-26 | End: 2023-05-26

## 2023-05-26 RX ORDER — ESCITALOPRAM OXALATE 5 MG/1
5 TABLET ORAL DAILY
Qty: 30 TABLET | Refills: 6 | Status: SHIPPED | OUTPATIENT
Start: 2023-05-26

## 2023-05-26 NOTE — PROGRESS NOTES
Name: Aleah Little      : 1965      MRN: [id number]  Encounter Provider: Gabino Velasco MD  Encounter Date: 2023   Encounter department: [address] [place] [address]  MEDICINE    Assessment & Plan     1  Essential hypertension  Assessment & Plan:  Well controlled on current therapy continue with current medications and will reassess next visit        2  Multiple thyroid nodules  Assessment & Plan:  Reviewed chart CT neck  nl      3  Moderate major depression, single episode (HCC)  -     escitalopram (LEXAPRO) 5 mg tablet; Take 1 tablet (5 mg total) by mouth daily    4  Other ulcerative colitis without complication ([facility] )  Assessment & Plan:  No recent flare up   Sees GI dr Agarwal Filter      5  Vaginal candida  Assessment & Plan:  fluconazole    Orders:  -     fluconazole (DIFLUCAN) 150 mg tablet; Take 1 tablet (150 mg total) by mouth once for 1 dose    6  Vitamin D deficiency  Assessment & Plan:  Lab ordered last visit       7  Dyslipidemia  Assessment & Plan:  Lab ordered  Pt will now get them done      8  History of thyroid disorder  Assessment & Plan:  TFTs ordered pt will now get them done             Subjective      HPI pt  Here for reevaluation of depression doing w ell on lexapro pt recently lost  and is coping  Now sleeping better eating better   Pt needs diflucan for yeast infection on antibiotic    Review of Systems   Gastrointestinal: Negative for abdominal pain and diarrhea  Genitourinary:        Vaginal itching with antibiotic   Psychiatric/Behavioral: Negative for dysphoric mood, self-injury, sleep disturbance and suicidal ideas  The patient is not nervous/anxious          Current Outpatient Medications on File Prior to Visit   Medication Sig   • amoxicillin (AMOXIL) 875 mg tablet Take 1 tablet (875 mg total) by mouth 2 (two) times a day for 10 days   • Cholecalciferol (Vitamin D) 125 MCG (5000 UT) CAPS Take by mouth   • estradiol (ESTRACE) 0 1 mg/g vaginal cream Pea sized amount "of cream to vaginal opening QHS three times a week   • valsartan (DIOVAN) 40 mg tablet TAKE 1 TABLET(40 MG) BY MOUTH DAILY   • [DISCONTINUED] escitalopram (LEXAPRO) 5 mg tablet Take 1 tablet (5 mg total) by mouth daily   • mesalamine (APRISO) 0 375 g 24 hr capsule Take 2 capsules (0 75 g total) by mouth 2 (two) times a day (Patient not taking: Reported on 4/28/2023)       Objective     /76 (BP Location: Left arm, Patient Position: Sitting, Cuff Size: Standard)   Pulse 88   Temp (!) 97 4 °F (36 3 °C) (Tympanic)   Resp 16   Ht 5' 2\" (1 575 m)   Wt 80 3 kg (177 lb)   SpO2 97%   BMI 32 37 kg/m²     Physical Exam  Constitutional:       General: She is not in acute distress  Appearance: Normal appearance  She is well-developed  She is not ill-appearing  Eyes:      Extraocular Movements: Extraocular movements intact  Neck:      Thyroid: No thyromegaly  Cardiovascular:      Rate and Rhythm: Normal rate  Pulmonary:      Effort: Pulmonary effort is normal  No respiratory distress  Breath sounds: Normal breath sounds  Musculoskeletal:      Cervical back: Normal range of motion  Neurological:      General: No focal deficit present  Mental Status: She is alert and oriented to person, place, and time  Mental status is at baseline     Psychiatric:         Mood and Affect: Mood normal          Behavior: Behavior normal        Lizandro Matthew MD  "

## 2023-07-21 LAB
CHOLEST SERPL-MCNC: 175 MG/DL
CHOLEST/HDLC SERPL: 4.3 (CALC)
HDLC SERPL-MCNC: 41 MG/DL
LDLC SERPL CALC-MCNC: 105 MG/DL (CALC)
NONHDLC SERPL-MCNC: 134 MG/DL (CALC)
T4 FREE SERPL-MCNC: 1 NG/DL (ref 0.8–1.8)
TRIGL SERPL-MCNC: 171 MG/DL
TSH SERPL-ACNC: 2.22 MIU/L (ref 0.4–4.5)

## 2023-09-22 ENCOUNTER — TELEPHONE (OUTPATIENT)
Dept: FAMILY MEDICINE CLINIC | Facility: CLINIC | Age: 58
End: 2023-09-22

## 2023-09-22 ENCOUNTER — OFFICE VISIT (OUTPATIENT)
Dept: FAMILY MEDICINE CLINIC | Facility: CLINIC | Age: 58
End: 2023-09-22
Payer: COMMERCIAL

## 2023-09-22 VITALS
DIASTOLIC BLOOD PRESSURE: 72 MMHG | RESPIRATION RATE: 16 BRPM | WEIGHT: 181 LBS | HEART RATE: 53 BPM | OXYGEN SATURATION: 95 % | BODY MASS INDEX: 33.31 KG/M2 | SYSTOLIC BLOOD PRESSURE: 120 MMHG | TEMPERATURE: 97.2 F | HEIGHT: 62 IN

## 2023-09-22 DIAGNOSIS — Z12.31 BREAST CANCER SCREENING BY MAMMOGRAM: ICD-10-CM

## 2023-09-22 DIAGNOSIS — E78.5 DYSLIPIDEMIA: ICD-10-CM

## 2023-09-22 DIAGNOSIS — Z00.00 ANNUAL PHYSICAL EXAM: ICD-10-CM

## 2023-09-22 DIAGNOSIS — Z23 IMMUNIZATION DUE: ICD-10-CM

## 2023-09-22 DIAGNOSIS — E66.09 CLASS 1 OBESITY DUE TO EXCESS CALORIES WITH SERIOUS COMORBIDITY AND BODY MASS INDEX (BMI) OF 33.0 TO 33.9 IN ADULT: ICD-10-CM

## 2023-09-22 DIAGNOSIS — I10 ESSENTIAL HYPERTENSION: Primary | ICD-10-CM

## 2023-09-22 DIAGNOSIS — H81.10 BENIGN PAROXYSMAL POSITIONAL VERTIGO, UNSPECIFIED LATERALITY: ICD-10-CM

## 2023-09-22 DIAGNOSIS — F32.1 MODERATE MAJOR DEPRESSION, SINGLE EPISODE (HCC): ICD-10-CM

## 2023-09-22 DIAGNOSIS — K51.80 OTHER ULCERATIVE COLITIS WITHOUT COMPLICATION (HCC): ICD-10-CM

## 2023-09-22 DIAGNOSIS — I34.0 MILD MITRAL REGURGITATION: ICD-10-CM

## 2023-09-22 DIAGNOSIS — E04.2 MULTIPLE THYROID NODULES: ICD-10-CM

## 2023-09-22 DIAGNOSIS — I35.0 NONRHEUMATIC AORTIC VALVE STENOSIS: ICD-10-CM

## 2023-09-22 DIAGNOSIS — E55.9 VITAMIN D DEFICIENCY: ICD-10-CM

## 2023-09-22 DIAGNOSIS — F17.210 CIGARETTE SMOKER: ICD-10-CM

## 2023-09-22 DIAGNOSIS — M24.152 DEGENERATIVE TEAR OF ACETABULAR LABRUM OF LEFT HIP: ICD-10-CM

## 2023-09-22 PROCEDURE — 99396 PREV VISIT EST AGE 40-64: CPT | Performed by: FAMILY MEDICINE

## 2023-09-22 PROCEDURE — 90471 IMMUNIZATION ADMIN: CPT | Performed by: FAMILY MEDICINE

## 2023-09-22 PROCEDURE — 90686 IIV4 VACC NO PRSV 0.5 ML IM: CPT | Performed by: FAMILY MEDICINE

## 2023-09-22 PROCEDURE — 99214 OFFICE O/P EST MOD 30 MIN: CPT | Performed by: FAMILY MEDICINE

## 2023-09-22 RX ORDER — BUPROPION HYDROCHLORIDE 150 MG/1
150 TABLET ORAL EVERY MORNING
Qty: 30 TABLET | Refills: 5 | Status: SHIPPED | OUTPATIENT
Start: 2023-09-22 | End: 2024-03-20

## 2023-09-22 NOTE — PROGRESS NOTES
Name: Mynor Stein      : 1965      MRN: [id number]  Encounter Provider: Luis Gant MD  Encounter Date: 2023   Encounter department: [facility] [address] MEDICINE    Assessment & Plan     1. Essential hypertension  Assessment & Plan:  Well controlled on current therapy continue with current medications and will reassess next visit        2. Other ulcerative colitis without complication ([address])  Assessment & Plan:  No recent flare ups      3. Multiple thyroid nodules  Assessment & Plan:  Non e seen on CT       4. Benign paroxysmal positional vertigo, unspecified laterality  Assessment & Plan:  No flare up in yrs       5. Class 1 obesity due to excess calories with serious comorbidity and body mass index (BMI) of 33.0 to 33.9 in adult  Assessment & Plan:  Discussion on diet and exercise guidelines for weight loss and  health reviewed with pt         6. Dyslipidemia  Assessment & Plan:  Pt LDL at goal      7. Annual physical exam  Assessment & Plan:  Patient here for annual physical      Orders:  -     Comprehensive metabolic panel; Future; Expected date: 2023  -     Lipid panel; Future; Expected date: 2023  -     CBC and differential; Future  -     Urinalysis with microscopic    8. Degenerative tear of acetabular labrum of left hip  Assessment & Plan:  Chronic intermittent seeing ortho      9. Vitamin D deficiency  Assessment & Plan:  Check level 4000 units 3 x per week    Orders:  -     Vitamin D 25 hydroxy; Future    10. Cigarette smoker  Assessment & Plan:  Pt did well on wellbutrin in past will prescribe  wellbutrin 150 xl  Qd     Orders:  -     CT lung screening program; Future; Expected date: 2023  -     buPROPion (WELLBUTRIN XL) 150 mg 24 hr tablet; Take 1 tablet (150 mg total) by mouth every morning    11. Breast cancer screening by mammogram  -     Mammo screening bilateral w 3d & cad; Future; Expected date: 2023    12.  Immunization due  -     influenza vaccine, quadrivalent, 0.5 mL, preservative-free, for adult and pediatric patients 6 mos+ (AFLURIA, FLUARIX, FLULAVAL, FLUZONE)    13. Moderate major depression, single episode ([address])  Assessment & Plan:  Generally ok but low motivation still pt was on  wellbutrin for smoking cessation which helped her so prescibiing it may have  Both benefits for the patient     Orders:  -     buPROPion (WELLBUTRIN XL) 150 mg 24 hr tablet; Take 1 tablet (150 mg total) by mouth every morning    14. Nonrheumatic aortic valve stenosis  Assessment & Plan: Will check echo  6/24      15. Mild mitral regurgitation  Assessment & Plan:  Echo 6/24             Subjective      HPI pt here for interval visit and annual physical  Review of Systems   Constitutional: Negative for appetite change, chills, fatigue and fever. Respiratory: Negative for cough, chest tightness and shortness of breath. Cardiovascular: Negative for chest pain, palpitations and leg swelling. Gastrointestinal: Negative for abdominal pain, constipation, diarrhea, nausea and vomiting. Genitourinary: Negative for difficulty urinating and frequency. Musculoskeletal: Negative for arthralgias, back pain, gait problem and neck pain. Skin: Negative for rash. Neurological: Negative for dizziness, weakness, light-headedness, numbness and headaches. Hematological: Does not bruise/bleed easily. Psychiatric/Behavioral: Negative for dysphoric mood and sleep disturbance. The patient is not nervous/anxious.         Current Outpatient Medications on File Prior to Visit   Medication Sig   • escitalopram (LEXAPRO) 5 mg tablet Take 1 tablet (5 mg total) by mouth daily   • estradiol (ESTRACE) 0.1 mg/g vaginal cream Pea sized amount of cream to vaginal opening QHS three times a week   • valsartan (DIOVAN) 40 mg tablet TAKE 1 TABLET(40 MG) BY MOUTH DAILY   • [DISCONTINUED] Cholecalciferol (Vitamin D) 125 MCG (5000 UT) CAPS Take by mouth   • [DISCONTINUED] mesalamine (APRISO) 0.375 g 24 hr capsule Take 2 capsules (0.75 g total) by mouth 2 (two) times a day (Patient not taking: Reported on 4/28/2023)       Objective     /72 (BP Location: Left arm, Patient Position: Sitting, Cuff Size: Standard)   Pulse (!) 53   Temp (!) 97.2 °F (36.2 °C) (Tympanic)   Resp 16   Ht 5' 2" (1.575 m)   Wt 82.1 kg (181 lb)   SpO2 95%   BMI 33.11 kg/m²     Physical Exam  Vitals reviewed. Constitutional:       General: She is not in acute distress. Appearance: Normal appearance. She is well-developed. She is obese. HENT:      Head: Normocephalic. Right Ear: Tympanic membrane, ear canal and external ear normal.      Left Ear: Tympanic membrane, ear canal and external ear normal.      Nose: Nose normal.      Mouth/Throat:      Pharynx: No oropharyngeal exudate. Eyes:      General: Lids are normal.      Extraocular Movements: Extraocular movements intact. Conjunctiva/sclera: Conjunctivae normal.      Pupils: Pupils are equal, round, and reactive to light. Neck:      Thyroid: No thyromegaly. Vascular: No carotid bruit. Cardiovascular:      Rate and Rhythm: Normal rate and regular rhythm. Pulses: Normal pulses. Heart sounds: Murmur (2/6 systolic) heard. No friction rub. Pulmonary:      Effort: Pulmonary effort is normal. No respiratory distress. Breath sounds: Normal breath sounds. No stridor. No wheezing or rales. Chest:   Breasts:     Breasts are symmetrical.      Right: Normal. No swelling, bleeding, inverted nipple, mass, nipple discharge, skin change or tenderness. Left: Normal. No swelling, bleeding, inverted nipple, mass, nipple discharge, skin change or tenderness. Abdominal:      General: Bowel sounds are normal. There is no distension. Palpations: Abdomen is soft. There is no mass. Tenderness: There is no abdominal tenderness. There is no guarding. Hernia: No hernia is present.    Musculoskeletal:         General: Normal range of motion. Cervical back: Full passive range of motion without pain, normal range of motion and neck supple. Lymphadenopathy:      Cervical: No cervical adenopathy. Skin:     General: Skin is warm and dry. Findings: No rash. Comments: Nl appearing moles   Neurological:      General: No focal deficit present. Mental Status: She is alert and oriented to person, place, and time. Mental status is at baseline. Cranial Nerves: No cranial nerve deficit. Sensory: No sensory deficit. Motor: No abnormal muscle tone. Coordination: Coordination normal.      Gait: Gait normal.      Deep Tendon Reflexes: Reflexes normal. Babinski sign absent on the right side. Psychiatric:         Mood and Affect: Mood normal.         Speech: Speech normal.         Behavior: Behavior normal.         Thought Content: Thought content normal.         Judgment: Judgment normal.     BMI Counseling: Body mass index is 33.11 kg/m². The BMI is above normal. Nutrition recommendations include 3-5 servings of fruits/vegetables daily, reducing fast food intake, consuming healthier snacks, decreasing soda and/or juice intake, moderation in carbohydrate intake, increasing intake of lean protein and reducing intake of saturated fat and trans fat. Exercise recommendations include exercising 3-5 times per week and strength training exercises.   Ashley Hollis MD

## 2023-09-22 NOTE — ASSESSMENT & PLAN NOTE
Generally ok but low motivation still pt was on  wellbutrin for smoking cessation which helped her so prescibiing it may have  Both benefits for the patient

## 2023-09-22 NOTE — TELEPHONE ENCOUNTER
Spoke with pt if she receives a bill outside of OV physical today. Additional information was discussed outside of physical a bill will go out.  PT stated OK

## 2023-10-25 ENCOUNTER — OFFICE VISIT (OUTPATIENT)
Dept: FAMILY MEDICINE CLINIC | Facility: CLINIC | Age: 58
End: 2023-10-25
Payer: COMMERCIAL

## 2023-10-25 VITALS
SYSTOLIC BLOOD PRESSURE: 124 MMHG | WEIGHT: 181.4 LBS | HEIGHT: 62 IN | HEART RATE: 81 BPM | OXYGEN SATURATION: 98 % | DIASTOLIC BLOOD PRESSURE: 60 MMHG | BODY MASS INDEX: 33.38 KG/M2 | TEMPERATURE: 97.9 F

## 2023-10-25 DIAGNOSIS — R22.1 LOCALIZED SWELLING, MASS AND LUMP, NECK: Primary | ICD-10-CM

## 2023-10-25 PROCEDURE — 99213 OFFICE O/P EST LOW 20 MIN: CPT

## 2023-10-25 RX ORDER — AMOXICILLIN 875 MG/1
875 TABLET, COATED ORAL 2 TIMES DAILY
Qty: 20 TABLET | Refills: 0 | Status: SHIPPED | OUTPATIENT
Start: 2023-10-25 | End: 2023-11-04

## 2023-10-25 NOTE — PROGRESS NOTES
Name: Briseida Montgomery      : 1965      MRN: [id number]  Encounter Provider: HANK Milan  Encounter Date: 10/25/2023   Encounter department: [address] MEDICINE    Assessment & Plan     1. Localized swelling, mass and lump, neck  Assessment & Plan:  L anterior neck swelling and pain for more than a week not improving. Pt is s/p URI 2 weeks prior completed z-mamadou. Swelling suspicious for enlarged lymph node, denies changes in taste, reports difficulty swallowing and reduced ROM in neck feels swelling and pain has increased. Will obtain imaging and start on Amoxicillin 875 mg bid x 10 days. Continue ibuprofen as needed for pain. Orders:  -     US head neck soft tissue; Future; Expected date: 10/25/2023  -     amoxicillin (AMOXIL) 875 mg tablet; Take 1 tablet (875 mg total) by mouth 2 (two) times a day for 10 days           Subjective      Pt states went to  [facility] two weeks ago with sore throat with exudates and headache. Pt was treated with z-mamadou and reports sore throat has resolved however over the last week has increased swelling in L anterior neck with pain on R lateral movement. Pt reports difficulty closing her jaw when experiences pain and difficulty chewing however has been taking NSAIDs which have been effective. Pt is concerned about feeling lump in L anterior neck with swallowing. On exam tonsils are not enlarged, no tenderness in Temporo mandibular joint or clicking, pt reports aching below the L ear . Review of Systems   Constitutional:  Negative for activity change, appetite change, chills, diaphoresis, fatigue, fever and unexpected weight change. HENT:  Positive for ear pain (L ear). Negative for congestion, dental problem, drooling, ear discharge, facial swelling, hearing loss, postnasal drip, rhinorrhea, sinus pressure, sinus pain, sore throat, tinnitus, trouble swallowing and voice change. Eyes:  Negative for discharge.    Respiratory: Negative for cough, chest tightness and shortness of breath. Cardiovascular:  Negative for chest pain, palpitations and leg swelling. Gastrointestinal:  Negative for abdominal distention, diarrhea, nausea and vomiting. Musculoskeletal:  Positive for neck pain. Negative for arthralgias, back pain, gait problem, joint swelling, myalgias and neck stiffness. Skin:  Negative for color change. Allergic/Immunologic: Negative for environmental allergies. Neurological:  Negative for dizziness, speech difficulty, weakness, light-headedness and headaches. Hematological:  Positive for adenopathy. Psychiatric/Behavioral:  Negative for agitation. Current Outpatient Medications on File Prior to Visit   Medication Sig    buPROPion (WELLBUTRIN XL) 150 mg 24 hr tablet Take 1 tablet (150 mg total) by mouth every morning    escitalopram (LEXAPRO) 5 mg tablet Take 1 tablet (5 mg total) by mouth daily    estradiol (ESTRACE) 0.1 mg/g vaginal cream Pea sized amount of cream to vaginal opening QHS three times a week    valsartan (DIOVAN) 40 mg tablet TAKE 1 TABLET(40 MG) BY MOUTH DAILY       Objective     /60 (BP Location: Right arm, Patient Position: Sitting, Cuff Size: Adult)   Pulse 81   Temp 97.9 °F (36.6 °C) (Tympanic)   Ht 5' 2" (1.575 m)   Wt 82.3 kg (181 lb 6.4 oz)   SpO2 98%   BMI 33.18 kg/m²     Physical Exam  Vitals and nursing note reviewed. Constitutional:       General: She is not in acute distress. Appearance: Normal appearance. She is well-developed and normal weight. She is not ill-appearing or toxic-appearing. HENT:      Head: Normocephalic and atraumatic. Jaw: No trismus, tenderness, swelling, pain on movement or malocclusion. Salivary Glands: Right salivary gland is not diffusely enlarged or tender. Left salivary gland is not diffusely enlarged or tender. Right Ear: Hearing, tympanic membrane, ear canal and external ear normal. No decreased hearing noted.  No laceration, drainage, swelling or tenderness. No middle ear effusion. There is no impacted cerumen. No foreign body. No mastoid tenderness. No PE tube. No hemotympanum. Tympanic membrane is not injected, scarred, perforated, erythematous, retracted or bulging. Tympanic membrane has normal mobility. Left Ear: Hearing, tympanic membrane, ear canal and external ear normal. No decreased hearing noted. No laceration, drainage, swelling or tenderness. No middle ear effusion. There is no impacted cerumen. No foreign body. No mastoid tenderness. No PE tube. No hemotympanum. Tympanic membrane is not injected, scarred, perforated, erythematous, retracted or bulging. Tympanic membrane has normal mobility. Nose: Nose normal. No congestion or rhinorrhea. Mouth/Throat:      Lips: Pink. No lesions. Mouth: Mucous membranes are moist.      Dentition: Normal dentition. Tongue: No lesions. Pharynx: Uvula midline. No pharyngeal swelling, oropharyngeal exudate, posterior oropharyngeal erythema or uvula swelling. Tonsils: No tonsillar exudate or tonsillar abscesses. Eyes:      General: Lids are normal. No visual field deficit. Right eye: No discharge. Left eye: No discharge. Extraocular Movements: Extraocular movements intact. Conjunctiva/sclera: Conjunctivae normal.      Pupils: Pupils are equal, round, and reactive to light. Neck:      Thyroid: No thyroid mass, thyromegaly or thyroid tenderness. Vascular: Normal carotid pulses. No carotid bruit, hepatojugular reflux or JVD. Trachea: Trachea and phonation normal.      Meningeal: Brudzinski's sign and Kernig's sign absent. Cardiovascular:      Rate and Rhythm: Normal rate and regular rhythm. Pulses: Normal pulses. Heart sounds: Normal heart sounds. No murmur heard. Pulmonary:      Effort: Pulmonary effort is normal. No respiratory distress. Breath sounds: Normal breath sounds. No stridor.  No wheezing or rhonchi. Chest:      Chest wall: No tenderness. Abdominal:      General: Bowel sounds are normal. There is no distension. Palpations: Abdomen is soft. There is no mass. Tenderness: There is no abdominal tenderness. There is no right CVA tenderness or left CVA tenderness. Musculoskeletal:         General: No swelling or tenderness. Cervical back: No edema, erythema, signs of trauma, rigidity, torticollis, tenderness or crepitus. Pain with movement and muscular tenderness (L anterior neck) present. No spinous process tenderness. Decreased range of motion. Right lower leg: No edema. Left lower leg: No edema. Lymphadenopathy:      Head:      Right side of head: Submandibular and tonsillar adenopathy present. Cervical: No cervical adenopathy. Skin:     General: Skin is warm. Capillary Refill: Capillary refill takes less than 2 seconds. Coloration: Skin is not jaundiced. Findings: No bruising, erythema or rash. Neurological:      General: No focal deficit present. Mental Status: She is alert and oriented to person, place, and time. Cranial Nerves: Cranial nerves 2-12 are intact. No cranial nerve deficit, dysarthria or facial asymmetry. Sensory: Sensation is intact. No sensory deficit. Motor: Motor function is intact. No weakness. Coordination: Coordination is intact. Romberg sign negative. Coordination normal. Finger-Nose-Finger Test and Heel to Llanos Test normal.   Psychiatric:         Mood and Affect: Mood normal.         Behavior: Behavior normal. Behavior is cooperative. Thought Content:  Thought content normal.       [address]

## 2023-11-01 ENCOUNTER — HOSPITAL ENCOUNTER (OUTPATIENT)
Dept: ULTRASOUND IMAGING | Facility: HOSPITAL | Age: 58
Discharge: HOME | End: 2023-11-01
Payer: COMMERCIAL

## 2023-11-01 DIAGNOSIS — R22.1 LOCALIZED SWELLING, MASS AND LUMP, NECK: ICD-10-CM

## 2023-11-01 PROCEDURE — 76536 US EXAM OF HEAD AND NECK: CPT

## 2023-11-10 ENCOUNTER — TELEPHONE (OUTPATIENT)
Age: 58
End: 2023-11-10

## 2023-11-10 ENCOUNTER — TELEPHONE (OUTPATIENT)
Dept: FAMILY MEDICINE CLINIC | Facility: CLINIC | Age: 58
End: 2023-11-10

## 2023-11-10 NOTE — TELEPHONE ENCOUNTER
FYI: Patient requested results of an urltra sound done on 11/1/23. Patient was told as per provider Abraham Fernandez ultrasound no suspicious findings, lump corresponds with cervical lymph node. Complete abx as prescribed and f/u for persistent symptoms.

## 2023-11-10 NOTE — TELEPHONE ENCOUNTER
----- Message from Abel Yeh, [address] [place] [address] sent at 11/10/2023  8:07 AM EST -----  Ultrasound no suspicious findings, lump corresponds with cervical lymph node. Complete abx as prescribed and f/u for persistent symptoms.

## 2023-11-14 ENCOUNTER — TELEPHONE (OUTPATIENT)
Age: 58
End: 2023-11-14

## 2023-11-14 NOTE — TELEPHONE ENCOUNTER
The patient request a order for a Covid Test.   Please contact the patient when the order is ready

## 2023-11-15 ENCOUNTER — TELEMEDICINE (OUTPATIENT)
Dept: FAMILY MEDICINE CLINIC | Facility: CLINIC | Age: 58
End: 2023-11-15
Payer: COMMERCIAL

## 2023-11-15 VITALS — TEMPERATURE: 99 F | BODY MASS INDEX: 33.13 KG/M2 | HEIGHT: 62 IN | WEIGHT: 180 LBS

## 2023-11-15 DIAGNOSIS — U07.1 COVID-19: Primary | ICD-10-CM

## 2023-11-15 PROCEDURE — 99213 OFFICE O/P EST LOW 20 MIN: CPT | Performed by: FAMILY MEDICINE

## 2023-11-15 NOTE — PROGRESS NOTES
Virtual Regular Visit    Verification of patient location:    Patient is located at Home in the following state in which I hold an active license [place]      Assessment/Plan:    Problem List Items Addressed This Visit        Other    COVID-19 - Primary     Pt does not want paxlovid  otc ibuprofen/tylenol if any SOB chest pain leg pain or swelling or deteriorating status to ER                 Reason for visit is   Chief Complaint   Patient presents with   • COVID-19     Tested positive for covid 11/14  Symptoms started 11/14   • Generalized Body Aches   • Headache   • Cough   • Virtual Regular Visit          Encounter provider Sita Arshad MD    Provider located at [address] [place] [id number]  [phone]      Recent Visits  Date Type Provider Dept   11/10/23 Telephone Sita Arshad MD [facility]   11/10/23 Telephone Dori Parisi    Showing recent visits within past 7 days and meeting all other requirements  Today's Visits  Date Type Provider Dept   11/15/23 Telemedicine Sita Arshad MD [facility] [address] today's visits and meeting all other requirements  Future Appointments  No visits were found meeting these conditions. Showing future appointments within next 150 days and meeting all other requirements       The patient was identified by name and date of birth. Sarita Cleary was informed that this is a telemedicine visit and that the visit is being conducted through the [employer]. She agrees to proceed. .  My office door was closed. No one else was in the room. She acknowledged consent and understanding of privacy and security of the video platform. The patient has agreed to participate and understands they can discontinue the visit at any time. Patient is aware this is a billable service. Didi Garcia is a 59 y.o. female .  Started to feel ill yesterday  tested positive for covid now with  bodyaches headache  cough no sob no chest pain had covid 2 yrs ago  does not want paxlovid       HPI     Past Medical History:   Diagnosis Date   • Colon polyp    • Depression    • GERD (gastroesophageal reflux disease)     19 under control at this time, no meds   • Heart murmur    • HL (hearing loss)    • Hyperlipidemia    • Hypertension    • Inflammatory bowel disease    • Joint pain     left hip labral tear   • Tinnitus    • Ulcerative colitis ([address])     19 no meds at this time   • Vertigo        Past Surgical History:   Procedure Laterality Date   •  SECTION N/A [id number],0234   • COLONOSCOPY  2017   • COLONOSCOPY     • FL INJECTION LEFT HIP (NON ARTHROGRAM)  12/10/2018   • FL INJECTION LEFT HIP (NON ARTHROGRAM)  2020   • FL INJECTION LEFT HIP (NON ARTHROGRAM)  2022   • FL INJECTION LEFT HIP (NON ARTHROGRAM)  2022   • MAMMO (HISTORICAL)  2020   • WISDOM TOOTH EXTRACTION         Current Outpatient Medications   Medication Sig Dispense Refill   • buPROPion (WELLBUTRIN XL) 150 mg 24 hr tablet Take 1 tablet (150 mg total) by mouth every morning 30 tablet 5   • escitalopram (LEXAPRO) 5 mg tablet Take 1 tablet (5 mg total) by mouth daily 30 tablet 6   • estradiol (ESTRACE) 0.1 mg/g vaginal cream Pea sized amount of cream to vaginal opening QHS three times a week     • valsartan (DIOVAN) 40 mg tablet TAKE 1 TABLET(40 MG) BY MOUTH DAILY 90 tablet 3     No current facility-administered medications for this visit. No Known Allergies    Review of Systems   Constitutional:  Positive for fatigue. Negative for appetite change, chills and fever. HENT:  Negative for congestion, rhinorrhea, sinus pain and sore throat. Eyes:  Negative for discharge. Respiratory:  Positive for cough. Negative for shortness of breath and wheezing. Cardiovascular:  Negative for chest pain and palpitations.    Gastrointestinal:  Negative for abdominal pain, diarrhea, nausea and vomiting. Musculoskeletal:  Positive for myalgias. Neurological:  Positive for headaches. Psychiatric/Behavioral:  Negative for dysphoric mood. The patient is not nervous/anxious. Video Exam    Vitals:    11/15/23 1010   Temp: 99 °F (37.2 °C)   TempSrc: Tympanic   Weight: 81.6 kg (180 lb)   Height: 5' 2" (1.575 m)       Physical Exam  Constitutional:       General: She is not in acute distress. Appearance: Normal appearance. She is well-developed. She is not ill-appearing. Eyes:      Extraocular Movements: Extraocular movements intact. Neck:      Thyroid: No thyromegaly. Cardiovascular:      Rate and Rhythm: Normal rate. Pulmonary:      Effort: Pulmonary effort is normal. No respiratory distress. Breath sounds: Normal breath sounds. Musculoskeletal:      Cervical back: Normal range of motion. Neurological:      General: No focal deficit present. Mental Status: She is alert and oriented to person, place, and time. Mental status is at baseline.    Psychiatric:         Mood and Affect: Mood normal.         Behavior: Behavior normal.

## 2023-11-15 NOTE — ASSESSMENT & PLAN NOTE
Pt does not want paxlovid  otc ibuprofen/tylenol if any SOB chest pain leg pain or swelling or deteriorating status to ER

## 2023-11-22 ENCOUNTER — NURSE TRIAGE (OUTPATIENT)
Age: 58
End: 2023-11-22

## 2023-11-22 NOTE — TELEPHONE ENCOUNTER
Patient with positive covid test 11/15, she had a virtual appointment with PCP that same day. Patient was not prescribed anything at that time. Patient states she has a cough and congestion that is not improving. She is taking Mucinex for symptoms at this time. Patient denies fever, denies trouble breathing and CP. Patient wants to know if provider can call anything in for her at this time. Reason for Disposition   [1] Caller has NON-URGENT question AND [2] triager unable to answer    Answer Assessment - Initial Assessment Questions  1. COVID-19 ONSET: "When did the symptoms of COVID-19 first start?"      11/15    2. DIAGNOSIS CONFIRMATION: "How were you diagnosed?" (e.g., COVID-19 oral or nasal viral test; COVID-19 antibody test; doctor visit)      Positive home test    3. MAIN SYMPTOM:  "What is your main concern or symptom right now?" (e.g., breathing difficulty, cough, fatigue. loss of smell)      Cough and congestion    4. SYMPTOM ONSET: "When did the symptoms start?"      11/15    5. BETTER-SAME-WORSE: "Are you getting better, staying the same, or getting worse over the last 1 to 2 weeks?"      Not improving    6. RECENT MEDICAL VISIT: "Have you been seen by a healthcare provider (doctor, NP, PA) for these persisting COVID-19 symptoms?" If Yes, ask: "When were you seen?" (e.g., date)      11/15 she had a virtual appointment with pcp     7. COUGH: "Do you have a cough?" If Yes, ask: "How bad is the cough?"        Yes    8. FEVER: "Do you have a fever?" If Yes, ask: "What is your temperature, how was it measured, and when did it start?"      Denies    9.  BREATHING DIFFICULTY: "Are you having any trouble breathing?" If Yes, ask: "How bad is your breathing?" (e.g., mild, moderate, severe)     - MILD: No SOB at rest, mild SOB with walking, speaks normally in sentences, can lie down, no retractions, pulse < 100.     - MODERATE: SOB at rest, SOB with minimal exertion and prefers to sit, cannot lie down flat, speaks in phrases, mild retractions, audible wheezing, pulse 100-120.     - SEVERE: Very SOB at rest, speaks in single words, struggling to breathe, sitting hunched forward, retractions, pulse > 120        Denies    10.  HIGH RISK DISEASE: "Do you have any chronic medical problems?" (e.g., asthma, heart or lung disease, weak immune system, obesity, etc.)        HTN                Taking mucinex    Protocols used: Coronavirus (COVID-19) Persisting Symptoms Follow-up Call-ADULT-

## 2023-11-22 NOTE — TELEPHONE ENCOUNTER
Patient aware- will treat symptomatically for now and will call if she gets worse after thanksgiving for an appointment

## 2023-11-22 NOTE — TELEPHONE ENCOUNTER
Patient is past the window for paxlovid but patient can schedule office visit for evaluation to see if she would need a different medication/antibiotic.

## 2023-12-08 ENCOUNTER — OFFICE VISIT (OUTPATIENT)
Dept: FAMILY MEDICINE CLINIC | Facility: CLINIC | Age: 58
End: 2023-12-08
Payer: COMMERCIAL

## 2023-12-08 VITALS
SYSTOLIC BLOOD PRESSURE: 110 MMHG | WEIGHT: 177.6 LBS | HEART RATE: 80 BPM | TEMPERATURE: 98.6 F | BODY MASS INDEX: 32.68 KG/M2 | HEIGHT: 62 IN | OXYGEN SATURATION: 96 % | DIASTOLIC BLOOD PRESSURE: 55 MMHG

## 2023-12-08 DIAGNOSIS — M65.342 TRIGGER FINGER, LEFT RING FINGER: Primary | ICD-10-CM

## 2023-12-08 PROCEDURE — 99213 OFFICE O/P EST LOW 20 MIN: CPT

## 2023-12-08 NOTE — ASSESSMENT & PLAN NOTE
Pain and swelling for several months finger locks when hand is at rest mostly at night time. Pt has been wearing splint and taking OTC NSAID without improvement. On exam there is MCP joint swellings and tenderness. Will refer to hand sx for further evaluation.

## 2023-12-08 NOTE — PROGRESS NOTES
Name: Howie Otto      : 1965      MRN: [id number]  Encounter Provider: HANK Live  Encounter Date: 2023   Encounter department: [address] MEDICINE    Assessment & Plan     1. Trigger finger, left ring finger  Assessment & Plan:  Pain and swelling for several months finger locks when hand is at rest mostly at night time. Pt has been wearing splint and taking OTC NSAID without improvement. On exam there is MCP joint swellings and tenderness. Will refer to hand sx for further evaluation. Orders:  -     Ambulatory Referral to Hand Surgery; Future           Subjective      Pt presents with painful L hand ring finger that has worsened over the last several months. Pt states that unable to straighten finger if she does not wear a splint, has swelling in DIP joint and has been taking NSAIDS       Review of Systems   Constitutional:  Negative for fatigue and fever. Musculoskeletal:  Positive for arthralgias and joint swelling. Skin:  Negative for color change and wound. Current Outpatient Medications on File Prior to Visit   Medication Sig    buPROPion (WELLBUTRIN XL) 150 mg 24 hr tablet Take 1 tablet (150 mg total) by mouth every morning    escitalopram (LEXAPRO) 5 mg tablet Take 1 tablet (5 mg total) by mouth daily    estradiol (ESTRACE) 0.1 mg/g vaginal cream Pea sized amount of cream to vaginal opening QHS three times a week    valsartan (DIOVAN) 40 mg tablet TAKE 1 TABLET(40 MG) BY MOUTH DAILY       Objective     /55 (BP Location: Left arm, Patient Position: Sitting, Cuff Size: Adult)   Pulse 80   Temp 98.6 °F (37 °C) (Tympanic)   Ht 5' 2" (1.575 m)   Wt 80.6 kg (177 lb 9.6 oz)   SpO2 96%   BMI 32.48 kg/m²     Physical Exam  Vitals and nursing note reviewed. Constitutional:       General: She is not in acute distress. Appearance: Normal appearance. She is not ill-appearing, toxic-appearing or diaphoretic.    Cardiovascular: Rate and Rhythm: Normal rate and regular rhythm. Pulses: Normal pulses. Heart sounds: Normal heart sounds. Pulmonary:      Effort: Pulmonary effort is normal. No respiratory distress. Breath sounds: Normal breath sounds. Musculoskeletal:         General: Swelling and tenderness present. No deformity or signs of injury. Left hand: Swelling, tenderness (L ring finger) and bony tenderness present. No deformity or lacerations. Decreased range of motion (L ring finger). Normal strength. Normal sensation. There is no disruption of two-point discrimination. Normal capillary refill. Normal pulse. Skin:     General: Skin is warm. Capillary Refill: Capillary refill takes less than 2 seconds. Neurological:      Mental Status: She is alert and oriented to person, place, and time.    Psychiatric:         Mood and Affect: Mood normal.         Behavior: Behavior normal.       [address] [facility] [address]

## 2023-12-24 DIAGNOSIS — F32.1 MODERATE MAJOR DEPRESSION, SINGLE EPISODE (HCC): ICD-10-CM

## 2023-12-26 RX ORDER — ESCITALOPRAM OXALATE 5 MG/1
5 TABLET ORAL DAILY
Qty: 30 TABLET | Refills: 5 | Status: SHIPPED | OUTPATIENT
Start: 2023-12-26

## 2024-01-05 ENCOUNTER — TELEPHONE (OUTPATIENT)
Age: 59
End: 2024-01-05

## 2024-01-05 NOTE — TELEPHONE ENCOUNTER
Pt called due to back pain caused by minor movement. Nothing available. I called the office to see if there was any way to squeeze her in. There was nothing. I helped pt locate the closest u/c and she will go there.

## 2024-01-11 ENCOUNTER — OFFICE VISIT (OUTPATIENT)
Dept: OBGYN CLINIC | Facility: CLINIC | Age: 59
End: 2024-01-11
Payer: COMMERCIAL

## 2024-01-11 VITALS
HEART RATE: 80 BPM | HEIGHT: 62 IN | BODY MASS INDEX: 32.76 KG/M2 | SYSTOLIC BLOOD PRESSURE: 119 MMHG | DIASTOLIC BLOOD PRESSURE: 71 MMHG | WEIGHT: 178 LBS

## 2024-01-11 DIAGNOSIS — M65.342 TRIGGER FINGER, LEFT RING FINGER: Primary | ICD-10-CM

## 2024-01-11 PROCEDURE — 99243 OFF/OP CNSLTJ NEW/EST LOW 30: CPT | Performed by: SURGERY

## 2024-01-11 RX ORDER — PREDNISONE 10 MG/1
TABLET ORAL
COMMUNITY
Start: 2024-01-05

## 2024-01-11 NOTE — PROGRESS NOTES
Sheila Dumont M.D.  Attending, Orthopaedic Surgery  Hand, Wrist, and Elbow Surgery  [facility]      ORTHOPAEDIC HAND, WRIST, AND ELBOW OFFICE  VISIT       ASSESSMENT/PLAN:      57 yo female with left ring trigger finger     Anatomy and physiology of trigger finger was discussed along with treatment options. Initial treatment for this issue typically involves a steroid injection into the affected finger which can be curative in about 70 % of cases. If the finger fails to improve with injections there is a surgical procedure that can be done to resolve the issue. At this time Melissa is at the tail end of a prednisone taper and the finger has improved significantly. We discussed initial injection in this case and decision was made to hold off today given her significant improvement. We will plan to re-evaluate in about 7-10 days to see if an injection is warranted. If she has no issues at that time she may cancel her appointment and follow up as needed.     The patient verbalized understanding of exam findings and treatment plan. We engaged in the shared decision-making process and treatment options were discussed at length with the patient. Surgical and conservative management discussed today along with risks and benefits.    Diagnoses and all orders for this visit:    Trigger finger, left ring finger  -     Ambulatory Referral to Hand Surgery    Other orders  -     predniSONE 10 mg tablet; Last dose on 1/12/24        Follow Up:  Return in about 1 week (around 1/18/2024) for Recheck.    To Do Next Visit:  Re-evaluation of current issue      General Discussions:  Trigger Finger: The anatomy and physiology of trigger finger was discussed with the patient today in the office.  Edema and increased contact pressure within the flexor tendons at the A1 pulley can cause pain, crepitation, and triggering or locking of the digit resulting in limitation of function.  Symptoms can occur at anytime but  are typically worse in the morning or after a brief rest from repetitive activity.  Treatment options include resting/nighttime MP blocking splints to decrease edema, oral anti-inflammatory medications, home or formal therapy exercises, up to 2 steroid injections within the tendon sheath, or surgical release.  While majority of patients do respond to conservative treatment, up to 20% may require surgical release.         ____________________________________________________________________________________________________________________________________________      CHIEF COMPLAINT:  Chief Complaint   Patient presents with    Left Ring Finger - Triggering     Patient has triggering on her left finger       SUBJECTIVE:  Melissa Dickens is a 58 y.o. year old RHD female seen in consultation requested by Florentino MCKINNEY who presents for evaluation of a left ring trigger finger. The finger has been bothering her for about 2 months with progressive worsening over that time. She notes clicking and locking in addition to pain in the digit. She does have to unlock the finger with her other hand at times. Recently she was prescribed a prednisone taper for back issues which subsequently helped her trigger finger. Today she denies pain or locking but does have some clicking which she feels primarily at the DIP.  She has been wearing a splint on the finger to keep it from triggering.       Pain/symptom timing:  Worse during the day when active  Pain/symptom context:  Worse with activites and work  Pain/symptom modifying factors:  Rest makes better, activities make worse  Pain/symptom associated signs/symptoms: none    Prior treatment   NSAIDsYes   Injections No   Bracing/Orthotics Yes    Physical Therapy No     I have personally reviewed all the relevant PMH, PSH, SH, FH, Medications and allergies      PAST MEDICAL HISTORY:  Past Medical History:   Diagnosis Date    Colon polyp     Depression 2022    GERD (gastroesophageal  reflux disease)     19 under control at this time, no meds    Heart murmur     HL (hearing loss)     Hyperlipidemia     Hypertension     Inflammatory bowel disease     Joint pain     left hip labral tear    Tinnitus     Ulcerative colitis (HCC)     19 no meds at this time    Vertigo        PAST SURGICAL HISTORY:  Past Surgical History:   Procedure Laterality Date     SECTION N/A 189,1993    COLONOSCOPY  2017    COLONOSCOPY      FL INJECTION LEFT HIP (NON ARTHROGRAM)  12/10/2018    FL INJECTION LEFT HIP (NON ARTHROGRAM)  2020    FL INJECTION LEFT HIP (NON ARTHROGRAM)  2022    FL INJECTION LEFT HIP (NON ARTHROGRAM)  2022    MAMMO (HISTORICAL)  2020    WISDOM TOOTH EXTRACTION         FAMILY HISTORY:  Family History   Problem Relation Age of Onset    Heart disease Mother     Diabetes Mother     Heart disease Father     Colon cancer Father     Breast cancer Sister     Thyroid disease Brother     Cancer Brother         Thyroid cancer       SOCIAL HISTORY:  Social History     Tobacco Use    Smoking status: Every Day     Current packs/day: 0.75     Average packs/day: 0.8 packs/day for 43.4 years (32.5 ttl pk-yrs)     Types: Cigarettes     Start date: 9/3/1980    Smokeless tobacco: Never    Tobacco comments:     started at age 16 per Yi pt has not smoked the steady timefram of 30 yrs quit  for 3 yeaes at least 2 times   Vaping Use    Vaping status: Never Used   Substance Use Topics    Alcohol use: Not Currently     Comment: occasional    Drug use: Never       MEDICATIONS:    Current Outpatient Medications:     buPROPion (WELLBUTRIN XL) 150 mg 24 hr tablet, Take 1 tablet (150 mg total) by mouth every morning, Disp: 30 tablet, Rfl: 5    escitalopram (LEXAPRO) 5 mg tablet, TAKE 1 TABLET(5 MG) BY MOUTH DAILY, Disp: 30 tablet, Rfl: 5    estradiol (ESTRACE) 0.1 mg/g vaginal cream, Pea sized amount of cream to vaginal opening QHS three times a week, Disp: , Rfl:     predniSONE 10 mg  tablet, Last dose on 1/12/24, Disp: , Rfl:     valsartan (DIOVAN) 40 mg tablet, TAKE 1 TABLET(40 MG) BY MOUTH DAILY, Disp: 90 tablet, Rfl: 3    ALLERGIES:  No Known Allergies        REVIEW OF SYSTEMS:  Review of Systems   Constitutional:  Negative for chills, fatigue and fever.   HENT:  Negative for hearing loss, nosebleeds and sore throat.    Eyes:  Negative for redness and visual disturbance.   Respiratory:  Negative for cough, shortness of breath and wheezing.    Cardiovascular:  Negative for chest pain, palpitations and leg swelling.   Gastrointestinal:  Negative for abdominal pain, nausea and vomiting.   Endocrine: Negative for polydipsia and polyuria.   Genitourinary:  Negative for difficulty urinating, dysuria and hematuria.   Musculoskeletal:  Positive for arthralgias. Negative for joint swelling and myalgias.   Skin:  Negative for rash and wound.   Neurological:  Negative for speech difficulty, weakness, numbness and headaches.   Psychiatric/Behavioral:  Negative for decreased concentration and suicidal ideas. The patient is not nervous/anxious.        VITALS:  Vitals:    01/11/24 0741   BP: 119/71   Pulse: 80       LABS:  HgA1c:   Lab Results   Component Value Date    HGBA1C 5.4 08/27/2022     BMP:   Lab Results   Component Value Date    CALCIUM 9.1 12/17/2022    K 4.1 12/17/2022    CO2 24 12/17/2022     12/17/2022    BUN 17 12/17/2022    CREATININE 0.86 12/17/2022       _____________________________________________________  PHYSICAL EXAMINATION:  General: well developed and well nourished, alert, oriented times 3, and appears comfortable  Psychiatric: Normal  HEENT: Normocephalic, Atraumatic Trachea Midline, No torticollis  Pulmonary: No audible wheezing or respiratory distress   Abdomen/GI: Non tender, non distended   Cardiovascular: No pitting edema, 2+ radial pulse   Skin: No masses, erythema, lacerations, fluctation, ulcerations  Neurovascular: Sensation Intact to the Median, Ulnar, Radial  Nerve, Motor Intact to the Median, Ulnar, Radial Nerve, and Pulses Intact  Musculoskeletal: Normal, except as noted in detailed exam and in HPI.      MUSCULOSKELETAL EXAMINATION:  left ring finger:  Negative palpable nodule over the A1 pulley.  Negative tenderness to palpation over A1 pulley. Negative catching. Positive clicking.        ___________________________________________________  STUDIES REVIEWED:  No new studies to review         PROCEDURES PERFORMED:  Procedures   None today    _____________________________________________________      Scribe Attestation      I,:  Libertad Dalal PA-C am acting as a scribe while in the presence of the attending physician.:       I,:  Sheila Dumont MD personally performed the services described in this documentation    as scribed in my presence.:

## 2024-02-07 ENCOUNTER — OFFICE VISIT (OUTPATIENT)
Dept: OBGYN CLINIC | Facility: CLINIC | Age: 59
End: 2024-02-07
Payer: COMMERCIAL

## 2024-02-07 VITALS
SYSTOLIC BLOOD PRESSURE: 109 MMHG | HEART RATE: 75 BPM | DIASTOLIC BLOOD PRESSURE: 71 MMHG | WEIGHT: 178 LBS | BODY MASS INDEX: 32.56 KG/M2

## 2024-02-07 DIAGNOSIS — M65.342 TRIGGER FINGER, LEFT RING FINGER: Primary | ICD-10-CM

## 2024-02-07 PROCEDURE — 99214 OFFICE O/P EST MOD 30 MIN: CPT | Performed by: SURGERY

## 2024-02-07 PROCEDURE — 20550 NJX 1 TENDON SHEATH/LIGAMENT: CPT | Performed by: SURGERY

## 2024-02-07 RX ORDER — DEXAMETHASONE SODIUM PHOSPHATE 10 MG/ML
40 INJECTION, SOLUTION INTRAMUSCULAR; INTRAVENOUS
Status: COMPLETED | OUTPATIENT
Start: 2024-02-07 | End: 2024-02-07

## 2024-02-07 RX ORDER — ROSUVASTATIN CALCIUM 5 MG/1
5 TABLET, COATED ORAL DAILY
COMMUNITY

## 2024-02-07 RX ORDER — LIDOCAINE HYDROCHLORIDE 10 MG/ML
1 INJECTION, SOLUTION INFILTRATION; PERINEURAL
Status: COMPLETED | OUTPATIENT
Start: 2024-02-07 | End: 2024-02-07

## 2024-02-07 RX ADMIN — LIDOCAINE HYDROCHLORIDE 1 ML: 10 INJECTION, SOLUTION INFILTRATION; PERINEURAL at 17:00

## 2024-02-07 RX ADMIN — DEXAMETHASONE SODIUM PHOSPHATE 40 MG: 10 INJECTION, SOLUTION INTRAMUSCULAR; INTRAVENOUS at 17:00

## 2024-02-07 NOTE — PROGRESS NOTES
Sheila Dumont M.D.  Attending, Orthopaedic Surgery  Hand, Wrist, and Elbow Surgery  [facility]      ORTHOPAEDIC HAND, WRIST, AND ELBOW OFFICE  VISIT       ASSESSMENT/PLAN:      57 yo female with left ring trigger finger     Anatomy and physiology of trigger finger was discussed along with treatment options. Patient did have good improvement with oral steroids but symptoms returned shortly after completing the course. She would like to try an injection today which is reasonable given her improvement with prednisone. If the finger fails to improve with 1-2 injections there is a surgical procedure that can be done to resolve the issue. Risks, benefits, and alternatives to injection were reviewed and this was done in office without complication. We will see her back in 6 weeks for repeat evaluation .     The patient verbalized understanding of exam findings and treatment plan. We engaged in the shared decision-making process and treatment options were discussed at length with the patient. Surgical and conservative management discussed today along with risks and benefits.    1. Trigger finger, left ring finger  - Hand/upper extremity injection         Follow Up:  Return in about 6 weeks (around 3/20/2024).    To Do Next Visit:  Re-evaluation of current issue      ____________________________________________________________________________________________________________________________________________      CHIEF COMPLAINT:  Chief Complaint   Patient presents with    Follow-up     Patient would like a trigger finger injection she was on Steroids before which helped injection is now back        SUBJECTIVE:  Melissa Dickens is a 58 y.o. year old RHD female seen for follow up evaluation of a left ring trigger finger. At last visit patient was just finishing a medrol dose pack and her triggering symptoms were significantly improved. She reports that symptoms returned shortly after completing the  medrol dose pack. Pain is worse with gripping and direct pressure. She does wear a splint at night to keep the finger from locking down           I have personally reviewed all the relevant PMH, PSH, SH, FH, Medications and allergies      PAST MEDICAL HISTORY:  Past Medical History:   Diagnosis Date    Colon polyp     Depression     GERD (gastroesophageal reflux disease)     21 under control at this time, no meds    Heart murmur     HL (hearing loss)     Hyperlipidemia     Hypertension     Inflammatory bowel disease     Joint pain     left hip labral tear    Tinnitus     Ulcerative colitis (HCC)     19 no meds at this time    Vertigo        PAST SURGICAL HISTORY:  Past Surgical History:   Procedure Laterality Date     SECTION N/A 189,1993    COLONOSCOPY  2017    COLONOSCOPY      FL INJECTION LEFT HIP (NON ARTHROGRAM)  12/10/2018    FL INJECTION LEFT HIP (NON ARTHROGRAM)  2020    FL INJECTION LEFT HIP (NON ARTHROGRAM)  2022    FL INJECTION LEFT HIP (NON ARTHROGRAM)  2022    MAMMO (HISTORICAL)  2020    WISDOM TOOTH EXTRACTION         FAMILY HISTORY:  Family History   Problem Relation Age of Onset    Heart disease Mother     Diabetes Mother     Heart disease Father     Colon cancer Father     Breast cancer Sister     Thyroid disease Brother     Cancer Brother         Thyroid cancer       SOCIAL HISTORY:  Social History     Tobacco Use    Smoking status: Every Day     Current packs/day: 0.75     Average packs/day: 0.8 packs/day for 43.4 years (32.6 ttl pk-yrs)     Types: Cigarettes     Start date: 9/3/1980    Smokeless tobacco: Never    Tobacco comments:     started at age 16 per Yi pt has not smoked the steady timefram of 30 yrs quit  for 3 yeaes at least 2 times   Vaping Use    Vaping status: Never Used   Substance Use Topics    Alcohol use: Not Currently     Comment: occasional    Drug use: Never       MEDICATIONS:    Current Outpatient Medications:     buPROPion  (WELLBUTRIN XL) 150 mg 24 hr tablet, Take 1 tablet (150 mg total) by mouth every morning, Disp: 30 tablet, Rfl: 5    escitalopram (LEXAPRO) 5 mg tablet, TAKE 1 TABLET(5 MG) BY MOUTH DAILY, Disp: 30 tablet, Rfl: 5    estradiol (ESTRACE) 0.1 mg/g vaginal cream, Pea sized amount of cream to vaginal opening QHS three times a week, Disp: , Rfl:     rosuvastatin (CRESTOR) 5 mg tablet, Take 5 mg by mouth daily Patient is only taking twice a week, Disp: , Rfl:     valsartan (DIOVAN) 40 mg tablet, TAKE 1 TABLET(40 MG) BY MOUTH DAILY, Disp: 90 tablet, Rfl: 3    predniSONE 10 mg tablet, Last dose on 1/12/24, Disp: , Rfl:     ALLERGIES:  No Known Allergies        REVIEW OF SYSTEMS:  Review of Systems   Constitutional:  Negative for chills, fatigue and fever.   HENT:  Negative for hearing loss, nosebleeds and sore throat.    Eyes:  Negative for redness and visual disturbance.   Respiratory:  Negative for cough, shortness of breath and wheezing.    Cardiovascular:  Negative for chest pain, palpitations and leg swelling.   Gastrointestinal:  Negative for abdominal pain, nausea and vomiting.   Endocrine: Negative for polydipsia and polyuria.   Genitourinary:  Negative for difficulty urinating, dysuria and hematuria.   Musculoskeletal:  Positive for arthralgias. Negative for joint swelling and myalgias.   Skin:  Negative for rash and wound.   Neurological:  Negative for speech difficulty, weakness, numbness and headaches.   Psychiatric/Behavioral:  Negative for decreased concentration and suicidal ideas. The patient is not nervous/anxious.        VITALS:  Vitals:    02/07/24 1706   BP: 109/71   Pulse: 75       LABS:  HgA1c:   Lab Results   Component Value Date    HGBA1C 5.4 08/27/2022     BMP:   Lab Results   Component Value Date    CALCIUM 9.1 12/17/2022    K 4.1 12/17/2022    CO2 24 12/17/2022     12/17/2022    BUN 17 12/17/2022    CREATININE 0.86 12/17/2022  "      _____________________________________________________  PHYSICAL EXAMINATION:  General: well developed and well nourished, alert, oriented times 3, and appears comfortable  Psychiatric: Normal  HEENT: Normocephalic, Atraumatic Trachea Midline, No torticollis  Pulmonary: No audible wheezing or respiratory distress   Abdomen/GI: Non tender, non distended   Cardiovascular: No pitting edema, 2+ radial pulse   Skin: No masses, erythema, lacerations, fluctation, ulcerations  Neurovascular: Sensation Intact to the Median, Ulnar, Radial Nerve, Motor Intact to the Median, Ulnar, Radial Nerve, and Pulses Intact  Musculoskeletal: Normal, except as noted in detailed exam and in HPI.      MUSCULOSKELETAL EXAMINATION:  left ring finger:  Negative palpable nodule over the A1 pulley.  Positive tenderness to palpation over A1 pulley. Negative catching. Positive clicking.        ___________________________________________________  STUDIES REVIEWED:  No new studies to review         PROCEDURES PERFORMED:  Hand/upper extremity injection: L ring A1  Universal Protocol:  Consent: Verbal consent obtained.  Risks and benefits: risks, benefits and alternatives were discussed  Consent given by: patient  Time out: Immediately prior to procedure a \"time out\" was called to verify the correct patient, procedure, equipment, support staff and site/side marked as required.  Patient understanding: patient states understanding of the procedure being performed  Site marked: the operative site was marked  Required items: required blood products, implants, devices, and special equipment available  Patient identity confirmed: verbally with patient  Supporting Documentation  Indications: pain and therapeutic   Procedure Details  Condition:trigger finger Location: ring finger - L ring A1   Preparation: Patient was prepped and draped in the usual sterile fashion  Needle size: 25 G  Ultrasound guidance: no  Approach: volar  Medications administered: 1 " mL lidocaine 1 %; 40 mg dexamethasone 100 mg/10 mL  Patient tolerance: patient tolerated the procedure well with no immediate complications  Dressing:  Sterile dressing applied           _____________________________________________________      Scribe Attestation      I,:  Libertad Dalal PA-C am acting as a scribe while in the presence of the attending physician.:       I,:  Sheila Dumont MD personally performed the services described in this documentation    as scribed in my presence.:

## 2024-02-19 DIAGNOSIS — F32.1 MODERATE MAJOR DEPRESSION, SINGLE EPISODE (HCC): ICD-10-CM

## 2024-02-19 DIAGNOSIS — F17.210 CIGARETTE SMOKER: ICD-10-CM

## 2024-02-19 RX ORDER — BUPROPION HYDROCHLORIDE 150 MG/1
TABLET ORAL
Qty: 30 TABLET | Refills: 5 | Status: SHIPPED | OUTPATIENT
Start: 2024-02-19

## 2024-03-08 ENCOUNTER — OFFICE VISIT (OUTPATIENT)
Dept: FAMILY MEDICINE CLINIC | Facility: CLINIC | Age: 59
End: 2024-03-08
Payer: COMMERCIAL

## 2024-03-08 VITALS
RESPIRATION RATE: 16 BRPM | BODY MASS INDEX: 33.49 KG/M2 | TEMPERATURE: 98.3 F | DIASTOLIC BLOOD PRESSURE: 78 MMHG | WEIGHT: 182 LBS | SYSTOLIC BLOOD PRESSURE: 122 MMHG | HEART RATE: 76 BPM | OXYGEN SATURATION: 97 % | HEIGHT: 62 IN

## 2024-03-08 DIAGNOSIS — I35.0 NONRHEUMATIC AORTIC VALVE STENOSIS: ICD-10-CM

## 2024-03-08 DIAGNOSIS — I34.0 MILD MITRAL REGURGITATION: ICD-10-CM

## 2024-03-08 DIAGNOSIS — R10.12 LEFT UPPER QUADRANT ABDOMINAL PAIN: ICD-10-CM

## 2024-03-08 DIAGNOSIS — I10 ESSENTIAL HYPERTENSION: ICD-10-CM

## 2024-03-08 DIAGNOSIS — K51.80 OTHER ULCERATIVE COLITIS WITHOUT COMPLICATION (HCC): ICD-10-CM

## 2024-03-08 DIAGNOSIS — E78.5 DYSLIPIDEMIA: ICD-10-CM

## 2024-03-08 DIAGNOSIS — E04.2 MULTIPLE THYROID NODULES: ICD-10-CM

## 2024-03-08 DIAGNOSIS — E55.9 VITAMIN D DEFICIENCY: ICD-10-CM

## 2024-03-08 DIAGNOSIS — Z23 ENCOUNTER FOR IMMUNIZATION: Primary | ICD-10-CM

## 2024-03-08 PROBLEM — R01.1 NEWLY RECOGNIZED HEART MURMUR: Status: RESOLVED | Noted: 2022-05-27 | Resolved: 2024-03-08

## 2024-03-08 PROCEDURE — 99214 OFFICE O/P EST MOD 30 MIN: CPT | Performed by: FAMILY MEDICINE

## 2024-03-08 PROCEDURE — 90677 PCV20 VACCINE IM: CPT | Performed by: FAMILY MEDICINE

## 2024-03-08 PROCEDURE — 90471 IMMUNIZATION ADMIN: CPT | Performed by: FAMILY MEDICINE

## 2024-03-08 NOTE — PROGRESS NOTES
Name: Melissa Dickens      : 1965      MRN: [id number]  Encounter Provider: Dori Velasquez MD  Encounter Date: 3/8/2024   Encounter department: [facility] MEDICINE    Assessment & Plan     1. Encounter for immunization  -     Pneumococcal Conjugate Vaccine 20-valent (Pcv20)    2. Essential hypertension  Assessment & Plan:  Well controlled on current therapy continue with current medications and will reassess next visit        3. Nonrheumatic aortic valve stenosis  Assessment & Plan:  Due for repeat echo      4. Mild mitral regurgitation  Assessment & Plan:  Due for echo      5. Dyslipidemia  Assessment & Plan:  Due for labs on meds       6. Multiple thyroid nodules  Assessment & Plan:  Pt had no  nodules on CT  will recheck US thyroid     Orders:  -     US thyroid; Future; Expected date: 2024    7. Other ulcerative colitis without complication (HCC)  Assessment & Plan:  No flare ups       8. Vitamin D deficiency  Assessment & Plan:  Level ordered       9. Left upper quadrant abdominal pain  Assessment & Plan:  Over past 2 weeks constant has had this before     Orders:  -     US thyroid; Future; Expected date: 2024  -     US abdomen complete; Future; Expected date: 2024           Subjective      Pt is here for interval visit and evaluation of multiple medical problems, review of medications, labs, Health Maintenance and any recent specialty consults ortho        Review of Systems   Constitutional:  Negative for appetite change, chills, fatigue and fever.   Respiratory:  Negative for cough, chest tightness and shortness of breath.    Cardiovascular:  Negative for chest pain, palpitations and leg swelling.   Gastrointestinal:  Positive for abdominal pain (LUQ 2 weeks constant  worse after eating). Negative for constipation, diarrhea, nausea and vomiting.   Genitourinary:  Negative for difficulty urinating and frequency.   Musculoskeletal:  Negative for arthralgias,  "back pain, gait problem and neck pain.   Skin:  Negative for rash.   Neurological:  Negative for dizziness, weakness, light-headedness, numbness and headaches.   Hematological:  Does not bruise/bleed easily.   Psychiatric/Behavioral:  Negative for dysphoric mood and sleep disturbance. The patient is not nervous/anxious.        Current Outpatient Medications on File Prior to Visit   Medication Sig   • buPROPion (WELLBUTRIN XL) 150 mg 24 hr tablet TAKE 1 TABLET(150 MG) BY MOUTH EVERY MORNING   • escitalopram (LEXAPRO) 5 mg tablet TAKE 1 TABLET(5 MG) BY MOUTH DAILY   • estradiol (ESTRACE) 0.1 mg/g vaginal cream Pea sized amount of cream to vaginal opening QHS three times a week   • rosuvastatin (CRESTOR) 5 mg tablet Take 5 mg by mouth daily Patient is only taking twice a week   • valsartan (DIOVAN) 40 mg tablet TAKE 1 TABLET(40 MG) BY MOUTH DAILY (Patient taking differently: 1/2 tablet daily)   • [DISCONTINUED] predniSONE 10 mg tablet Last dose on 1/12/24       Objective     /78 (BP Location: Left arm, Patient Position: Sitting, Cuff Size: Standard)   Pulse 76   Temp 98.3 °F (36.8 °C) (Tympanic)   Resp 16   Ht 5' 2\" (1.575 m)   Wt 82.6 kg (182 lb)   SpO2 97%   BMI 33.29 kg/m²     Physical Exam  Vitals reviewed.   Constitutional:       General: She is not in acute distress.     Appearance: Normal appearance. She is well-developed. She is not ill-appearing.   HENT:      Mouth/Throat:      Mouth: Mucous membranes are moist.   Eyes:      Extraocular Movements: Extraocular movements intact.      Conjunctiva/sclera: Conjunctivae normal.      Pupils: Pupils are equal, round, and reactive to light.   Neck:      Thyroid: No thyromegaly.      Vascular: No carotid bruit.   Cardiovascular:      Rate and Rhythm: Normal rate and regular rhythm.      Pulses: Normal pulses.      Heart sounds: Normal heart sounds. No murmur heard.  Pulmonary:      Effort: Pulmonary effort is normal. No respiratory distress.      Breath " sounds: Normal breath sounds.   Chest:      Chest wall: No tenderness.   Abdominal:      General: Bowel sounds are normal. There is no distension.      Palpations: Abdomen is soft.      Tenderness: There is abdominal tenderness (mild LUQ tenderness). There is no right CVA tenderness, left CVA tenderness, guarding or rebound.   Musculoskeletal:      Cervical back: Normal range of motion and neck supple.   Lymphadenopathy:      Cervical: No cervical adenopathy.   Skin:     General: Skin is warm and dry.   Neurological:      General: No focal deficit present.      Mental Status: She is alert and oriented to person, place, and time. Mental status is at baseline.      Cranial Nerves: No cranial nerve deficit.      Deep Tendon Reflexes: Reflexes normal.   Psychiatric:         Mood and Affect: Mood normal.         Behavior: Behavior normal.       Dori Velasquez MD

## 2024-03-10 LAB
25(OH)D3 SERPL-MCNC: 58 NG/ML (ref 30–100)
ALBUMIN SERPL-MCNC: 4.4 G/DL (ref 3.6–5.1)
ALBUMIN/GLOB SERPL: 1.9 (CALC) (ref 1–2.5)
ALP SERPL-CCNC: 83 U/L (ref 37–153)
ALT SERPL-CCNC: 23 U/L (ref 6–29)
APPEARANCE UR: CLEAR
AST SERPL-CCNC: 22 U/L (ref 10–35)
BACTERIA UR QL AUTO: NORMAL /HPF
BASOPHILS # BLD AUTO: 61 CELLS/UL (ref 0–200)
BASOPHILS NFR BLD AUTO: 0.9 %
BILIRUB SERPL-MCNC: 0.5 MG/DL (ref 0.2–1.2)
BILIRUB UR QL STRIP: NEGATIVE
BUN SERPL-MCNC: 10 MG/DL (ref 7–25)
BUN/CREAT SERPL: ABNORMAL (CALC) (ref 6–22)
CALCIUM SERPL-MCNC: 9.4 MG/DL (ref 8.6–10.4)
CHLORIDE SERPL-SCNC: 104 MMOL/L (ref 98–110)
CHOLEST SERPL-MCNC: 200 MG/DL
CHOLEST/HDLC SERPL: 4.1 (CALC)
CO2 SERPL-SCNC: 30 MMOL/L (ref 20–32)
COLOR UR: YELLOW
CREAT SERPL-MCNC: 1.03 MG/DL (ref 0.5–1.05)
EOSINOPHIL # BLD AUTO: 129 CELLS/UL (ref 15–500)
EOSINOPHIL NFR BLD AUTO: 1.9 %
ERYTHROCYTE [DISTWIDTH] IN BLOOD BY AUTOMATED COUNT: 12 % (ref 11–15)
GFR/BSA.PRED SERPLBLD CYS-BASED-ARV: 62 ML/MIN/1.73M2
GLOBULIN SER CALC-MCNC: 2.3 G/DL (CALC) (ref 1.9–3.7)
GLUCOSE SERPL-MCNC: 101 MG/DL (ref 65–99)
GLUCOSE UR QL STRIP: NEGATIVE
HCT VFR BLD AUTO: 40.8 % (ref 35–45)
HDLC SERPL-MCNC: 49 MG/DL
HGB BLD-MCNC: 13.7 G/DL (ref 11.7–15.5)
HGB UR QL STRIP: NEGATIVE
HYALINE CASTS #/AREA URNS LPF: NORMAL /LPF
KETONES UR QL STRIP: NEGATIVE
LDLC SERPL CALC-MCNC: 123 MG/DL (CALC)
LEUKOCYTE ESTERASE UR QL STRIP: NEGATIVE
LYMPHOCYTES # BLD AUTO: 1537 CELLS/UL (ref 850–3900)
LYMPHOCYTES NFR BLD AUTO: 22.6 %
MCH RBC QN AUTO: 32.6 PG (ref 27–33)
MCHC RBC AUTO-ENTMCNC: 33.6 G/DL (ref 32–36)
MCV RBC AUTO: 97.1 FL (ref 80–100)
MONOCYTES # BLD AUTO: 462 CELLS/UL (ref 200–950)
MONOCYTES NFR BLD AUTO: 6.8 %
NEUTROPHILS # BLD AUTO: 4610 CELLS/UL (ref 1500–7800)
NEUTROPHILS NFR BLD AUTO: 67.8 %
NITRITE UR QL STRIP: NEGATIVE
NONHDLC SERPL-MCNC: 151 MG/DL (CALC)
PH UR STRIP: 6 [PH] (ref 5–8)
PLATELET # BLD AUTO: 288 THOUSAND/UL (ref 140–400)
PMV BLD REES-ECKER: 10.4 FL (ref 7.5–12.5)
POTASSIUM SERPL-SCNC: 4.8 MMOL/L (ref 3.5–5.3)
PROT SERPL-MCNC: 6.7 G/DL (ref 6.1–8.1)
PROT UR QL STRIP: NEGATIVE
RBC # BLD AUTO: 4.2 MILLION/UL (ref 3.8–5.1)
RBC #/AREA URNS HPF: NORMAL /HPF
SODIUM SERPL-SCNC: 142 MMOL/L (ref 135–146)
SP GR UR STRIP: 1 (ref 1–1.03)
SQUAMOUS #/AREA URNS HPF: NORMAL /HPF
TRIGL SERPL-MCNC: 162 MG/DL
WBC # BLD AUTO: 6.8 THOUSAND/UL (ref 3.8–10.8)
WBC #/AREA URNS HPF: NORMAL /HPF

## 2024-03-11 ENCOUNTER — TELEPHONE (OUTPATIENT)
Age: 59
End: 2024-03-11

## 2024-03-11 ENCOUNTER — TELEPHONE (OUTPATIENT)
Dept: FAMILY MEDICINE CLINIC | Facility: CLINIC | Age: 59
End: 2024-03-11

## 2024-03-11 DIAGNOSIS — E78.5 DYSLIPIDEMIA: Primary | ICD-10-CM

## 2024-03-11 RX ORDER — PRAVASTATIN SODIUM 20 MG
20 TABLET ORAL DAILY
Qty: 90 TABLET | Refills: 1 | Status: CANCELLED | OUTPATIENT
Start: 2024-03-11

## 2024-03-11 NOTE — TELEPHONE ENCOUNTER
We can try low dose statin pravastatin 20mg daily in addition to zetia 10mg po qd  the 2 together work better than each alone  and it keep the statin dose low

## 2024-03-11 NOTE — TELEPHONE ENCOUNTER
Patient calls in for lab results.  Message given to patient in regards to her cholesterol results. Patient stated the Crestor makes her legs feel weak and muscles sore so she only taking it twice a week.  Patient ask if there is another medication she could try?

## 2024-03-11 NOTE — TELEPHONE ENCOUNTER
----- Message from Dori Velasquez MD sent at 3/11/2024 10:04 AM EDT -----  Chol high needs to take  her crestor daily and recheck  lipids 1 mo

## 2024-03-12 DIAGNOSIS — E78.5 DYSLIPIDEMIA: Primary | ICD-10-CM

## 2024-03-12 RX ORDER — EZETIMIBE 10 MG/1
10 TABLET ORAL DAILY
Qty: 30 TABLET | Refills: 5 | Status: SHIPPED | OUTPATIENT
Start: 2024-03-12 | End: 2024-03-20

## 2024-03-12 RX ORDER — EZETIMIBE 10 MG/1
10 TABLET ORAL DAILY
Qty: 90 TABLET | Refills: 1 | Status: SHIPPED | OUTPATIENT
Start: 2024-03-12

## 2024-03-12 RX ORDER — PRAVASTATIN SODIUM 20 MG
20 TABLET ORAL DAILY
Qty: 30 TABLET | Refills: 5 | Status: SHIPPED | OUTPATIENT
Start: 2024-03-12

## 2024-03-20 ENCOUNTER — OFFICE VISIT (OUTPATIENT)
Dept: OBGYN CLINIC | Facility: CLINIC | Age: 59
End: 2024-03-20
Payer: COMMERCIAL

## 2024-03-20 VITALS
WEIGHT: 180 LBS | DIASTOLIC BLOOD PRESSURE: 71 MMHG | HEIGHT: 62 IN | HEART RATE: 73 BPM | SYSTOLIC BLOOD PRESSURE: 115 MMHG | BODY MASS INDEX: 33.13 KG/M2

## 2024-03-20 DIAGNOSIS — M65.342 TRIGGER FINGER, LEFT RING FINGER: Primary | ICD-10-CM

## 2024-03-20 PROCEDURE — 99214 OFFICE O/P EST MOD 30 MIN: CPT | Performed by: SURGERY

## 2024-03-20 RX ORDER — SODIUM CHLORIDE 9 MG/ML
100 INJECTION, SOLUTION INTRAVENOUS CONTINUOUS
OUTPATIENT
Start: 2024-03-20

## 2024-03-20 RX ORDER — CHLORHEXIDINE GLUCONATE ORAL RINSE 1.2 MG/ML
15 SOLUTION DENTAL ONCE
OUTPATIENT
Start: 2024-03-20 | End: 2024-03-20

## 2024-03-20 NOTE — PROGRESS NOTES
ASSESSMENT/PLAN:      58 year old female here for her left ring trigger finger.  Patient has treated conservatively in the past with cortisone injection that did not give her significant relief of her pain discomfort.  Patient wishes to discuss a left ring trigger finger release.  The anatomy and physiology of trigger finger was discussed with the patient today in the office.  Edema and increased contact pressure within the flexor tendons at the A1 pulley can cause pain, crepitation, and limitation of function.  Treatment options include resting MP blocking splints to decrease edema, oral anti-inflammatory medications, home or formal therapy exercises, up to 2 steroid injections within the tendon sheath, or surgical release.  While majority of patients do respond to conservative treatment, up to 20% may require surgical release.  Patient's hand will be wrapped up for 5 days postoperatively where she needs to cover for showering and cannot lift anything heavy.  After 5 days postop dressings can come off she can wash with warm water, soap and pat dry but still no submerging.  Sutures will come out between 10 and 14 days postoperatively.  Patient shows understanding and agrees with this plan of care.  Will follow-up with her postoperatively.    The patient verbalized understanding of exam findings and treatment plan. We engaged in the shared decision-making process and treatment options were discussed at length with the patient. Surgical and conservative management discussed today along with risks and benefits.    Diagnoses and all orders for this visit:    Trigger finger, left ring finger          Trigger Finger Release: The anatomy and physiology of trigger finger was discussed with the patient today in the office.  Edema and increased contact pressure within the flexor tendons at the A1 pulley can cause pain, crepitation, and limitation of function.  Treatment options include resting MP blocking splints to decrease  edema, oral anti-inflammatory medications, home or formal therapy exercises, up to 2 steroid injections or surgical release.  While majority of patients do respond to conservative treatment, up to 20% may require surgical release.  The patient has elected release of the trigger finger.  The patient has elected to undergo a release of the A1 pulley (trigger finger).  A small incision will be made over the palmar aspect of the hand, the tendon sheath holding the flexor tendons will be released.  In the postoperative period, light activities are allowed immediately, driving is allowed when narcotic medication has stopped, and the incision may get wet after 2 days.  Heavy activities (lifting more than approximately 10 pounds) will be allowed after the follow up appointment in 1-2 weeks.  While the pain and discomfort within the wrist typically improves rapidly, some residual discomfort may be present for up to 6 weeks.  The nodule that is typically palpable in the palmar aspect of the hand will not be removed, as this would necessitate removal of a portion of the flexor tendon, however the catching, clicking, and locking should resolve.  Approximate success rate is 98%.The risks and benefits of the procedure were explained to the patient, which include, but are not limited to: Bleeding, infection, recurrence, pain, scar, damage to tendons, damage to nerves, and damage to blood vessels, need for future surgery and complications related to anesthesia.  If bony work is done, risks also include malunion and nonunion.  These risks, along with alternative conservative treatment options, and postoperative protocols were voiced back and understood by the patient.  All questions were answered to the patient's satisfaction.  The patient agrees to comply with a standard postoperative protocol, and is willing to proceed.  Education was provided via written and auditory forms.  There were no barriers to learning. Written handouts  regarding wound care, incision and scar care, and general preoperative information, as well as risks and benefits were provided to the patient.    Follow Up:  Return for Follow up post- op.      To Do Next Visit:  Re-evaluation of current issue    ____________________________________________________________________________________________________________________________________________      CHIEF COMPLAINT:  Chief Complaint   Patient presents with    Left Ring Finger - Triggering     Patient would like TFR        SUBJECTIVE:  Melissa Dickens is a 58 y.o. year old RHD female who presents today for her symptomatic left ring trigger finger. She has treated conservatively with cortisone injections, last being 2024. She reports that the finger continues to get stuck and is painful. She has reduced ROM since it gts stuck.        I have personally reviewed all the relevant PMH, PSH, SH, FH, Medications and allergies.     PAST MEDICAL HISTORY:  Past Medical History:   Diagnosis Date    Colon polyp     Depression     GERD (gastroesophageal reflux disease)     19 under control at this time, no meds    Heart murmur     HL (hearing loss)     Hyperlipidemia     Hypertension     Inflammatory bowel disease     Joint pain     left hip labral tear    Tinnitus     Ulcerative colitis (HCC)     19 no meds at this time    Vertigo        PAST SURGICAL HISTORY:  Past Surgical History:   Procedure Laterality Date     SECTION N/A 189,1993    COLONOSCOPY  2017    COLONOSCOPY      FL INJECTION LEFT HIP (NON ARTHROGRAM)  12/10/2018    FL INJECTION LEFT HIP (NON ARTHROGRAM)  2020    FL INJECTION LEFT HIP (NON ARTHROGRAM)  2022    FL INJECTION LEFT HIP (NON ARTHROGRAM)  2022    MAMMO (HISTORICAL)  2020    WISDOM TOOTH EXTRACTION         FAMILY HISTORY:  Family History   Problem Relation Age of Onset    Heart disease Mother     Diabetes Mother     Heart disease Father     Colon cancer Father      Breast cancer Sister     Thyroid disease Brother     Cancer Brother         Thyroid cancer       SOCIAL HISTORY:  Social History     Tobacco Use    Smoking status: Every Day     Current packs/day: 0.75     Average packs/day: 0.8 packs/day for 43.5 years (32.7 ttl pk-yrs)     Types: Cigarettes     Start date: 9/3/1980    Smokeless tobacco: Never    Tobacco comments:     started at age 16 per Yi pt has not smoked the steady timefram of 30 yrs quit  for 3 yeaes at least 2 times   Vaping Use    Vaping status: Never Used   Substance Use Topics    Alcohol use: Not Currently     Comment: occasional    Drug use: Never       MEDICATIONS:    Current Outpatient Medications:     buPROPion (WELLBUTRIN XL) 150 mg 24 hr tablet, TAKE 1 TABLET(150 MG) BY MOUTH EVERY MORNING, Disp: 30 tablet, Rfl: 5    escitalopram (LEXAPRO) 5 mg tablet, TAKE 1 TABLET(5 MG) BY MOUTH DAILY, Disp: 30 tablet, Rfl: 5    estradiol (ESTRACE) 0.1 mg/g vaginal cream, Pea sized amount of cream to vaginal opening QHS three times a week, Disp: , Rfl:     ezetimibe (ZETIA) 10 mg tablet, Take 1 tablet (10 mg total) by mouth daily, Disp: 90 tablet, Rfl: 1    pravastatin (PRAVACHOL) 20 mg tablet, Take 1 tablet (20 mg total) by mouth daily, Disp: 30 tablet, Rfl: 5    valsartan (DIOVAN) 40 mg tablet, TAKE 1 TABLET(40 MG) BY MOUTH DAILY (Patient taking differently: 1/2 tablet daily), Disp: 90 tablet, Rfl: 3    ALLERGIES:  No Known Allergies    REVIEW OF SYSTEMS:  Review of Systems    VITALS:  Vitals:    03/20/24 0755   BP: 115/71   Pulse: 73         _____________________________________________________  PHYSICAL EXAMINATION:  General: well developed and well nourished, alert, oriented times 3, and appears comfortable  Psychiatric: Normal  HEENT: Normocephalic, Atraumatic Trachea Midline, No torticollis  Pulmonary: No audible wheezing or respiratory distress   Cardiovascular: No pitting edema, 2+ radial pulse   Abdominal/GI: abdomen non tender, non distended    Skin: No masses, erythema, lacerations, fluctation, ulcerations  Neurovascular: Sensation Intact to the Median, Ulnar, Radial Nerve, Motor Intact to the Median, Ulnar, Radial Nerve, and Pulses Intact  Musculoskeletal: Normal, except as noted in detailed exam and in HPI.      MUSCULOSKELETAL EXAMINATION:    left ring finger:  Negative palpable nodule over the A1 pulley.  Positive tenderness to palpation over A1 pulley. Negative catching. Positive clicking.     ___________________________________________________    STUDIES REVIEWED:  No studies were needed to evaluate this patient's ailment     LABS REVIEWED:    HgA1c:   Lab Results   Component Value Date    HGBA1C 5.4 08/27/2022     BMP:   Lab Results   Component Value Date    CALCIUM 9.4 03/09/2024    K 4.8 03/09/2024    CO2 30 03/09/2024     03/09/2024    BUN 10 03/09/2024    CREATININE 1.03 03/09/2024             PROCEDURES PERFORMED:  Procedures  No Procedures performed today    _____________________________________________________      Scribe Attestation      I,:  Nya Collins am acting as a scribe while in the presence of the attending physician.:       I,:  Sheila Dumont MD personally performed the services described in this documentation    as scribed in my presence.:

## 2024-03-22 ENCOUNTER — HOSPITAL ENCOUNTER (OUTPATIENT)
Dept: ULTRASOUND IMAGING | Facility: HOSPITAL | Age: 59
End: 2024-03-22
Attending: FAMILY MEDICINE
Payer: COMMERCIAL

## 2024-03-22 DIAGNOSIS — E04.2 MULTIPLE THYROID NODULES: ICD-10-CM

## 2024-03-22 DIAGNOSIS — R10.12 LEFT UPPER QUADRANT ABDOMINAL PAIN: ICD-10-CM

## 2024-03-22 PROCEDURE — 76536 US EXAM OF HEAD AND NECK: CPT

## 2024-03-22 PROCEDURE — 76700 US EXAM ABDOM COMPLETE: CPT

## 2024-04-03 ENCOUNTER — TELEPHONE (OUTPATIENT)
Dept: FAMILY MEDICINE CLINIC | Facility: CLINIC | Age: 59
End: 2024-04-03

## 2024-04-03 DIAGNOSIS — E78.5 DYSLIPIDEMIA: ICD-10-CM

## 2024-04-03 DIAGNOSIS — F32.1 MODERATE MAJOR DEPRESSION, SINGLE EPISODE (HCC): ICD-10-CM

## 2024-04-03 DIAGNOSIS — K82.4 GALLBLADDER POLYP: Primary | ICD-10-CM

## 2024-04-03 DIAGNOSIS — F17.210 CIGARETTE SMOKER: ICD-10-CM

## 2024-04-03 RX ORDER — ESCITALOPRAM OXALATE 5 MG/1
5 TABLET ORAL DAILY
Qty: 90 TABLET | Refills: 1 | Status: SHIPPED | OUTPATIENT
Start: 2024-04-03

## 2024-04-03 RX ORDER — PRAVASTATIN SODIUM 20 MG
20 TABLET ORAL DAILY
Qty: 90 TABLET | Refills: 1 | Status: SHIPPED | OUTPATIENT
Start: 2024-04-03

## 2024-04-03 RX ORDER — BUPROPION HYDROCHLORIDE 150 MG/1
150 TABLET ORAL EVERY MORNING
Qty: 90 TABLET | Refills: 1 | Status: SHIPPED | OUTPATIENT
Start: 2024-04-03

## 2024-04-03 RX ORDER — EZETIMIBE 10 MG/1
10 TABLET ORAL DAILY
Qty: 90 TABLET | Refills: 1 | Status: SHIPPED | OUTPATIENT
Start: 2024-04-03

## 2024-04-08 ENCOUNTER — CONSULT (OUTPATIENT)
Dept: SURGERY | Facility: CLINIC | Age: 59
End: 2024-04-08
Payer: COMMERCIAL

## 2024-04-08 VITALS
SYSTOLIC BLOOD PRESSURE: 124 MMHG | WEIGHT: 177.6 LBS | BODY MASS INDEX: 32.68 KG/M2 | HEIGHT: 62 IN | HEART RATE: 88 BPM | OXYGEN SATURATION: 97 % | TEMPERATURE: 96.5 F | DIASTOLIC BLOOD PRESSURE: 86 MMHG

## 2024-04-08 DIAGNOSIS — K82.4 GALLBLADDER POLYP: Primary | ICD-10-CM

## 2024-04-08 DIAGNOSIS — K51.90 ULCERATIVE COLITIS (HCC): ICD-10-CM

## 2024-04-08 PROCEDURE — 99244 OFF/OP CNSLTJ NEW/EST MOD 40: CPT | Performed by: SURGERY

## 2024-04-08 NOTE — PROGRESS NOTES
Assessment/Plan: I reviewed her labs done about a month ago.  Her LFTs were normal.  I also reviewed the ultrasound which shows 10 mm polyp at the neck of the gallbladder.  Her symptoms are suggestive of biliary pathology.  The polyp is large enough that she wants surgery for it.  I advised robotic cholecystectomy.  I explained her the procedure of robotic cholecystectomy.  She verbalized understanding.  We discussed possible complication including but not limited to infection, bleeding, bile leak, CBD injury, conversion to open surgery.  She signed the consent.  She will be scheduled for 2024.  She will undergo PAT.    No problem-specific Assessment & Plan notes found for this encounter.       Diagnoses and all orders for this visit:    Gallbladder polyp  -     Ambulatory Referral to General Surgery    Ulcerative colitis (HCC)          Subjective:      Patient ID: Melissa Dickens is a 58 y.o. female.    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  The pain usually is postprandial.  No nausea or vomiting.  It has been bothering her for some time.  Patient suffers from ulcerative colitis which is under control right now.  Last colonoscopy was in .  She says that she does not take anything regularly for ulcerative colitis.  No blood or mucus in stools.  She does complain of occasional fatty stools.  No jaundice.  Patient has history of  section in the past.  Patient also has been undergoing surveillance for a thyroid nodule which is stable on last ultrasound.  She does not take any anticoagulation.  She is not allergic to any medications.        The following portions of the patient's history were reviewed and updated as appropriate: She  has a past medical history of Colon polyp, Depression (), GERD (gastroesophageal reflux disease), Heart murmur (), HL (hearing loss), Hyperlipidemia, Hypertension, Inflammatory  bowel disease, Joint pain, Tinnitus, Ulcerative colitis (HCC), and Vertigo.  She   Patient Active Problem List    Diagnosis Date Noted    Trigger finger, left ring finger 2023    COVID-19 11/15/2023    Localized swelling, mass and lump, neck 10/25/2023    Nonrheumatic aortic valve stenosis 2023    Mild mitral regurgitation 2023    Vaginal candida 2023    Moderate major depression, single episode (HCC) 2023    Multiple thyroid nodules 2022    Vitamin D deficiency 2022    Left upper quadrant abdominal pain 2022    Essential hypertension 2021    Family history of diabetes mellitus (DM) 2021    Cigarette smoker 2021    Candida infection of flexural skin 2021    Annual physical exam 2021    Class 1 obesity due to excess calories with serious comorbidity and body mass index (BMI) of 33.0 to 33.9 in adult 2021    Dyslipidemia 2020    History of thyroid disorder 2020    Trochanteric bursitis of left hip 08/10/2018    Degenerative tear of acetabular labrum of left hip 10/25/2016    Other ulcerative colitis without complication (HCC) 2016    Right patellofemoral syndrome 2016    Abnormal MRI of head 10/09/2015    Benign paroxysmal positional vertigo 10/09/2015     She  has a past surgical history that includes  section (N/A, 189,1993); FL injection left hip (non arthrogram) (12/10/2018); Colonoscopy (2017); Mammo (historical) (2020); FL injection left hip (non arthrogram) (2020); Colonoscopy; Marlin tooth extraction; FL injection left hip (non arthrogram) (2022); and FL injection left hip (non arthrogram) (2022).  Her family history includes Breast cancer in her sister; Cancer in her brother; Colon cancer in her father; Diabetes in her mother; Heart disease in her father and mother; Thyroid disease in her brother.  She  reports that she has been smoking cigarettes. She started smoking about  43 years ago. She has a 32.7 pack-year smoking history. She has never used smokeless tobacco. She reports that she does not currently use alcohol. She reports that she does not use drugs.  Current Outpatient Medications   Medication Sig Dispense Refill    buPROPion (WELLBUTRIN XL) 150 mg 24 hr tablet Take 1 tablet (150 mg total) by mouth every morning 90 tablet 1    escitalopram (LEXAPRO) 5 mg tablet Take 1 tablet (5 mg total) by mouth daily 90 tablet 1    estradiol (ESTRACE) 0.1 mg/g vaginal cream Pea sized amount of cream to vaginal opening QHS three times a week      ezetimibe (ZETIA) 10 mg tablet Take 1 tablet (10 mg total) by mouth daily 90 tablet 1    pravastatin (PRAVACHOL) 20 mg tablet Take 1 tablet (20 mg total) by mouth daily 90 tablet 1    valsartan (DIOVAN) 40 mg tablet TAKE 1 TABLET(40 MG) BY MOUTH DAILY (Patient not taking: Reported on 4/8/2024) 90 tablet 3     No current facility-administered medications for this visit.     Current Outpatient Medications on File Prior to Visit   Medication Sig    buPROPion (WELLBUTRIN XL) 150 mg 24 hr tablet Take 1 tablet (150 mg total) by mouth every morning    escitalopram (LEXAPRO) 5 mg tablet Take 1 tablet (5 mg total) by mouth daily    estradiol (ESTRACE) 0.1 mg/g vaginal cream Pea sized amount of cream to vaginal opening QHS three times a week    ezetimibe (ZETIA) 10 mg tablet Take 1 tablet (10 mg total) by mouth daily    pravastatin (PRAVACHOL) 20 mg tablet Take 1 tablet (20 mg total) by mouth daily    valsartan (DIOVAN) 40 mg tablet TAKE 1 TABLET(40 MG) BY MOUTH DAILY (Patient not taking: Reported on 4/8/2024)     No current facility-administered medications on file prior to visit.     She has No Known Allergies..    Review of Systems   Constitutional: Negative.    HENT: Negative.     Eyes: Negative.    Respiratory: Negative.     Cardiovascular: Negative.    Gastrointestinal:  Positive for abdominal pain. Negative for blood in stool, diarrhea, nausea and  "vomiting.   Endocrine: Negative.    Genitourinary: Negative.    Musculoskeletal: Negative.    Skin: Negative.    Allergic/Immunologic: Negative.    Neurological: Negative.    Hematological: Negative.    Psychiatric/Behavioral: Negative.           Objective:      /86 (BP Location: Right arm, Patient Position: Sitting, Cuff Size: Standard)   Pulse 88   Temp (!) 96.5 °F (35.8 °C) (Tympanic)   Ht 5' 2\" (1.575 m)   Wt 80.6 kg (177 lb 9.6 oz)   SpO2 97%   BMI 32.48 kg/m²          Physical Exam  Vitals reviewed.   Constitutional:       Appearance: She is obese.   HENT:      Head: Normocephalic.      Nose: Nose normal.   Eyes:      General: No scleral icterus.  Cardiovascular:      Rate and Rhythm: Normal rate and regular rhythm.      Pulses: Normal pulses.      Heart sounds: Normal heart sounds.   Pulmonary:      Effort: Pulmonary effort is normal.      Breath sounds: Normal breath sounds.   Abdominal:      General: Bowel sounds are normal. There is no distension.      Palpations: Abdomen is soft. There is no mass.      Tenderness: There is no abdominal tenderness. There is no guarding or rebound.      Hernia: No hernia is present.   Musculoskeletal:         General: Normal range of motion.      Cervical back: Normal range of motion.   Skin:     General: Skin is warm.   Neurological:      General: No focal deficit present.      Mental Status: She is alert and oriented to person, place, and time.   Psychiatric:         Mood and Affect: Mood normal.           "

## 2024-04-11 NOTE — DISCHARGE INSTR - AVS FIRST PAGE
Post Operative Instructions    You have had surgery on your arm today, please read and follow the information below:  Elevate your hand above your elbow during the next 24-48 hours to help with swelling.  Place your hand and arm over your head with motion at your shoulder three times a day.  Do not apply any cream/ointment/oil to your incisions including antibiotics.  Do not soak your hands in standing water (dishwater, tubs, Jacuzzi's, pools, etc.) until given permission (typically 2-3 weeks after injury)    Call the office if you notice any:  Increased numbness or tingling of your hand or fingers that is not relieved with elevation.  Increasing pain that is not controlled with medication.  Difficulty chewing, breathing, swallowing.  Chest pains or shortness of breath.  Fever over 101.4 degrees.    Bandage: Please keep bandages clean and dry. Remove bandage after 5 days. Once bandages are removed you may wash hands with soap and water. Short showers are okay as well, but please avoid soaking the hand as described above (ie no pools, baths, dirty dish water, hot tubs, ocean/lake water, etc). Sutures will be removed in the office at your first follow up visit, please do not remove them yourself.    Please do NOT put any topical agents on the surgical wound including neosporin, peroxide, tea tree oil, vitamin E, etc. as these can delay wound healing.    Motion: Move fingers into a fist 5 times a day, DO NOT move any splinted fingers.    Weight bearing status: Avoid heavy lifting (>5 pounds) with the extremity that was operated on until follow up appointment. Normal activities of daily living are OK.    Ice: Ice for 10 minutes every hour as needed for swelling x 24 hours.    Sling: No sling necessary.    Pain medication:   Naproxen 220 mg two times a day (this is over the counter!)  *do not take this medication if you were told by your doctor that you cannot take anti-inflammatories or NSAIDS  Tylenol Extended Release  650 mg every 8 hours (this is over the counter!)   Norco/Hydrocodone one tab every 6 hours ONLY AS NEEDED for severe pain        Follow-up Appointment: 10-14 days with Dr Dumont        Please call the office if you have any questions or concerns regarding your post-operative care.

## 2024-04-12 ENCOUNTER — HOSPITAL ENCOUNTER (OUTPATIENT)
Age: 59
Setting detail: OUTPATIENT SURGERY
Discharge: HOME | End: 2024-04-12
Attending: SURGERY | Admitting: SURGERY
Payer: COMMERCIAL

## 2024-04-12 VITALS
DIASTOLIC BLOOD PRESSURE: 66 MMHG | BODY MASS INDEX: 32.57 KG/M2 | SYSTOLIC BLOOD PRESSURE: 143 MMHG | OXYGEN SATURATION: 97 % | HEIGHT: 62 IN | WEIGHT: 177 LBS | HEART RATE: 87 BPM | RESPIRATION RATE: 20 BRPM | TEMPERATURE: 98.1 F

## 2024-04-12 DIAGNOSIS — Z47.89 AFTERCARE FOLLOWING SURGERY OF THE MUSCULOSKELETAL SYSTEM: ICD-10-CM

## 2024-04-12 DIAGNOSIS — M65.342 TRIGGER FINGER, LEFT RING FINGER: Primary | ICD-10-CM

## 2024-04-12 PROCEDURE — 26055 INCISE FINGER TENDON SHEATH: CPT | Performed by: SURGERY

## 2024-04-12 PROCEDURE — 26055 INCISE FINGER TENDON SHEATH: CPT | Performed by: PHYSICIAN ASSISTANT

## 2024-04-12 RX ORDER — HYDROCODONE BITARTRATE AND ACETAMINOPHEN 5; 325 MG/1; MG/1
1 TABLET ORAL EVERY 6 HOURS PRN
Qty: 5 TABLET | Refills: 0 | Status: SHIPPED | OUTPATIENT
Start: 2024-04-12 | End: 2024-04-22

## 2024-04-12 RX ORDER — SODIUM CHLORIDE 9 MG/ML
100 INJECTION, SOLUTION INTRAVENOUS CONTINUOUS
Status: DISCONTINUED | OUTPATIENT
Start: 2024-04-12 | End: 2024-04-12 | Stop reason: HOSPADM

## 2024-04-12 RX ORDER — CHLORHEXIDINE GLUCONATE ORAL RINSE 1.2 MG/ML
15 SOLUTION DENTAL ONCE
Status: COMPLETED | OUTPATIENT
Start: 2024-04-12 | End: 2024-04-12

## 2024-04-12 RX ORDER — HYDROCODONE BITARTRATE AND ACETAMINOPHEN 5; 325 MG/1; MG/1
1 TABLET ORAL EVERY 6 HOURS PRN
Status: DISCONTINUED | OUTPATIENT
Start: 2024-04-12 | End: 2024-04-12 | Stop reason: HOSPADM

## 2024-04-12 RX ADMIN — CHLORHEXIDINE GLUCONATE 0.12% ORAL RINSE 15 ML: 1.2 LIQUID ORAL at 06:43

## 2024-04-12 NOTE — OP NOTE
OPERATIVE REPORT  PATIENT NAME: Melissa Dickens  :  1965  MRN: [id number]  Pt Location: [facility]    SURGERY DATE: 24    Surgeons and Role:     * Sheila Dumont MD - Primary     * Libertad Dalal PA-C - Assisting    Pre-Op Diagnosis:  Trigger finger, left ring finger [M65.342]    Post-Op Diagnosis Codes:     * Trigger finger, left ring finger [M65.342]    Procedure(s):  RELEASE TRIGGER RING FINGER (Left)    Specimen(s):  No specimens collected during this procedure.    Estimated Blood Loss:   Minimal    Anesthesia Type:   Local    IMPLANTS:  * No implants in log *    PERIOPERATIVE ANTIBIOTICS:    none    Tourniquet Time: 2 min  at 250 mmHg          Operative Indications:  The patient has a history of Trigger Finger  left  ring finger that was recalcitrant to conservative management.  The decision was made to bring the patient to the operating room for Trigger Finger Release  left  ring finger.  Risks of the procedure were explained which include, but are not limited to bleeding; infection; damage to nerves, arteries,veins, tendons; scar; pain; need for reoperation; failure to give desired result; and risks of anaesthesia.  All questions were answered to satisfaction and they were willing to proceed.         Operative Findings:  Hypertrophy A1 pulley    Complications:   None    Procedure and Technique:  After the patient, site, and procedure were identified, the patient was brought into the operating room in a supine position.  Local anaesthesia awas  provided.  A well padded tourniquet was applied to the extremity, set at 250 mmHg.  The  left upper extremity was then prepped and drapped in a normal, sterile, orthopedic fashion.       A  longitudinal  incision is made in line with the left ring  finger overlying the A1 pulley.. Dissection was  carried down under loupe magnification. Skin and subcutaneous tissues were sharply incised. The tissue was elevated off the A1 pulley. The A1 pulley was   identified and incised. There was no  retinacular cyst noted. . The FDS and FDP were noted to have independent glide after release. The patient was able to fully flex and extend without any triggering.       At the completion of the procedure, hemostasis was obtained with cautery and direct pressure.  The wounds were copiously irrigated with sterile solution.  The wounds were closed with Prolene.  Sterile dressings were applied, including Xeroform, gauze, tweeners, webril, ACE.  Please note, all sponge, needle, and instrument counts were correct prior to closure.  Loupe magnification was utilized.  The patient tolerated the procedure well.     I was present for the entire procedure., A qualified resident physician was not available., and A physician assistant was required during the procedure for retraction, tissue handling, dissection and suturing.    Patient Disposition:  PACU     SIGNATURE: Sheila Dumont MD  DATE: 04/12/24  TIME: 7:45 AM

## 2024-04-24 ENCOUNTER — OFFICE VISIT (OUTPATIENT)
Dept: OBGYN CLINIC | Facility: CLINIC | Age: 59
End: 2024-04-24

## 2024-04-24 VITALS — BODY MASS INDEX: 32.57 KG/M2 | WEIGHT: 177 LBS | HEIGHT: 62 IN

## 2024-04-24 DIAGNOSIS — Z98.890 S/P TRIGGER FINGER RELEASE: Primary | ICD-10-CM

## 2024-04-24 DIAGNOSIS — M65.342 TRIGGER FINGER, LEFT RING FINGER: ICD-10-CM

## 2024-04-24 PROCEDURE — 99024 POSTOP FOLLOW-UP VISIT: CPT | Performed by: SURGERY

## 2024-04-24 NOTE — PROGRESS NOTES
Assessment/Plan:  Patient ID: Melissa Dickens 58 y.o. female   Surgery: Release Trigger Ring Finger - Left  Date of Surgery: 4/12/2024    12 days s/p left ring finger tripper finger release. The patient is doing well overall. The patient has occasional popping into the A3 pulley region. The patient was informed the popping should subside with time along with stiffness and soreness. The patient is encouraged to perform scar tissue massage and avoid standing water until the end of the week.    Follow Up:  PRN        CHIEF COMPLAINT:  Chief Complaint   Patient presents with   • Post-op     Left ring finger   Pain when she bends it and stiff          SUBJECTIVE:  Melissa Dickens is a 58 y.o. year old female who presents for follow up 12 days after Release Trigger Ring Finger - Left. Today patient has complaint of popping at distal left ring finger. The patient notes stiffness and soreness localized to the incision.       PHYSICAL EXAMINATION:  General: well developed and well nourished, alert, oriented times 3, and appears comfortable  Psychiatric: Normal    MUSCULOSKELETAL EXAMINATION:  Incision: Clean, dry, intact  Surgery Site: normal, no evidence of infection   Range of Motion: As expected and Limited due to stiffness  Neurovascular status: Neuro intact, good cap refill  Activity Restrictions: No restrictions  Done today: Sutures out        STUDIES REVIEWED:  No new imaging.    LABS REVIEWED:    HgA1c:   Lab Results   Component Value Date    HGBA1C 5.4 08/27/2022     BMP:   Lab Results   Component Value Date    CALCIUM 9.4 03/09/2024    K 4.8 03/09/2024    CO2 30 03/09/2024     03/09/2024    BUN 10 03/09/2024    CREATININE 1.03 03/09/2024           PROCEDURES PERFORMED:  Procedures  No Procedures performed today    Scribe Attestation    I,:  Gayatri Rangel am acting as a scribe while in the presence of the attending physician.:       I,:  Sheila Dumont MD personally performed the services described in this  documentation    as scribed in my presence.:

## 2024-05-01 ENCOUNTER — TELEPHONE (OUTPATIENT)
Age: 59
End: 2024-05-01

## 2024-05-01 NOTE — TELEPHONE ENCOUNTER
Called patient, this is likely arthritis flaring up in the finger. Advised heat and anti-inflammatories in addition to tylenol. Patient scheduled for next week

## 2024-05-01 NOTE — TELEPHONE ENCOUNTER
Can you please call patient and ask what issues she is having and if it something that we may be able to answer over the phone vs having her come in next week

## 2024-05-01 NOTE — TELEPHONE ENCOUNTER
Hello,  Please advise if the following patient can be forced onto the schedule:    Patient: Melissa Dickens     : 1965    MRN: [id number]    Call back #: [phone]    Insurance: [employer]     Reason for appointment: Post-op , patient would like to discuss a few issues     Requested doctor and/or location: shivam shanks       Thank you.

## 2024-05-08 ENCOUNTER — APPOINTMENT (OUTPATIENT)
Dept: RADIOLOGY | Facility: AMBULARY SURGERY CENTER | Age: 59
End: 2024-05-08
Attending: SURGERY
Payer: COMMERCIAL

## 2024-05-08 ENCOUNTER — TELEPHONE (OUTPATIENT)
Dept: SURGERY | Facility: CLINIC | Age: 59
End: 2024-05-08

## 2024-05-08 ENCOUNTER — OFFICE VISIT (OUTPATIENT)
Dept: OBGYN CLINIC | Facility: CLINIC | Age: 59
End: 2024-05-08
Payer: COMMERCIAL

## 2024-05-08 VITALS
DIASTOLIC BLOOD PRESSURE: 76 MMHG | HEART RATE: 73 BPM | WEIGHT: 177 LBS | HEIGHT: 62 IN | BODY MASS INDEX: 32.57 KG/M2 | SYSTOLIC BLOOD PRESSURE: 127 MMHG

## 2024-05-08 DIAGNOSIS — M15.1 DEGENERATIVE ARTHRITIS OF DISTAL INTERPHALANGEAL JOINT OF LITTLE FINGER OF LEFT HAND: Primary | ICD-10-CM

## 2024-05-08 DIAGNOSIS — M65.342 TRIGGER FINGER, LEFT RING FINGER: ICD-10-CM

## 2024-05-08 PROCEDURE — 20600 DRAIN/INJ JOINT/BURSA W/O US: CPT | Performed by: SURGERY

## 2024-05-08 PROCEDURE — 99024 POSTOP FOLLOW-UP VISIT: CPT | Performed by: SURGERY

## 2024-05-08 PROCEDURE — 73140 X-RAY EXAM OF FINGER(S): CPT

## 2024-05-08 RX ORDER — BUPIVACAINE HYDROCHLORIDE 2.5 MG/ML
0.5 INJECTION, SOLUTION INFILTRATION; PERINEURAL
Status: COMPLETED | OUTPATIENT
Start: 2024-05-08 | End: 2024-05-08

## 2024-05-08 RX ORDER — TRIAMCINOLONE ACETONIDE 40 MG/ML
20 INJECTION, SUSPENSION INTRA-ARTICULAR; INTRAMUSCULAR
Status: COMPLETED | OUTPATIENT
Start: 2024-05-08 | End: 2024-05-08

## 2024-05-08 RX ADMIN — BUPIVACAINE HYDROCHLORIDE 0.5 ML: 2.5 INJECTION, SOLUTION INFILTRATION; PERINEURAL at 08:30

## 2024-05-08 RX ADMIN — TRIAMCINOLONE ACETONIDE 20 MG: 40 INJECTION, SUSPENSION INTRA-ARTICULAR; INTRAMUSCULAR at 08:30

## 2024-05-08 NOTE — TELEPHONE ENCOUNTER
Called patient to see if she wanted to move her surgery up to 6/6 instead of 6/17 with Dr. Salgado.

## 2024-05-08 NOTE — PROGRESS NOTES
Assessment/Plan:  Patient ID: Melissa Dickens 58 y.o. female   Surgery: Release Trigger Ring Finger - Left  Date of Surgery: 4/12/2024    X-rays obtained and reviewed today of the left ring finger demonstrating arthritic changes at the ring finger DIP and PIP as well as several other joints of the digits  Suspect flare of arthritis following surgery which is not uncommon  Treatment options discussed including heat, anti-inflammatories, and steroid injections. Risks, benefits, and alternatives to injection was discussed today and initial left ring DIP joint injection was performed in office today  Injection may be repeated as often as every 3 months as needed  May attempt to wean out of the night time finger splint as steroid begins to work     Follow Up:  PRN    To Do Next Visit:         CHIEF COMPLAINT:  Chief Complaint   Patient presents with    Post-op     Left ring  trigger finger release   Still having pain          SUBJECTIVE:  Melissa Dickens is a 58 y.o. year old female who presents for follow up after Release Trigger Ring Finger - Left. Patient notes pain in the operative finger primarily in the DIP joint. Also with some residual soreness in the surgical site. No clicking or locking, no paresthesias, no injury since surgery.       PHYSICAL EXAMINATION:  General: well developed and well nourished, alert, oriented times 3, and appears comfortable  Psychiatric: Normal    MUSCULOSKELETAL EXAMINATION:  Incision: healed  Surgery Site: normal, no evidence of infection   Range of Motion: opposition intact and full composite fist possible  Neurovascular status: Neuro intact, good cap refill  Activity Restrictions: No restrictions     Left ring finger: slight deviation of the ring finger at the DIP joint suggestive of OA, tender at PIP and DIP joints, full ROM, no locking or clicking      STUDIES REVIEWED:  I have personally reviewed and interpreted  AP lateral and oblique radiographs of left ring finger which  "demonstrate arthritic changes at multiple joints of the hands and digits, including the ring finger DIP joint       LABS REVIEWED:    HgA1c:   Lab Results   Component Value Date    HGBA1C 5.4 08/27/2022     BMP:   Lab Results   Component Value Date    CALCIUM 9.4 03/09/2024    K 4.8 03/09/2024    CO2 30 03/09/2024     03/09/2024    BUN 10 03/09/2024    CREATININE 1.03 03/09/2024           PROCEDURES PERFORMED:  Small joint arthrocentesis: L ring DIP  Universal Protocol:  Consent: Verbal consent obtained.  Risks and benefits: risks, benefits and alternatives were discussed  Consent given by: patient  Time out: Immediately prior to procedure a \"time out\" was called to verify the correct patient, procedure, equipment, support staff and site/side marked as required.  Patient understanding: patient states understanding of the procedure being performed  Site marked: the operative site was marked  Radiology Images displayed and confirmed. If images not available, report reviewed: imaging studies available  Required items: required blood products, implants, devices, and special equipment available  Patient identity confirmed: verbally with patient  Supporting Documentation  Indications: pain   Procedure Details  Location: ring finger - L ring DIP  Needle size: 25 G  Ultrasound guidance: no  Approach: dorsal  Medications administered: 20 mg triamcinolone acetonide 40 mg/mL; 0.5 mL bupivacaine 0.25 %    Patient tolerance: patient tolerated the procedure well with no immediate complications  Dressing:  Sterile dressing applied                 Scribe Attestation      I,:  Libertad Dalal PA-C am acting as a scribe while in the presence of the attending physician.:       I,:  Sheila Dumont MD personally performed the services described in this documentation    as scribed in my presence.:             "

## 2024-05-26 DIAGNOSIS — I10 ESSENTIAL HYPERTENSION: ICD-10-CM

## 2024-05-26 RX ORDER — VALSARTAN 40 MG/1
TABLET ORAL
Qty: 90 TABLET | Refills: 1 | Status: SHIPPED | OUTPATIENT
Start: 2024-05-26

## 2024-05-31 ENCOUNTER — APPOINTMENT (OUTPATIENT)
Dept: LAB | Facility: CLINIC | Age: 59
End: 2024-05-31
Payer: COMMERCIAL

## 2024-05-31 ENCOUNTER — OFFICE VISIT (OUTPATIENT)
Dept: LAB | Facility: CLINIC | Age: 59
End: 2024-05-31
Payer: COMMERCIAL

## 2024-05-31 DIAGNOSIS — Z01.818 ENCOUNTER FOR PREADMISSION TESTING: ICD-10-CM

## 2024-05-31 LAB
ALBUMIN SERPL BCP-MCNC: 4.4 G/DL (ref 3.5–5)
ALP SERPL-CCNC: 87 U/L (ref 34–104)
ALT SERPL W P-5'-P-CCNC: 21 U/L (ref 7–52)
ANION GAP SERPL CALCULATED.3IONS-SCNC: 4 MMOL/L (ref 4–13)
AST SERPL W P-5'-P-CCNC: 19 U/L (ref 13–39)
BASOPHILS # BLD AUTO: 0.05 THOUSANDS/ÂΜL (ref 0–0.1)
BASOPHILS NFR BLD AUTO: 1 % (ref 0–1)
BILIRUB SERPL-MCNC: 0.8 MG/DL (ref 0.2–1)
BUN SERPL-MCNC: 12 MG/DL (ref 5–25)
CALCIUM SERPL-MCNC: 9.5 MG/DL (ref 8.4–10.2)
CHLORIDE SERPL-SCNC: 103 MMOL/L (ref 96–108)
CO2 SERPL-SCNC: 30 MMOL/L (ref 21–32)
CREAT SERPL-MCNC: 1.09 MG/DL (ref 0.6–1.3)
EOSINOPHIL # BLD AUTO: 0.12 THOUSAND/ÂΜL (ref 0–0.61)
EOSINOPHIL NFR BLD AUTO: 2 % (ref 0–6)
ERYTHROCYTE [DISTWIDTH] IN BLOOD BY AUTOMATED COUNT: 11.9 % (ref 11.6–15.1)
GFR SERPL CREATININE-BSD FRML MDRD: 55 ML/MIN/1.73SQ M
GLUCOSE P FAST SERPL-MCNC: 96 MG/DL (ref 65–99)
HCT VFR BLD AUTO: 45.5 % (ref 34.8–46.1)
HGB BLD-MCNC: 14.8 G/DL (ref 11.5–15.4)
IMM GRANULOCYTES # BLD AUTO: 0.03 THOUSAND/UL (ref 0–0.2)
IMM GRANULOCYTES NFR BLD AUTO: 0 % (ref 0–2)
LYMPHOCYTES # BLD AUTO: 1.61 THOUSANDS/ÂΜL (ref 0.6–4.47)
LYMPHOCYTES NFR BLD AUTO: 21 % (ref 14–44)
MCH RBC QN AUTO: 32.2 PG (ref 26.8–34.3)
MCHC RBC AUTO-ENTMCNC: 32.5 G/DL (ref 31.4–37.4)
MCV RBC AUTO: 99 FL (ref 82–98)
MONOCYTES # BLD AUTO: 0.59 THOUSAND/ÂΜL (ref 0.17–1.22)
MONOCYTES NFR BLD AUTO: 8 % (ref 4–12)
NEUTROPHILS # BLD AUTO: 5.4 THOUSANDS/ÂΜL (ref 1.85–7.62)
NEUTS SEG NFR BLD AUTO: 68 % (ref 43–75)
NRBC BLD AUTO-RTO: 0 /100 WBCS
PLATELET # BLD AUTO: 267 THOUSANDS/UL (ref 149–390)
PMV BLD AUTO: 10 FL (ref 8.9–12.7)
POTASSIUM SERPL-SCNC: 5.1 MMOL/L (ref 3.5–5.3)
PROT SERPL-MCNC: 7.2 G/DL (ref 6.4–8.4)
RBC # BLD AUTO: 4.59 MILLION/UL (ref 3.81–5.12)
SODIUM SERPL-SCNC: 137 MMOL/L (ref 135–147)
WBC # BLD AUTO: 7.8 THOUSAND/UL (ref 4.31–10.16)

## 2024-05-31 PROCEDURE — 80053 COMPREHEN METABOLIC PANEL: CPT

## 2024-05-31 PROCEDURE — 36415 COLL VENOUS BLD VENIPUNCTURE: CPT

## 2024-05-31 PROCEDURE — 85025 COMPLETE CBC W/AUTO DIFF WBC: CPT

## 2024-05-31 PROCEDURE — 93005 ELECTROCARDIOGRAM TRACING: CPT

## 2024-06-02 LAB
ATRIAL RATE: 70 BPM
P AXIS: 32 DEGREES
PR INTERVAL: 152 MS
QRS AXIS: 0 DEGREES
QRSD INTERVAL: 78 MS
QT INTERVAL: 392 MS
QTC INTERVAL: 423 MS
T WAVE AXIS: 21 DEGREES
VENTRICULAR RATE: 70 BPM

## 2024-06-02 PROCEDURE — 93010 ELECTROCARDIOGRAM REPORT: CPT | Performed by: INTERNAL MEDICINE

## 2024-06-06 ENCOUNTER — ANESTHESIA EVENT (OUTPATIENT)
Dept: PERIOP | Facility: HOSPITAL | Age: 59
End: 2024-06-06
Payer: COMMERCIAL

## 2024-06-06 NOTE — PRE-PROCEDURE INSTRUCTIONS
Pre-Surgery Instructions:   Medication Instructions    buPROPion (WELLBUTRIN XL) 150 mg 24 hr tablet Take day of surgery.    escitalopram (LEXAPRO) 5 mg tablet Take night before surgery    ezetimibe (ZETIA) 10 mg tablet Take night before surgery    pravastatin (PRAVACHOL) 20 mg tablet Take night before surgery        Medication instructions for day surgery reviewed. Please use only a sip of water to take your instructed medications. Avoid all over the counter vitamins, supplements and NSAIDS for one week prior to surgery per anesthesia guidelines. Tylenol is ok to take as needed.     You will receive a call one business day prior to surgery with an arrival time and hospital directions. If your surgery is scheduled on a Monday, the hospital will be calling you on the Friday prior to your surgery. If you have not heard from anyone by 8pm, please call the hospital supervisor through the hospital  at [phone]. ([place] [phone] or [place] [phone]).    Do not eat or drink anything after midnight the night before your surgery, including candy, mints, lifesavers, or chewing gum. Do not drink alcohol 24hrs before your surgery. Try not to smoke at least 24hrs before your surgery.       Follow the pre surgery showering instructions as listed in the “My Surgical Experience Booklet” or otherwise provided by your surgeon's office. Do not use a blade to shave the surgical area 1 week before surgery. It is okay to use a clean electric clippers up to 24 hours before surgery. Do not apply any lotions, creams, including makeup, cologne, deodorant, or perfumes after showering on the day of your surgery. Do not use dry shampoo, hair spray, hair gel, or any type of hair products.     No contact lenses, eye make-up, or artificial eyelashes. Remove nail polish, including gel polish, and any artificial, gel, or acrylic nails if possible. Remove all jewelry including rings and body piercing jewelry.     Wear causal  clothing that is easy to take on and off. Consider your type of surgery.    Keep any valuables, jewelry, piercings at home. Please bring any specially ordered equipment (sling, braces) if indicated.    Arrange for a responsible person to drive you to and from the hospital on the day of your surgery. Please confirm the visitor policy for the day of your procedure when you receive your phone call with an arrival time.     Call the surgeon's office with any new illnesses, exposures, or additional questions prior to surgery.    Please reference your “My Surgical Experience Booklet” for additional information to prepare for your upcoming surgery.

## 2024-06-17 ENCOUNTER — ANESTHESIA (OUTPATIENT)
Dept: PERIOP | Facility: HOSPITAL | Age: 59
End: 2024-06-17
Payer: COMMERCIAL

## 2024-06-17 ENCOUNTER — HOSPITAL ENCOUNTER (OUTPATIENT)
Facility: HOSPITAL | Age: 59
Setting detail: OUTPATIENT SURGERY
Discharge: HOME | End: 2024-06-17
Attending: SURGERY | Admitting: SURGERY
Payer: COMMERCIAL

## 2024-06-17 VITALS
HEIGHT: 62 IN | WEIGHT: 177.8 LBS | TEMPERATURE: 97.9 F | DIASTOLIC BLOOD PRESSURE: 70 MMHG | HEART RATE: 82 BPM | BODY MASS INDEX: 32.72 KG/M2 | OXYGEN SATURATION: 98 % | RESPIRATION RATE: 18 BRPM | SYSTOLIC BLOOD PRESSURE: 118 MMHG

## 2024-06-17 DIAGNOSIS — K82.4 GALLBLADDER POLYP: ICD-10-CM

## 2024-06-17 PROCEDURE — NC001 PR NO CHARGE: Performed by: SURGERY

## 2024-06-17 PROCEDURE — 47562 LAPAROSCOPIC CHOLECYSTECTOMY: CPT | Performed by: SURGERY

## 2024-06-17 PROCEDURE — 88304 TISSUE EXAM BY PATHOLOGIST: CPT | Performed by: STUDENT IN AN ORGANIZED HEALTH CARE EDUCATION/TRAINING PROGRAM

## 2024-06-17 PROCEDURE — 47562 LAPAROSCOPIC CHOLECYSTECTOMY: CPT | Performed by: PHYSICIAN ASSISTANT

## 2024-06-17 PROCEDURE — S2900 ROBOTIC SURGICAL SYSTEM: HCPCS | Performed by: SURGERY

## 2024-06-17 RX ORDER — FENTANYL CITRATE 50 UG/ML
INJECTION, SOLUTION INTRAMUSCULAR; INTRAVENOUS AS NEEDED
Status: DISCONTINUED | OUTPATIENT
Start: 2024-06-17 | End: 2024-06-17

## 2024-06-17 RX ORDER — DEXAMETHASONE SODIUM PHOSPHATE 10 MG/ML
INJECTION, SOLUTION INTRAMUSCULAR; INTRAVENOUS AS NEEDED
Status: DISCONTINUED | OUTPATIENT
Start: 2024-06-17 | End: 2024-06-17

## 2024-06-17 RX ORDER — ACETAMINOPHEN 325 MG/1
975 TABLET ORAL ONCE
Status: COMPLETED | OUTPATIENT
Start: 2024-06-17 | End: 2024-06-17

## 2024-06-17 RX ORDER — CEFAZOLIN SODIUM 2 G/50ML
2000 SOLUTION INTRAVENOUS ONCE
Status: COMPLETED | OUTPATIENT
Start: 2024-06-17 | End: 2024-06-17

## 2024-06-17 RX ORDER — ROCURONIUM BROMIDE 10 MG/ML
INJECTION, SOLUTION INTRAVENOUS AS NEEDED
Status: DISCONTINUED | OUTPATIENT
Start: 2024-06-17 | End: 2024-06-17

## 2024-06-17 RX ORDER — ONDANSETRON 2 MG/ML
INJECTION INTRAMUSCULAR; INTRAVENOUS AS NEEDED
Status: DISCONTINUED | OUTPATIENT
Start: 2024-06-17 | End: 2024-06-17

## 2024-06-17 RX ORDER — PROMETHAZINE HYDROCHLORIDE 25 MG/ML
25 INJECTION, SOLUTION INTRAMUSCULAR; INTRAVENOUS ONCE AS NEEDED
Status: DISCONTINUED | OUTPATIENT
Start: 2024-06-17 | End: 2024-06-17 | Stop reason: HOSPADM

## 2024-06-17 RX ORDER — HYDROMORPHONE HCL/PF 1 MG/ML
0.5 SYRINGE (ML) INJECTION
Status: DISCONTINUED | OUTPATIENT
Start: 2024-06-17 | End: 2024-06-17 | Stop reason: HOSPADM

## 2024-06-17 RX ORDER — DIPHENHYDRAMINE HYDROCHLORIDE 50 MG/ML
12.5 INJECTION INTRAMUSCULAR; INTRAVENOUS ONCE AS NEEDED
Status: DISCONTINUED | OUTPATIENT
Start: 2024-06-17 | End: 2024-06-17 | Stop reason: HOSPADM

## 2024-06-17 RX ORDER — OXYCODONE HYDROCHLORIDE AND ACETAMINOPHEN 5; 325 MG/1; MG/1
1 TABLET ORAL EVERY 4 HOURS PRN
Qty: 20 TABLET | Refills: 0 | Status: SHIPPED | OUTPATIENT
Start: 2024-06-17 | End: 2024-06-27

## 2024-06-17 RX ORDER — INDOCYANINE GREEN AND WATER 25 MG
2.5 KIT INJECTION ONCE
Status: COMPLETED | OUTPATIENT
Start: 2024-06-17 | End: 2024-06-17

## 2024-06-17 RX ORDER — MAGNESIUM HYDROXIDE 1200 MG/15ML
LIQUID ORAL AS NEEDED
Status: DISCONTINUED | OUTPATIENT
Start: 2024-06-17 | End: 2024-06-17 | Stop reason: HOSPADM

## 2024-06-17 RX ORDER — MEPERIDINE HYDROCHLORIDE 25 MG/ML
12.5 INJECTION INTRAMUSCULAR; INTRAVENOUS; SUBCUTANEOUS ONCE AS NEEDED
Status: DISCONTINUED | OUTPATIENT
Start: 2024-06-17 | End: 2024-06-17 | Stop reason: HOSPADM

## 2024-06-17 RX ORDER — FENTANYL CITRATE/PF 50 MCG/ML
50 SYRINGE (ML) INJECTION
Status: DISCONTINUED | OUTPATIENT
Start: 2024-06-17 | End: 2024-06-17 | Stop reason: HOSPADM

## 2024-06-17 RX ORDER — PROPOFOL 10 MG/ML
INJECTION, EMULSION INTRAVENOUS AS NEEDED
Status: DISCONTINUED | OUTPATIENT
Start: 2024-06-17 | End: 2024-06-17

## 2024-06-17 RX ORDER — SODIUM CHLORIDE, SODIUM LACTATE, POTASSIUM CHLORIDE, CALCIUM CHLORIDE 600; 310; 30; 20 MG/100ML; MG/100ML; MG/100ML; MG/100ML
INJECTION, SOLUTION INTRAVENOUS CONTINUOUS PRN
Status: DISCONTINUED | OUTPATIENT
Start: 2024-06-17 | End: 2024-06-17

## 2024-06-17 RX ORDER — NEOSTIGMINE METHYLSULFATE 1 MG/ML
INJECTION INTRAVENOUS AS NEEDED
Status: DISCONTINUED | OUTPATIENT
Start: 2024-06-17 | End: 2024-06-17

## 2024-06-17 RX ORDER — LIDOCAINE HYDROCHLORIDE 10 MG/ML
INJECTION, SOLUTION EPIDURAL; INFILTRATION; INTRACAUDAL; PERINEURAL AS NEEDED
Status: DISCONTINUED | OUTPATIENT
Start: 2024-06-17 | End: 2024-06-17

## 2024-06-17 RX ORDER — METOCLOPRAMIDE HYDROCHLORIDE 5 MG/ML
10 INJECTION INTRAMUSCULAR; INTRAVENOUS ONCE AS NEEDED
Status: DISCONTINUED | OUTPATIENT
Start: 2024-06-17 | End: 2024-06-17 | Stop reason: HOSPADM

## 2024-06-17 RX ORDER — BUPIVACAINE HYDROCHLORIDE AND EPINEPHRINE 2.5; 5 MG/ML; UG/ML
INJECTION, SOLUTION EPIDURAL; INFILTRATION; INTRACAUDAL; PERINEURAL AS NEEDED
Status: DISCONTINUED | OUTPATIENT
Start: 2024-06-17 | End: 2024-06-17 | Stop reason: HOSPADM

## 2024-06-17 RX ORDER — GLYCOPYRROLATE 0.2 MG/ML
INJECTION INTRAMUSCULAR; INTRAVENOUS AS NEEDED
Status: DISCONTINUED | OUTPATIENT
Start: 2024-06-17 | End: 2024-06-17

## 2024-06-17 RX ORDER — EPHEDRINE SULFATE 50 MG/ML
INJECTION INTRAVENOUS AS NEEDED
Status: DISCONTINUED | OUTPATIENT
Start: 2024-06-17 | End: 2024-06-17

## 2024-06-17 RX ORDER — MIDAZOLAM HYDROCHLORIDE 2 MG/2ML
INJECTION, SOLUTION INTRAMUSCULAR; INTRAVENOUS AS NEEDED
Status: DISCONTINUED | OUTPATIENT
Start: 2024-06-17 | End: 2024-06-17

## 2024-06-17 RX ADMIN — INDOCYANINE GREEN AND WATER 2.5 MG: KIT at 11:58

## 2024-06-17 RX ADMIN — MIDAZOLAM 2 MG: 1 INJECTION INTRAMUSCULAR; INTRAVENOUS at 12:32

## 2024-06-17 RX ADMIN — ROCURONIUM BROMIDE 50 MG: 50 INJECTION, SOLUTION INTRAVENOUS at 12:39

## 2024-06-17 RX ADMIN — SODIUM CHLORIDE, SODIUM LACTATE, POTASSIUM CHLORIDE, AND CALCIUM CHLORIDE 1000 ML: .6; .31; .03; .02 INJECTION, SOLUTION INTRAVENOUS at 11:36

## 2024-06-17 RX ADMIN — GLYCOPYRROLATE 0.6 MG: 0.2 INJECTION, SOLUTION INTRAMUSCULAR; INTRAVENOUS at 13:45

## 2024-06-17 RX ADMIN — EPHEDRINE SULFATE 10 MG: 50 INJECTION, SOLUTION INTRAVENOUS at 13:02

## 2024-06-17 RX ADMIN — DEXAMETHASONE SODIUM PHOSPHATE 10 MG: 10 INJECTION, SOLUTION INTRAMUSCULAR; INTRAVENOUS at 12:48

## 2024-06-17 RX ADMIN — ONDANSETRON 4 MG: 2 INJECTION INTRAMUSCULAR; INTRAVENOUS at 13:45

## 2024-06-17 RX ADMIN — LIDOCAINE HYDROCHLORIDE 50 MG: 10 INJECTION, SOLUTION EPIDURAL; INFILTRATION; INTRACAUDAL; PERINEURAL at 12:38

## 2024-06-17 RX ADMIN — FENTANYL CITRATE 50 MCG: 50 INJECTION INTRAMUSCULAR; INTRAVENOUS at 12:38

## 2024-06-17 RX ADMIN — CEFAZOLIN SODIUM 2000 MG: 2 SOLUTION INTRAVENOUS at 12:45

## 2024-06-17 RX ADMIN — PROPOFOL 160 MG: 10 INJECTION, EMULSION INTRAVENOUS at 12:39

## 2024-06-17 RX ADMIN — NEOSTIGMINE METHYLSULFATE 3 MG: 1 INJECTION INTRAVENOUS at 13:45

## 2024-06-17 RX ADMIN — EPHEDRINE SULFATE 10 MG: 50 INJECTION, SOLUTION INTRAVENOUS at 12:59

## 2024-06-17 RX ADMIN — SODIUM CHLORIDE, SODIUM LACTATE, POTASSIUM CHLORIDE, AND CALCIUM CHLORIDE: .6; .31; .03; .02 INJECTION, SOLUTION INTRAVENOUS at 11:19

## 2024-06-17 RX ADMIN — ACETAMINOPHEN 975 MG: 325 TABLET, FILM COATED ORAL at 11:31

## 2024-06-17 RX ADMIN — FENTANYL CITRATE 50 MCG: 50 INJECTION INTRAMUSCULAR; INTRAVENOUS at 13:14

## 2024-06-17 NOTE — ANESTHESIA POSTPROCEDURE EVALUATION
Post-Op Assessment Note    CV Status:  Stable  Pain Score: 0    Pain management: adequate       Mental Status:  Alert and awake   Hydration Status:  Euvolemic   PONV Controlled:  Controlled   Airway Patency:  Patent     Post Op Vitals Reviewed: Yes    No anethesia notable event occurred.    Staff: CRNA               /66 (06/17/24 1408)    Temp 98.3 °F (36.8 °C) (06/17/24 1408)    Pulse 86 (06/17/24 1408)   Resp 14 (06/17/24 1408)    SpO2 96 % (06/17/24 1408)

## 2024-06-17 NOTE — ANESTHESIA PREPROCEDURE EVALUATION
Procedure:  ROBOTIC CHOLECYSTECTOMY, POSSIBLE OPEN (Abdomen)    Relevant Problems   ANESTHESIA (within normal limits)      CARDIO   (+) Essential hypertension   (+) Mild mitral regurgitation   (+) Nonrheumatic aortic valve stenosis      GI/HEPATIC   (+) GERD (gastroesophageal reflux disease)      NEURO/PSYCH   (+) Moderate major depression, single episode (HCC)      PULMONARY   (+) Cigarette smoker      Other   (+) Dyslipidemia       TTE 6/10/2022    Left Ventricle: Left ventricular cavity size is normal. Wall thickness is normal. The left ventricular ejection fraction is 60%. Systolic function is normal. Wall motion is normal. Diastolic function is normal.    Aortic Valve: There is mild stenosis. The aortic valve mean gradient is 11 mmHg. The DVI is 0.44. The aortic valve area is 1.39 cm2.    Mitral Valve: There is mild regurgitation.    Tricuspid Valve: There is mild regurgitation.      Physical Exam    Airway    Mallampati score: II  TM Distance: >3 FB  Neck ROM: full     Dental   No notable dental hx     Cardiovascular      Pulmonary      Other Findings  post-pubertal.      Anesthesia Plan  ASA Score- 2     Anesthesia Type- general with ASA Monitors.         Additional Monitors:     Airway Plan:            Plan Factors-Exercise tolerance (METS): >4 METS.    Chart reviewed. EKG reviewed.  Existing labs reviewed. Patient summary reviewed.    Patient is a current smoker.              Induction- intravenous.    Postoperative Plan- Plan for postoperative opioid use.         Informed Consent- Anesthetic plan and risks discussed with patient.  I personally reviewed this patient with the CRNA. Discussed and agreed on the Anesthesia Plan with the CRNA..

## 2024-06-17 NOTE — OP NOTE
OPERATIVE REPORT  PATIENT NAME: Melissa Dickens    :  1965  MRN: [id number]  Pt Location: [facility] OR ROOM 02    SURGERY DATE: 2024    Surgeons and Role:     * Hiram Salgado MD - Primary     * Karen Hills PA-C - Assisting    Preop Diagnosis:  Gallbladder polyp [K82.4]    Post-Op Diagnosis Codes:     * Gallbladder polyp [K82.4]    Procedure(s):  ROBOTIC CHOLECYSTECTOMY. POSSIBLE OPEN    Specimen(s):  ID Type Source Tests Collected by Time Destination   1 : Gallbladder Tissue Gallbladder TISSUE EXAM Hiram Salgado MD 2024 1318        Estimated Blood Loss:   Minimal    Drains:  * No LDAs found *    Anesthesia Type:   General    Operative Indications:  Gallbladder polyp [K82.4]      Operative Findings:  Intrahepatic gallbladder.  The gallbladder with wall was thickened.  Critical view of safety was obtained where I could see only 2 tubular structures going into the gallbladder and could clearly see the posterior wall of the gallbladder in between them.  The biliary anatomy was confirmed using immunofluorescence.      Complications:   None    Procedure and Technique:  Patient was brought to the operating room and identified correctly.  General anesthesia given anesthesia team.  Parts prepped and draped in standard fashion.  Timeout performed..  Patient received preoperative IV antibiotic.  Supraumbilical incision was made and deepened to the fascia.  Using Optiview technique and 5 mm port access was gained to the abdominal cavity.  Insufflation was done to 15 mmHg using CO2.  We checked for injuries and none were found.  The robotic ports were then inserted in the subcostal region on the right side in the midclavicular and anterior axillary line.  Another robotic port was inserted in the left epigastric region.  The initial entry trocar was exchanged for a 12 mm port and a robotic port was nested in it.  Patient was placed in head up left side down position.  The robot was then brought to the  field and docked.  Robotic instruments were inserted.  The fundus of the gallbladder was elevated using a ProGrasp.  The peritoneal fold around the Calot's triangle were opened using a hook.  Cystic duct and cystic artery were isolated and dissected.  Critical view of safety was obtained.  Biliary anatomy confirmed using immunofluorescence.  Multiple clips applied on the cystic duct and cystic artery and the 2 structures were divided between the clips.  The gallbladder was then dissected from the gallbladder fossa using hook electrocautery.  The hemostasis was found to be adequate.  The specimen was then delivered into the Endo Catch bag.  The robot was undocked.  Endo Catch bag along with the specimen was removed and sent for pathology.  All the ports were removed under full visual guidance.  Hemostasis adequate at all port site.  The supraumbilical port site was closed at the fascia using 0 Vicryl in interrupted fashion.  Local anesthetic given at all port site and the skin was closed using 4-0 Monocryl in subcuticular fashion.  Exofin was applied.  Patient was then reversed from anesthesia and taken to the recovery under stable condition.   I was present for the entire procedure., A qualified resident physician was not available., and A physician assistant was required during the procedure for retraction, tissue handling, dissection and suturing.    Patient Disposition:  PACU         SIGNATURE: Hiram Salgado MD  DATE: June 17, 2024  TIME: 1:49 PM

## 2024-06-17 NOTE — H&P
H&P Exam - General Surgery   Melissa Dickens 58 y.o. female MRN: [id number]  Unit/Bed#: OR [place] Encounter: [id number]    Assessment & Plan     Assessment:  1 cm GB polyp  Plan:  Elective robotic cholecystectomy today    History of Present Illness   History, ROS and PFSH unobtainable from any source due to .  HPI:  Melissa Dickens is a 58 y.o. female who presents elective cholecystectomy. She had left hand surgery recently and is fully recovered. No new medical problems since then..    Review of Systems   All other systems reviewed and are negative.      Historical Information   Past Medical History:   Diagnosis Date    Colon polyp     Depression     GERD (gastroesophageal reflux disease)     21 under control at this time, no meds    Heart murmur     HL (hearing loss)     Hyperlipidemia     Hypertension     Inflammatory bowel disease     Joint pain     left hip labral tear    Tinnitus     Ulcerative colitis (HCC)     19 no meds at this time    Vertigo      Past Surgical History:   Procedure Laterality Date     SECTION N/A 189,1993    COLONOSCOPY  2017    COLONOSCOPY      FL INJECTION LEFT HIP (NON ARTHROGRAM)  12/10/2018    FL INJECTION LEFT HIP (NON ARTHROGRAM)  2020    FL INJECTION LEFT HIP (NON ARTHROGRAM)  2022    FL INJECTION LEFT HIP (NON ARTHROGRAM)  2022    MAMMO (HISTORICAL)  2020    PA TENDON SHEATH INCISION Left 2024    Procedure: RELEASE TRIGGER RING FINGER;  Surgeon: Sheila Dumont MD;  Location:  MAIN OR;  Service: Orthopedics    WISDOM TOOTH EXTRACTION       Social History   Social History     Substance and Sexual Activity   Alcohol Use Yes    Comment: rarely     Social History     Substance and Sexual Activity   Drug Use Never     Social History     Tobacco Use   Smoking Status Every Day    Current packs/day: 0.75    Average packs/day: 0.8 packs/day for 43.8 years (32.8 ttl pk-yrs)    Types: Cigarettes    Start date: 9/3/1980   Smokeless Tobacco  "Never   Tobacco Comments    started at age 16 per Yi pt has not smoked the steady timefram of 30 yrs quit  for 3 yeaes at least 2 times     E-Cigarette/Vaping    E-Cigarette Use Never User      E-Cigarette/Vaping Substances    Nicotine No     THC No     CBD No     Flavoring No     Other No     Unknown No      Family History:   Family History   Problem Relation Age of Onset    Heart disease Mother     Diabetes Mother     Heart disease Father     Colon cancer Father     Breast cancer Sister     Thyroid disease Brother     Cancer Brother         Thyroid cancer       Meds/Allergies   all medications and allergies reviewed  No Known Allergies    Objective   First Vitals:   Blood Pressure: 124/60 (06/17/24 1125)  Pulse: 73 (06/17/24 1125)  Temperature: 98.7 °F (37.1 °C) (06/17/24 1125)  Temp Source: Temporal (06/17/24 1125)  Respirations: 16 (06/17/24 1125)  Height: 5' 2\" (157.5 cm) (06/17/24 1125)  Weight - Scale: 80.6 kg (177 lb 12.8 oz) (06/17/24 1125)  SpO2: 97 % (06/17/24 1125)    Current Vitals:   Blood Pressure: 124/60 (06/17/24 1125)  Pulse: 73 (06/17/24 1125)  Temperature: 98.7 °F (37.1 °C) (06/17/24 1125)  Temp Source: Temporal (06/17/24 1125)  Respirations: 16 (06/17/24 1125)  Height: 5' 2\" (157.5 cm) (06/17/24 1125)  Weight - Scale: 80.6 kg (177 lb 12.8 oz) (06/17/24 1125)  SpO2: 97 % (06/17/24 1125)    No intake or output data in the 24 hours ending 06/17/24 1219    Invasive Devices       Peripheral Intravenous Line  Duration             Peripheral IV 06/17/24 Left;Ventral (anterior) Forearm <1 day                    Physical Exam  Vitals reviewed.   HENT:      Head: Normocephalic.      Nose: Nose normal.      Mouth/Throat:      Mouth: Mucous membranes are moist.   Eyes:      General: No scleral icterus.  Cardiovascular:      Rate and Rhythm: Normal rate and regular rhythm.      Pulses: Normal pulses.      Heart sounds: Normal heart sounds.   Pulmonary:      Effort: Pulmonary effort is normal.      Breath " "sounds: Normal breath sounds.   Abdominal:      General: Bowel sounds are normal. There is no distension.      Palpations: Abdomen is soft. There is no mass.      Tenderness: There is no abdominal tenderness. There is no guarding or rebound.      Hernia: No hernia is present.   Musculoskeletal:         General: Normal range of motion.      Cervical back: Normal range of motion.   Skin:     Coloration: Skin is not jaundiced.   Neurological:      General: No focal deficit present.      Mental Status: She is alert.   Psychiatric:         Mood and Affect: Mood normal.         Lab Results: I have personally reviewed pertinent lab results.  , CBC: No results found for: \"WBC\", \"HGB\", \"HCT\", \"MCV\", \"PLT\", \"ADJUSTEDWBC\", \"RBC\", \"MCH\", \"MCHC\", \"RDW\", \"MPV\", \"NRBC\", CMP: No results found for: \"SODIUM\", \"K\", \"CL\", \"CO2\", \"ANIONGAP\", \"BUN\", \"CREATININE\", \"GLUCOSE\", \"CALCIUM\", \"AST\", \"ALT\", \"ALKPHOS\", \"PROT\", \"BILITOT\", \"EGFR\", Coagulation: No results found for: \"PT\", \"INR\", \"APTT\", Urinalysis: No results found for: \"COLORU\", \"CLARITYU\", \"SPECGRAV\", \"PHUR\", \"LEUKOCYTESUR\", \"NITRITE\", \"PROTEINUA\", \"GLUCOSEU\", \"KETONESU\", \"BILIRUBINUR\", \"BLOODU\", Amylase: No results found for: \"AMYLASE\", Lipase: No results found for: \"LIPASE\"  Imaging: I have personally reviewed pertinent reports.   and I have personally reviewed pertinent films in PACS  EKG, Pathology, and Other Studies: I have personally reviewed pertinent reports.   and I have personally reviewed pertinent films in PACS    Code Status: No Order  Advance Directive and Living Will:      Power of :    POLST:      Counseling / Coordination of Care  Total floor / unit time spent today 30 minutes.  Greater than 50% of total time was spent with the patient and / or family counseling and / or coordination of care.  A description of the counseling / coordination of care: 10.      "

## 2024-06-18 ENCOUNTER — TELEPHONE (OUTPATIENT)
Age: 59
End: 2024-06-18

## 2024-06-18 NOTE — TELEPHONE ENCOUNTER
I spoke with Melissa and she said she made an appointment to see her PCP at 2 pm today. She's not sure if it is sinus related or not. I also made her a post op appointment with Dr. Cervantes for June 27th.

## 2024-06-18 NOTE — TELEPHONE ENCOUNTER
Patient of . Has cholecystectomy done yesterday. Doing well. No symptoms. Calling today for redness and puffiness to left side of face and nose that started last evening. Feels warm to touch. Also has headache. No nasal drainage. No fever or chills. Is concerned this is from surgery? Please advise.

## 2024-06-18 NOTE — TELEPHONE ENCOUNTER
Pt calling with sinus sxs.  Left side of her face feels warm and red and puffy.  Pressure in sinuses.  No fever or chills.   Just had her gallbladder removed yesterday, she did call the surgeon who told her to contact PCP, thought it was not related.    Appt made for today at 1:45pm.

## 2024-06-20 PROCEDURE — 88304 TISSUE EXAM BY PATHOLOGIST: CPT | Performed by: STUDENT IN AN ORGANIZED HEALTH CARE EDUCATION/TRAINING PROGRAM

## 2024-06-24 ENCOUNTER — OFFICE VISIT (OUTPATIENT)
Dept: SURGERY | Facility: CLINIC | Age: 59
End: 2024-06-24

## 2024-06-24 VITALS
HEIGHT: 62 IN | SYSTOLIC BLOOD PRESSURE: 128 MMHG | HEART RATE: 77 BPM | DIASTOLIC BLOOD PRESSURE: 80 MMHG | OXYGEN SATURATION: 97 % | BODY MASS INDEX: 33.31 KG/M2 | WEIGHT: 181 LBS | TEMPERATURE: 97.6 F

## 2024-06-24 DIAGNOSIS — K82.4 GALLBLADDER POLYP: Primary | ICD-10-CM

## 2024-06-24 PROCEDURE — 99024 POSTOP FOLLOW-UP VISIT: CPT | Performed by: SURGERY

## 2024-06-24 NOTE — PROGRESS NOTES
"Assessment/Plan:     Diagnoses and all orders for this visit:    Gallbladder polyp     Status post robotic cholecystectomy    Pathology report shows xanthogranulomatous cholecystitis    Uncomplicated postop recovery    Discharge and see as needed    Subjective:      Patient ID: Melissa Dickens is a 58 y.o. female.    HPI  Patient is 1 week status post robotic cholecystectomy performed for a gallbladder polyp.  Patient also had symptoms of biliary colic which seem to have resolved after the surgery.    Patient has no specific complaints except for feeling tired.  She is only 7 days after surgery and this will resolve with time.    The following portions of the patient's history were reviewed and updated as appropriate: allergies, current medications, past family history, past medical history, past social history, past surgical history, and problem list.    Review of Systems    Patient denies any fever, nausea or vomiting.  She has no diarrhea.    Objective:    /80 (BP Location: Left arm, Patient Position: Sitting, Cuff Size: Standard)   Pulse 77   Temp 97.6 °F (36.4 °C) (Tympanic)   Ht 5' 2\" (1.575 m)   Wt 82.1 kg (181 lb)   SpO2 97%   BMI 33.11 kg/m²      Physical Exam    Surgical incisions are clean and healing satisfactorily  "

## 2024-06-28 ENCOUNTER — TELEPHONE (OUTPATIENT)
Age: 59
End: 2024-06-28

## 2024-06-28 NOTE — TELEPHONE ENCOUNTER
Pt called to speak with Beverly, but told her I would need to forward message to Heather.  Pt is returning to work on 07/01/24 .  She said that you may need to go on the [place] Disability website to update /extend the return to work date to 07/01/24. She said her online form id#[id number]. Please call her if you have any questions.

## 2024-08-23 ENCOUNTER — HOSPITAL ENCOUNTER (OUTPATIENT)
Dept: MAMMOGRAPHY | Facility: HOSPITAL | Age: 59
Discharge: HOME | End: 2024-08-23
Attending: FAMILY MEDICINE
Payer: COMMERCIAL

## 2024-08-23 VITALS — BODY MASS INDEX: 33.31 KG/M2 | WEIGHT: 181 LBS | HEIGHT: 62 IN

## 2024-08-23 DIAGNOSIS — Z12.31 BREAST CANCER SCREENING BY MAMMOGRAM: ICD-10-CM

## 2024-08-23 PROCEDURE — 77067 SCR MAMMO BI INCL CAD: CPT

## 2024-08-23 PROCEDURE — 77063 BREAST TOMOSYNTHESIS BI: CPT

## 2024-09-06 ENCOUNTER — OFFICE VISIT (OUTPATIENT)
Dept: FAMILY MEDICINE CLINIC | Facility: CLINIC | Age: 59
End: 2024-09-06
Payer: COMMERCIAL

## 2024-09-06 VITALS
SYSTOLIC BLOOD PRESSURE: 112 MMHG | TEMPERATURE: 97.4 F | RESPIRATION RATE: 16 BRPM | HEIGHT: 62 IN | BODY MASS INDEX: 33.13 KG/M2 | WEIGHT: 180 LBS | OXYGEN SATURATION: 97 % | DIASTOLIC BLOOD PRESSURE: 72 MMHG | HEART RATE: 87 BPM

## 2024-09-06 DIAGNOSIS — J45.21 MILD INTERMITTENT ASTHMATIC BRONCHITIS WITH ACUTE EXACERBATION: Primary | ICD-10-CM

## 2024-09-06 PROBLEM — J45.909 ASTHMATIC BRONCHITIS: Status: ACTIVE | Noted: 2024-09-06

## 2024-09-06 LAB
SARS-COV-2 AG UPPER RESP QL IA: NEGATIVE
VALID CONTROL: NORMAL

## 2024-09-06 PROCEDURE — 94640 AIRWAY INHALATION TREATMENT: CPT | Performed by: FAMILY MEDICINE

## 2024-09-06 PROCEDURE — 99214 OFFICE O/P EST MOD 30 MIN: CPT | Performed by: FAMILY MEDICINE

## 2024-09-06 PROCEDURE — 87811 SARS-COV-2 COVID19 W/OPTIC: CPT | Performed by: FAMILY MEDICINE

## 2024-09-06 RX ORDER — OFLOXACIN 3 MG/ML
SOLUTION/ DROPS OPHTHALMIC
COMMUNITY
Start: 2024-07-13 | End: 2024-09-06

## 2024-09-06 RX ORDER — ALBUTEROL SULFATE 90 UG/1
2 AEROSOL, METERED RESPIRATORY (INHALATION) EVERY 6 HOURS PRN
COMMUNITY
Start: 2024-09-03 | End: 2024-09-06

## 2024-09-06 RX ORDER — BENZONATATE 200 MG/1
200 CAPSULE ORAL 3 TIMES DAILY PRN
COMMUNITY
Start: 2024-09-03 | End: 2024-09-10

## 2024-09-06 RX ORDER — PREDNISONE 20 MG/1
40 TABLET ORAL DAILY
Qty: 14 TABLET | Refills: 0 | Status: SHIPPED | OUTPATIENT
Start: 2024-09-06 | End: 2024-09-13

## 2024-09-06 RX ORDER — ALBUTEROL SULFATE 90 UG/1
2 AEROSOL, METERED RESPIRATORY (INHALATION) EVERY 4 HOURS PRN
Qty: 18 G | Refills: 3 | Status: SHIPPED | OUTPATIENT
Start: 2024-09-06 | End: 2024-09-20

## 2024-09-06 RX ORDER — AZITHROMYCIN 250 MG/1
TABLET, FILM COATED ORAL
Qty: 6 TABLET | Refills: 0 | Status: SHIPPED | OUTPATIENT
Start: 2024-09-06 | End: 2024-09-11

## 2024-09-06 RX ORDER — FLUTICASONE PROPIONATE 50 MCG
2 SPRAY, SUSPENSION (ML) NASAL DAILY
COMMUNITY
Start: 2024-09-03

## 2024-09-06 RX ORDER — ALBUTEROL SULFATE 0.83 MG/ML
2.5 SOLUTION RESPIRATORY (INHALATION) ONCE
Status: COMPLETED | OUTPATIENT
Start: 2024-09-06 | End: 2024-09-06

## 2024-09-06 RX ADMIN — ALBUTEROL SULFATE 2.5 MG: 0.83 SOLUTION RESPIRATORY (INHALATION) at 14:21

## 2024-09-06 NOTE — PROGRESS NOTES
"Ambulatory Visit  Name: Melissa Dickens      : 1965      MRN: [id number]  Encounter Provider: Dori Velasquez MD  Encounter Date: 2024   Encounter department: [facility] MEDICINE    Assessment & Plan   1. Mild intermittent asthmatic bronchitis with acute exacerbation  Assessment & Plan:   Wheezing now give albuterol nebulizer Pt with URI for 5 days  check covid : negative ; start azithromycin and prednisone  wheezing improved after nebulizer x 1  to ER if worsens  Orders:  -     predniSONE 20 mg tablet; Take 2 tablets (40 mg total) by mouth daily for 7 days  -     azithromycin (ZITHROMAX) 250 mg tablet; Take 2 tablets today then 1 tablet daily x 4 days  -     albuterol inhalation solution 2.5 mg  -     POCT Rapid Covid Ag       History of Present Illness     Pt here for URI for 5 days went to [facility] [place] early on covid negative had sore throat resolved but cough and wheezing now no fever no sob     Cough  Associated symptoms include ear pain and wheezing. Pertinent negatives include no chest pain, chills, fever, headaches, myalgias, rash or sore throat.   Earache   Associated symptoms include coughing. Pertinent negatives include no headaches, rash or sore throat.       Review of Systems   Constitutional:  Negative for appetite change, chills, fatigue and fever.   HENT:  Positive for ear pain. Negative for congestion, sinus pressure, sinus pain, sneezing and sore throat.    Eyes:  Negative for discharge.   Respiratory:  Positive for cough and wheezing.    Cardiovascular:  Negative for chest pain.   Musculoskeletal:  Negative for arthralgias and myalgias.   Skin:  Negative for rash.   Neurological:  Negative for headaches.       Objective     /72 (BP Location: Left arm, Patient Position: Sitting, Cuff Size: Standard)   Pulse 87   Temp (!) 97.4 °F (36.3 °C) (Tympanic)   Resp 16   Ht 5' 2\" (1.575 m)   Wt 81.6 kg (180 lb)   SpO2 97%   BMI 32.92 kg/m²     Physical Exam  Vitals and " nursing note reviewed.   Constitutional:       General: She is not in acute distress.     Appearance: Normal appearance. She is well-developed. She is not ill-appearing.   HENT:      Head: Normocephalic.      Right Ear: Tympanic membrane and ear canal normal.      Left Ear: Tympanic membrane and ear canal normal.      Nose: Mucosal edema present. No congestion or rhinorrhea.      Right Sinus: Maxillary sinus tenderness and frontal sinus tenderness present.      Left Sinus: Maxillary sinus tenderness and frontal sinus tenderness present.      Mouth/Throat:      Mouth: Mucous membranes are moist.      Pharynx: Oropharynx is clear. No oropharyngeal exudate or posterior oropharyngeal erythema.   Eyes:      Extraocular Movements: Extraocular movements intact.      Conjunctiva/sclera: Conjunctivae normal.      Pupils: Pupils are equal, round, and reactive to light.   Cardiovascular:      Rate and Rhythm: Normal rate and regular rhythm.      Heart sounds: Normal heart sounds.   Pulmonary:      Effort: Pulmonary effort is normal. No respiratory distress.      Breath sounds: Wheezing present. No rhonchi or rales.   Musculoskeletal:      Cervical back: Normal range of motion and neck supple.   Lymphadenopathy:      Cervical: No cervical adenopathy.   Neurological:      General: No focal deficit present.      Mental Status: She is alert and oriented to person, place, and time.   Psychiatric:         Mood and Affect: Mood normal.       Administrative Statements

## 2024-09-06 NOTE — ASSESSMENT & PLAN NOTE
Wheezing now give albuterol nebulizer Pt with URI for 5 days  check covid : negative ; start azithromycin and prednisone  wheezing improved after nebulizer x 1  to ER if worsens

## 2024-09-12 ENCOUNTER — APPOINTMENT (EMERGENCY)
Dept: RADIOLOGY | Facility: HOSPITAL | Age: 59
End: 2024-09-12
Payer: COMMERCIAL

## 2024-09-12 ENCOUNTER — HOSPITAL ENCOUNTER (EMERGENCY)
Facility: HOSPITAL | Age: 59
Discharge: HOME | End: 2024-09-12
Attending: EMERGENCY MEDICINE
Payer: COMMERCIAL

## 2024-09-12 ENCOUNTER — TELEPHONE (OUTPATIENT)
Age: 59
End: 2024-09-12

## 2024-09-12 VITALS
BODY MASS INDEX: 33.67 KG/M2 | SYSTOLIC BLOOD PRESSURE: 142 MMHG | HEART RATE: 79 BPM | OXYGEN SATURATION: 97 % | WEIGHT: 184.08 LBS | DIASTOLIC BLOOD PRESSURE: 63 MMHG | RESPIRATION RATE: 18 BRPM | TEMPERATURE: 97.9 F

## 2024-09-12 DIAGNOSIS — R07.89 CHEST WALL PAIN: Primary | ICD-10-CM

## 2024-09-12 LAB
ALBUMIN SERPL BCG-MCNC: 3.8 G/DL (ref 3.5–5)
ALP SERPL-CCNC: 76 U/L (ref 34–104)
ALT SERPL W P-5'-P-CCNC: 21 U/L (ref 7–52)
ANION GAP SERPL CALCULATED.3IONS-SCNC: 9 MMOL/L (ref 4–13)
AST SERPL W P-5'-P-CCNC: 17 U/L (ref 13–39)
BASOPHILS # BLD AUTO: 0.07 THOUSANDS/ΜL (ref 0–0.1)
BASOPHILS NFR BLD AUTO: 1 % (ref 0–1)
BILIRUB SERPL-MCNC: 0.51 MG/DL (ref 0.2–1)
BUN SERPL-MCNC: 16 MG/DL (ref 5–25)
CALCIUM SERPL-MCNC: 8.7 MG/DL (ref 8.4–10.2)
CARDIAC TROPONIN I PNL SERPL HS: 3 NG/L
CHLORIDE SERPL-SCNC: 103 MMOL/L (ref 96–108)
CO2 SERPL-SCNC: 25 MMOL/L (ref 21–32)
CREAT SERPL-MCNC: 1 MG/DL (ref 0.6–1.3)
EOSINOPHIL # BLD AUTO: 0.11 THOUSAND/ΜL (ref 0–0.61)
EOSINOPHIL NFR BLD AUTO: 1 % (ref 0–6)
ERYTHROCYTE [DISTWIDTH] IN BLOOD BY AUTOMATED COUNT: 12.2 % (ref 11.6–15.1)
GFR SERPL CREATININE-BSD FRML MDRD: 60 ML/MIN/1.73SQ M
GLUCOSE SERPL-MCNC: 87 MG/DL (ref 65–140)
HCT VFR BLD AUTO: 40.5 % (ref 34.8–46.1)
HGB BLD-MCNC: 13.5 G/DL (ref 11.5–15.4)
IMM GRANULOCYTES # BLD AUTO: 0.11 THOUSAND/UL (ref 0–0.2)
IMM GRANULOCYTES NFR BLD AUTO: 1 % (ref 0–2)
LYMPHOCYTES # BLD AUTO: 4.1 THOUSANDS/ΜL (ref 0.6–4.47)
LYMPHOCYTES NFR BLD AUTO: 29 % (ref 14–44)
MCH RBC QN AUTO: 32.2 PG (ref 26.8–34.3)
MCHC RBC AUTO-ENTMCNC: 33.3 G/DL (ref 31.4–37.4)
MCV RBC AUTO: 97 FL (ref 82–98)
MONOCYTES # BLD AUTO: 0.7 THOUSAND/ΜL (ref 0.17–1.22)
MONOCYTES NFR BLD AUTO: 5 % (ref 4–12)
NEUTROPHILS # BLD AUTO: 8.92 THOUSANDS/ΜL (ref 1.85–7.62)
NEUTS SEG NFR BLD AUTO: 63 % (ref 43–75)
NRBC BLD AUTO-RTO: 0 /100 WBCS
PLATELET # BLD AUTO: 334 THOUSANDS/UL (ref 149–390)
PMV BLD AUTO: 9.7 FL (ref 8.9–12.7)
POTASSIUM SERPL-SCNC: 4.1 MMOL/L (ref 3.5–5.3)
PROT SERPL-MCNC: 6.3 G/DL (ref 6.4–8.4)
RBC # BLD AUTO: 4.19 MILLION/UL (ref 3.81–5.12)
SODIUM SERPL-SCNC: 137 MMOL/L (ref 135–147)
WBC # BLD AUTO: 14.01 THOUSAND/UL (ref 4.31–10.16)

## 2024-09-12 PROCEDURE — 85025 COMPLETE CBC W/AUTO DIFF WBC: CPT | Performed by: EMERGENCY MEDICINE

## 2024-09-12 PROCEDURE — 93005 ELECTROCARDIOGRAM TRACING: CPT

## 2024-09-12 PROCEDURE — 80053 COMPREHEN METABOLIC PANEL: CPT | Performed by: EMERGENCY MEDICINE

## 2024-09-12 PROCEDURE — 36415 COLL VENOUS BLD VENIPUNCTURE: CPT | Performed by: EMERGENCY MEDICINE

## 2024-09-12 PROCEDURE — 84484 ASSAY OF TROPONIN QUANT: CPT | Performed by: EMERGENCY MEDICINE

## 2024-09-12 PROCEDURE — 99285 EMERGENCY DEPT VISIT HI MDM: CPT | Performed by: EMERGENCY MEDICINE

## 2024-09-12 PROCEDURE — 71045 X-RAY EXAM CHEST 1 VIEW: CPT

## 2024-09-12 PROCEDURE — 99285 EMERGENCY DEPT VISIT HI MDM: CPT

## 2024-09-12 RX ORDER — ACETAMINOPHEN 325 MG/1
650 TABLET ORAL ONCE
Status: COMPLETED | OUTPATIENT
Start: 2024-09-12 | End: 2024-09-12

## 2024-09-12 RX ADMIN — ACETAMINOPHEN 650 MG: 325 TABLET, FILM COATED ORAL at 08:11

## 2024-09-12 NOTE — ED PROVIDER NOTES
1. Chest wall pain      ED Disposition       ED Disposition   Discharge    Condition   Stable    Date/Time   u Sep 12, 2024  9:16 AM    Comment   Melissa Dickens discharge to home/self care.                   Assessment & Plan       Medical Decision Making  59-year-old female presented to the emergency department for evaluation of chest pain.  On arrival the patient was awake, alert, oriented and in no acute distress.  Initial vital signs unremarkable.  Physical exam with a left-sided chest wall tenderness to palpation.  Physical exam was otherwise benign.  EKG was ordered to evaluate for acute ischemia/arrhythmia.  On my interpretation EKG with a sinus rhythm with no acute ischemic changes.  Chest x-ray was ordered to evaluate for acute cardiopulmonary process including but not limited to pneumonia, pneumothorax, edema, effusion.  Chest x-ray on my interpretation with no acute findings.  Blood work showed the patient had a mild leukocytosis likely secondary to the recent steroid use.  Troponin within normal limits.  All diagnostic studies were discussed with the patient in detail.  She is appropriate for discharge.  Recommendation was made for the patient to follow-up with her PCP for continued symptoms.  Return precautions were discussed.    Patient agrees with the plan for discharge and feels comfortable to go home with proper f/u. Advised to return for worsening or additional problems. Diagnostic tests were reviewed and questions answered. Diagnosis, care plan and treatment options were discussed. The patient understands instructions and will follow up as directed.        Amount and/or Complexity of Data Reviewed  Labs: ordered. Decision-making details documented in ED Course.  Radiology: ordered and independent interpretation performed.    Risk  OTC drugs.          HEART Risk Score      Flowsheet Row Most Recent Value   Heart Score Risk Calculator    History 0 Filed at: 09/12/2024 1000   ECG 1 Filed at:  09/12/2024 1000   Age 1 Filed at: 09/12/2024 1000   Risk Factors 1 Filed at: 09/12/2024 1000   Troponin 0 Filed at: 09/12/2024 1000   HEART Score 3 Filed at: 09/12/2024 1000               ED Course as of 09/12/24 1305   Thu Sep 12, 2024   0847 WBC(!): 14.01  On steroids   0908 hs TnI 0hr: 3       Medications   acetaminophen (TYLENOL) tablet 650 mg (650 mg Oral Given 9/12/24 0811)       History of Present Illness       59-year-old female presented to the emergency department for evaluation of chest pain.  Patient reports worsening pain to her left lateral chest that she describes as sharp.  She states that the pain is nonradiating and nonexertional.  Patient reports that earlier this month she was diagnosed with bronchitis.  States that she just finished a course of steroids and antibiotics.  She has not been taking any medications to treat her pain.  No fevers, chills, nausea, vomiting, diarrhea, diaphoresis, shortness of breath, lower leg pain/swelling, recent travel or localized numbness, tingling or weakness.            Review of Systems   Constitutional:  Negative for chills and fever.   HENT:  Negative for ear pain and sore throat.    Eyes:  Negative for pain and visual disturbance.   Respiratory:  Negative for cough and shortness of breath.    Cardiovascular:  Positive for chest pain. Negative for palpitations.   Gastrointestinal:  Negative for abdominal pain and vomiting.   Genitourinary:  Negative for dysuria and hematuria.   Musculoskeletal:  Negative for arthralgias and back pain.   Skin:  Negative for color change and rash.   Neurological:  Negative for seizures and syncope.   All other systems reviewed and are negative.          Objective     ED Triage Vitals [09/12/24 0736]   Temperature Pulse Blood Pressure Respirations SpO2 Patient Position - Orthostatic VS   97.9 °F (36.6 °C) 79 142/63 18 97 % Lying      Temp Source Heart Rate Source BP Location FiO2 (%) Pain Score    Oral Monitor Left arm -- 5         Physical Exam  Vitals and nursing note reviewed. Exam conducted with a chaperone present.   Constitutional:       General: She is not in acute distress.     Appearance: She is well-developed. She is obese.   HENT:      Head: Normocephalic and atraumatic.   Eyes:      Conjunctiva/sclera: Conjunctivae normal.   Cardiovascular:      Rate and Rhythm: Normal rate and regular rhythm.      Heart sounds: No murmur heard.  Pulmonary:      Effort: Pulmonary effort is normal. No respiratory distress.      Breath sounds: Normal breath sounds.   Chest:       Abdominal:      Palpations: Abdomen is soft.      Tenderness: There is no abdominal tenderness.   Musculoskeletal:         General: No swelling.      Cervical back: Neck supple.   Skin:     General: Skin is warm and dry.      Capillary Refill: Capillary refill takes less than 2 seconds.   Neurological:      Mental Status: She is alert.   Psychiatric:         Mood and Affect: Mood normal.         Labs Reviewed   CBC AND DIFFERENTIAL - Abnormal       Result Value    WBC 14.01 (*)     RBC 4.19      Hemoglobin 13.5      Hematocrit 40.5      MCV 97      MCH 32.2      MCHC 33.3      RDW 12.2      MPV 9.7      Platelets 334      nRBC 0      Segmented % 63      Immature Grans % 1      Lymphocytes % 29      Monocytes % 5      Eosinophils Relative 1      Basophils Relative 1      Absolute Neutrophils 8.92 (*)     Absolute Immature Grans 0.11      Absolute Lymphocytes 4.10      Absolute Monocytes 0.70      Eosinophils Absolute 0.11      Basophils Absolute 0.07     COMPREHENSIVE METABOLIC PANEL - Abnormal    Sodium 137      Potassium 4.1      Chloride 103      CO2 25      ANION GAP 9      BUN 16      Creatinine 1.00      Glucose 87      Calcium 8.7      AST 17      ALT 21      Alkaline Phosphatase 76      Total Protein 6.3 (*)     Albumin 3.8      Total Bilirubin 0.51      eGFR 60      Narrative:     National Kidney Disease Foundation guidelines for Chronic Kidney Disease (CKD):      Stage 1 with normal or high GFR (GFR > 90 mL/min/1.73 square meters)    Stage 2 Mild CKD (GFR = 60-89 mL/min/1.73 square meters)    Stage 3A Moderate CKD (GFR = 45-59 mL/min/1.73 square meters)    Stage 3B Moderate CKD (GFR = 30-44 mL/min/1.73 square meters)    Stage 4 Severe CKD (GFR = 15-29 mL/min/1.73 square meters)    Stage 5 End Stage CKD (GFR <15 mL/min/1.73 square meters)  Note: GFR calculation is accurate only with a steady state creatinine   HS TROPONIN I 0HR - Normal    hs TnI 0hr 3       XR chest 1 view portable   ED Interpretation by Javier De Los Santos MD (09/12 0848)   No pneumothorax.  No focal consolidation.  No significant edema or effusion.          ECG 12 Lead Documentation Only    Date/Time: 9/12/2024 7:41 AM    Performed by: Javier De Los Santos MD  Authorized by: Javier De Los Santos MD    ECG reviewed by me, the ED Provider: yes    Patient location:  ED  Previous ECG:     Previous ECG:  Compared to current    Similarity:  No change    Comparison to cardiac monitor: Yes    Interpretation:     Interpretation: non-specific    Rate:     ECG rate assessment: normal    Rhythm:     Rhythm: sinus rhythm    Ectopy:     Ectopy: none    QRS:     QRS axis:  Normal  Conduction:     Conduction: normal    ST segments:     ST segments:  Normal  T waves:     T waves: normal           Javier De Los Santos MD  09/12/24 4810

## 2024-09-12 NOTE — TELEPHONE ENCOUNTER
Pt called to say she is still experiencing a productive cough. Pt also c/o left sided pain under her arm and next to her breast. She did go to the ED earlier today. The pt finished the abx yesterday and prednisone today. Does she need another round of abx? Or something else? She really thought she would be feeling better by now. Please advise.

## 2024-09-12 NOTE — Clinical Note
Melissa Dickens was seen and treated in our emergency department on 9/12/2024.    No restrictions            Diagnosis: chest pain    Melissa  may return to work on return date.    She may return on this date: 09/13/2024         If you have any questions or concerns, please don't hesitate to call.      Javier De Los Santos MD    ______________________________           _______________          _______________  Hospital Representative                              Date                                Time

## 2024-09-12 NOTE — TELEPHONE ENCOUNTER
Too soon to even see what was done  in ER no notes or xray result I imagine the doctor would have given her meds if needed call pt to find out

## 2024-09-13 ENCOUNTER — NURSE TRIAGE (OUTPATIENT)
Dept: OTHER | Facility: OTHER | Age: 59
End: 2024-09-13

## 2024-09-13 ENCOUNTER — OFFICE VISIT (OUTPATIENT)
Dept: FAMILY MEDICINE CLINIC | Facility: CLINIC | Age: 59
End: 2024-09-13
Payer: COMMERCIAL

## 2024-09-13 VITALS
HEART RATE: 88 BPM | RESPIRATION RATE: 16 BRPM | HEIGHT: 62 IN | WEIGHT: 185 LBS | BODY MASS INDEX: 34.04 KG/M2 | DIASTOLIC BLOOD PRESSURE: 82 MMHG | SYSTOLIC BLOOD PRESSURE: 122 MMHG | TEMPERATURE: 97.3 F | OXYGEN SATURATION: 96 %

## 2024-09-13 DIAGNOSIS — M79.622 AXILLARY PAIN, LEFT: Primary | ICD-10-CM

## 2024-09-13 LAB
ATRIAL RATE: 68 BPM
P AXIS: 61 DEGREES
PR INTERVAL: 148 MS
QRS AXIS: 7 DEGREES
QRSD INTERVAL: 72 MS
QT INTERVAL: 394 MS
QTC INTERVAL: 418 MS
T WAVE AXIS: 40 DEGREES
VENTRICULAR RATE: 68 BPM

## 2024-09-13 PROCEDURE — 99213 OFFICE O/P EST LOW 20 MIN: CPT

## 2024-09-13 PROCEDURE — 93010 ELECTROCARDIOGRAM REPORT: CPT | Performed by: INTERNAL MEDICINE

## 2024-09-13 NOTE — PROGRESS NOTES
Ambulatory Visit  Name: Melissa Dickens      : 1965      MRN: [id number]  Encounter Provider: HANK Sanchez  Encounter Date: 2024   Encounter department: [facility] MEDICINE    Assessment & Plan  Axillary pain, left  Pt reports bronchitis dx with persistent cough since 24. Pt has had MSK chest pain secondary to forceful coughing was seen in ED 24 had normal chest  xary. Reports pain and swelling in L axilla pain radiates in chest wall below L breast. Pt completed zpak yesterday 24 has been taking benzonatate capsules for cough. Discuss enlarge lymph node and MSK pain, pt had normal mammogram 24. WIll check U/S Axilla and recommend NSAID prn and cool compress to help with swelling.  Orders:    US axilla; Future       History of Present Illness     Pt presents with c/o pain under L arm and left chest wall. Reports was dx with bronchitis and has had paroxysmal coughing, was seen in ED 24 secondary to severe chest pain. CXR was negative cardiac work-up negative. Pt has been taking steroids, zpak and benzonatate capsules. Completed zpak and steroids yesterday. Pt has been taking ibuprofen for pain reports pain is better today however has swelling under L arm. Discussed enlarged lymph node, pt had normal mammogram 24.        History obtained from : patient  Review of Systems   Constitutional:  Negative for activity change, chills, fatigue and fever.   HENT:  Negative for congestion, ear discharge, ear pain, sinus pressure, sinus pain and sore throat.    Eyes:  Negative for photophobia.   Respiratory:  Positive for cough and chest tightness. Negative for shortness of breath and wheezing.    Cardiovascular:  Positive for chest pain. Negative for palpitations and leg swelling.   Gastrointestinal:  Negative for abdominal pain, nausea and vomiting.   Genitourinary:  Negative for difficulty urinating.   Musculoskeletal:  Positive for arthralgias.    Skin:  Negative for color change.   Allergic/Immunologic: Negative for environmental allergies.   Neurological:  Negative for weakness and headaches.   Hematological:  Positive for adenopathy.   Psychiatric/Behavioral:  Negative for agitation, sleep disturbance and suicidal ideas. The patient is not nervous/anxious.      Current Outpatient Medications on File Prior to Visit   Medication Sig Dispense Refill    albuterol (PROVENTIL HFA,VENTOLIN HFA) 90 mcg/act inhaler Inhale 2 puffs every 4 (four) hours as needed for wheezing for up to 14 days 18 g 3    buPROPion (WELLBUTRIN XL) 150 mg 24 hr tablet Take 1 tablet (150 mg total) by mouth every morning 90 tablet 1    escitalopram (LEXAPRO) 5 mg tablet Take 1 tablet (5 mg total) by mouth daily 90 tablet 1    ezetimibe (ZETIA) 10 mg tablet Take 1 tablet (10 mg total) by mouth daily 90 tablet 1    fluticasone (FLONASE) 50 mcg/act nasal spray 2 sprays into each nostril daily      pravastatin (PRAVACHOL) 20 mg tablet Take 1 tablet (20 mg total) by mouth daily 90 tablet 1    predniSONE 20 mg tablet Take 2 tablets (40 mg total) by mouth daily for 7 days (Patient not taking: Reported on 9/13/2024) 14 tablet 0     No current facility-administered medications on file prior to visit.      Social History     Tobacco Use    Smoking status: Every Day     Current packs/day: 0.75     Average packs/day: 0.8 packs/day for 44.0 years (33.0 ttl pk-yrs)     Types: Cigarettes     Start date: 9/3/1980    Smokeless tobacco: Never    Tobacco comments:     started at age 16 per [place] pt has not smoked the steady timefram of 30 yrs quit  for 3 yeaes at least 2 times   Vaping Use    Vaping status: Never Used   Substance and Sexual Activity    Alcohol use: Not Currently     Comment: rarely    Drug use: Never    Sexual activity: Yes     Partners: Male     Birth control/protection: Post-menopausal, Female Sterilization         Objective     /82 (BP Location: Left arm, Patient Position:  "Sitting, Cuff Size: Standard)   Pulse 88   Temp (!) 97.3 °F (36.3 °C) (Tympanic)   Resp 16   Ht 5' 2\" (1.575 m)   Wt 83.9 kg (185 lb)   SpO2 96%   BMI 33.84 kg/m²     Physical Exam  Vitals and nursing note reviewed.   Constitutional:       General: She is not in acute distress.     Appearance: Normal appearance. She is obese. She is not ill-appearing, toxic-appearing or diaphoretic.   HENT:      Head: Normocephalic and atraumatic.      Nose: Nose normal. No congestion.   Eyes:      Pupils: Pupils are equal, round, and reactive to light.   Cardiovascular:      Rate and Rhythm: Normal rate and regular rhythm.      Pulses: Normal pulses.      Heart sounds: Normal heart sounds.   Pulmonary:      Effort: Pulmonary effort is normal. No respiratory distress.      Breath sounds: Normal breath sounds. No stridor. No wheezing, rhonchi or rales.   Chest:      Chest wall: Tenderness (L costo vertebral) present. No mass, lacerations, deformity, swelling, crepitus or edema. There is no dullness to percussion.   Breasts:     Breasts are symmetrical.   Abdominal:      General: Bowel sounds are normal.   Musculoskeletal:         General: Tenderness (L chest wall and axilla) present. Normal range of motion.   Lymphadenopathy:      Upper Body:      Left upper body: Axillary adenopathy present.   Skin:     General: Skin is warm.      Coloration: Skin is not jaundiced.      Findings: No bruising, erythema, lesion or rash.   Neurological:      General: No focal deficit present.      Mental Status: She is alert and oriented to person, place, and time.   Psychiatric:         Mood and Affect: Mood normal.         Behavior: Behavior normal.         "

## 2024-09-13 NOTE — TELEPHONE ENCOUNTER
"Regarding: same day appointment  ----- Message from Geraldine BAÑUELOS sent at 9/13/2024  7:20 AM EDT -----  \" I have a cough and around my left breast and arm it hurts. Can I be seen today? \"    "

## 2024-09-13 NOTE — TELEPHONE ENCOUNTER
"Reason for Disposition  • [1] Breast pain AND [2] cause is not known    Answer Assessment - Initial Assessment Questions  1. SYMPTOM: \"What's the main symptom you're concerned about?\"  (e.g., lump, nipple discharge, pain, rash )      pain  2. LOCATION: \"Where is the pain located?\"      Under left arm and left breast    3. ONSET: \"When did pain  start?\"      2 days ago    4. PRIOR HISTORY: \"Do you have any history of prior problems with your breasts?\" (e.g., breast cancer, breast implant, fibrocystic breast disease)      No    5. CAUSE: \"What do you think is causing this symptom?\"      Unsure; patient was seen in ED yesterday for issue and also recently had a mammogram    6. OTHER SYMPTOMS: \"Do you have any other symptoms?\" (e.g., fever, breast pain, nipple discharge, redness or rash)      cough    Protocols used: Breast Symptoms-Adult-AH    "

## 2024-09-20 PROBLEM — R10.12 LEFT UPPER QUADRANT ABDOMINAL PAIN: Status: RESOLVED | Noted: 2022-05-27 | Resolved: 2024-09-20

## 2024-09-20 PROBLEM — E55.9 VITAMIN D DEFICIENCY: Status: RESOLVED | Noted: 2022-09-16 | Resolved: 2024-09-20

## 2024-09-20 PROBLEM — K82.4 GALLBLADDER POLYP: Status: RESOLVED | Noted: 2024-06-17 | Resolved: 2024-09-20

## 2024-09-20 PROBLEM — U07.1 COVID-19: Status: RESOLVED | Noted: 2023-11-15 | Resolved: 2024-09-20

## 2024-10-09 DIAGNOSIS — F32.1 MODERATE MAJOR DEPRESSION, SINGLE EPISODE (HCC): ICD-10-CM

## 2024-10-09 DIAGNOSIS — F17.210 CIGARETTE SMOKER: ICD-10-CM

## 2024-10-10 ENCOUNTER — TELEPHONE (OUTPATIENT)
Age: 59
End: 2024-10-10

## 2024-10-10 DIAGNOSIS — E78.5 DYSLIPIDEMIA: Primary | ICD-10-CM

## 2024-10-10 RX ORDER — PRAVASTATIN SODIUM 20 MG
20 TABLET ORAL
Qty: 100 TABLET | Refills: 3 | Status: SHIPPED | OUTPATIENT
Start: 2024-10-10

## 2024-10-10 RX ORDER — BUPROPION HYDROCHLORIDE 150 MG/1
150 TABLET ORAL EVERY MORNING
Qty: 90 TABLET | Refills: 1 | Status: SHIPPED | OUTPATIENT
Start: 2024-10-10

## 2024-10-10 RX ORDER — ESCITALOPRAM OXALATE 5 MG/1
5 TABLET ORAL DAILY
Qty: 90 TABLET | Refills: 1 | Status: SHIPPED | OUTPATIENT
Start: 2024-10-10

## 2024-10-10 NOTE — TELEPHONE ENCOUNTER
Reason for call:   [x] Refill   [] Prior Auth  [] Other:     Office:   [x] PCP/Provider - Dr Velasquez  [] Specialty/Provider -     Medication: pravastatin     Dose/Frequency: 20 mg take daily- according to d/c list in chart, pt was unsure of dose but said she thinks it's the lowest dose there is  Quantity: 90    Pharmacy: [facility]    Does the patient have enough for 3 days?   [x] Yes   [] No - Send as HP to POD

## 2024-10-30 ENCOUNTER — TELEPHONE (OUTPATIENT)
Dept: GASTROENTEROLOGY | Facility: CLINIC | Age: 59
End: 2024-10-30

## 2024-10-30 DIAGNOSIS — Z86.0109 PERSONAL HISTORY OF OTHER COLON POLYPS: Primary | ICD-10-CM

## 2024-10-30 RX ORDER — SODIUM, POTASSIUM,MAG SULFATES 17.5-3.13G
177 SOLUTION, RECONSTITUTED, ORAL ORAL ONCE
Qty: 254 ML | Refills: 0 | Status: SHIPPED | OUTPATIENT
Start: 2024-10-30 | End: 2024-11-08

## 2024-10-30 NOTE — TELEPHONE ENCOUNTER
Scheduled date of colonoscopy (as of today): 2/28/24  Physician performing colonoscopy: Dr Rob  Location of colonoscopy:   Bowel prep reviewed with patient: pt requesting Suprep   Instructions reviewed with patient by: once approved will mail pt the Suprep instructions   Clearances: n/a

## 2024-10-30 NOTE — TELEPHONE ENCOUNTER
Pt is due for a colonoscopy with Dr Rob for hx of IBD, hx of colonic polyps, family hx of colon cancer, father.       10/30/24  Screened by: Kitty Gonzalez MA    Referring Provider Dr Rob    Pre- Screening:     There is no height or weight on file to calculate BMI.  Has patient been referred for a routine screening Colonoscopy? yes  Is the patient between 45-75 years old? yes      Previous Colonoscopy yes   If yes:    Date: recall     Facility:     Reason:       SCHEDULING STAFF: If the patient is between 45yrs-49yrs, please advise patient to confirm benefits/coverage with their insurance company for a routine screening colonoscopy, some insurance carriers will only cover at 50yrs or older. If the patient is over 75years old, please schedule an office visit.     Does the patient want to see a Gastroenterologist prior to their procedure OR are they having any GI symptoms? no    Has the patient been hospitalized or had abdominal surgery in the past 6 months? no    Does the patient use supplemental oxygen? no    Does the patient take Coumadin, Lovenox, Plavix, Elliquis, Xarelto, or other blood thinning medication? no    Has the patient had a stroke, cardiac event, or stent placed in the past year? no    SCHEDULING STAFF: If patient answers NO to above questions, then schedule procedure. If patient answers YES to above questions, then schedule office appointment.     If patient is between 45yrs - 49yrs, please advise patient that we will have to confirm benefits & coverage with their insurance company for a routine screening colonoscopy.

## 2024-11-08 ENCOUNTER — OFFICE VISIT (OUTPATIENT)
Dept: FAMILY MEDICINE CLINIC | Facility: CLINIC | Age: 59
End: 2024-11-08
Payer: COMMERCIAL

## 2024-11-08 VITALS
RESPIRATION RATE: 16 BRPM | BODY MASS INDEX: 33.68 KG/M2 | HEIGHT: 62 IN | HEART RATE: 88 BPM | SYSTOLIC BLOOD PRESSURE: 122 MMHG | DIASTOLIC BLOOD PRESSURE: 80 MMHG | WEIGHT: 183 LBS | TEMPERATURE: 98 F | OXYGEN SATURATION: 96 %

## 2024-11-08 DIAGNOSIS — E04.2 MULTIPLE THYROID NODULES: ICD-10-CM

## 2024-11-08 DIAGNOSIS — Z86.39 HISTORY OF THYROID DISORDER: ICD-10-CM

## 2024-11-08 DIAGNOSIS — Z00.00 ANNUAL PHYSICAL EXAM: Primary | ICD-10-CM

## 2024-11-08 DIAGNOSIS — Z01.84 IMMUNITY STATUS TESTING: ICD-10-CM

## 2024-11-08 DIAGNOSIS — E66.811 CLASS 1 OBESITY DUE TO EXCESS CALORIES WITH SERIOUS COMORBIDITY AND BODY MASS INDEX (BMI) OF 33.0 TO 33.9 IN ADULT: ICD-10-CM

## 2024-11-08 DIAGNOSIS — F32.1 MODERATE MAJOR DEPRESSION, SINGLE EPISODE (HCC): ICD-10-CM

## 2024-11-08 DIAGNOSIS — E78.5 DYSLIPIDEMIA: ICD-10-CM

## 2024-11-08 DIAGNOSIS — I10 ESSENTIAL HYPERTENSION: ICD-10-CM

## 2024-11-08 DIAGNOSIS — B37.2 CANDIDA INFECTION OF FLEXURAL SKIN: ICD-10-CM

## 2024-11-08 DIAGNOSIS — H81.10 BENIGN PAROXYSMAL POSITIONAL VERTIGO, UNSPECIFIED LATERALITY: ICD-10-CM

## 2024-11-08 DIAGNOSIS — K21.9 GASTROESOPHAGEAL REFLUX DISEASE WITHOUT ESOPHAGITIS: ICD-10-CM

## 2024-11-08 DIAGNOSIS — I35.0 NONRHEUMATIC AORTIC VALVE STENOSIS: ICD-10-CM

## 2024-11-08 DIAGNOSIS — K51.80 OTHER ULCERATIVE COLITIS WITHOUT COMPLICATION (HCC): ICD-10-CM

## 2024-11-08 DIAGNOSIS — E66.09 CLASS 1 OBESITY DUE TO EXCESS CALORIES WITH SERIOUS COMORBIDITY AND BODY MASS INDEX (BMI) OF 33.0 TO 33.9 IN ADULT: ICD-10-CM

## 2024-11-08 DIAGNOSIS — I34.0 MILD MITRAL REGURGITATION: ICD-10-CM

## 2024-11-08 DIAGNOSIS — F17.210 CIGARETTE SMOKER: ICD-10-CM

## 2024-11-08 PROBLEM — M65.342 TRIGGER FINGER, LEFT RING FINGER: Status: RESOLVED | Noted: 2023-12-08 | Resolved: 2024-11-08

## 2024-11-08 PROBLEM — M70.62 TROCHANTERIC BURSITIS OF LEFT HIP: Status: RESOLVED | Noted: 2018-08-10 | Resolved: 2024-11-08

## 2024-11-08 PROBLEM — B37.31 VAGINAL CANDIDA: Status: RESOLVED | Noted: 2023-05-26 | Resolved: 2024-11-08

## 2024-11-08 PROBLEM — R22.1 LOCALIZED SWELLING, MASS AND LUMP, NECK: Status: RESOLVED | Noted: 2023-10-25 | Resolved: 2024-11-08

## 2024-11-08 PROCEDURE — 99214 OFFICE O/P EST MOD 30 MIN: CPT | Performed by: FAMILY MEDICINE

## 2024-11-08 PROCEDURE — 99396 PREV VISIT EST AGE 40-64: CPT | Performed by: FAMILY MEDICINE

## 2024-11-08 RX ORDER — NYSTATIN 100000 U/G
CREAM TOPICAL 2 TIMES DAILY
Qty: 60 G | Refills: 1 | Status: SHIPPED | OUTPATIENT
Start: 2024-11-08

## 2024-11-08 NOTE — ASSESSMENT & PLAN NOTE
Check routine labs    Orders:    CBC and differential; Future    Lipid panel; Future    TSH, 3rd generation; Future

## 2024-11-08 NOTE — PROGRESS NOTES
Ambulatory Visit  Name: Melissa Dickens      : 1965      MRN: [id number]  Encounter Provider: Dori Velasquez MD  Encounter Date: 2024   Encounter department: [facility]    Assessment & Plan  Annual physical exam  Check routine labs    Orders:    CBC and differential; Future    Lipid panel; Future    TSH, 3rd generation; Future    Dyslipidemia  Check lipid panel         Essential hypertension  Well controlled on current therapy continue with current medications and will reassess next visit           Gastroesophageal reflux disease without esophagitis  Otc prn         Nonrheumatic aortic valve stenosis  Due for echo    Orders:    Echo complete w/ contrast if indicated; Future    Mild mitral regurgitation  Due for echo      Orders:    Echo complete w/ contrast if indicated; Future    Moderate major depression, single episode (HCC)  Doing ok on lexapro and wellbutrin           Cigarette smoker  CT lung    Orders:    CT lung screening program; Future    Candida infection of flexural skin  Refill nystatin cream     Orders:    nystatin (MYCOSTATIN) cream; Apply topically 2 (two) times a day    Benign paroxysmal positional vertigo, unspecified laterality  Meclizine otc         Class 1 obesity due to excess calories with serious comorbidity and body mass index (BMI) of 33.0 to 33.9 in adult  Discussion on diet and exercise guidelines for weight loss and  health reviewed with pt            History of thyroid disorder  TSH          Other ulcerative colitis without complication (HCC)  Pt has colonoscopy          Immunity status testing    Orders:    Varicella zoster antibody, IgG; Future    Multiple thyroid nodules  Us no need for follow up            History of Present Illness     Patient here for annual physical Pt is here for interval visit and evaluation of multiple medical problems, review of medications, labs, Health Maintenance and any recent specialty  "consults              Review of Systems   Constitutional:  Negative for appetite change, chills, fatigue and fever.   Respiratory:  Negative for cough, chest tightness and shortness of breath.    Cardiovascular:  Negative for chest pain, palpitations and leg swelling.   Gastrointestinal:  Negative for abdominal pain, constipation, diarrhea, nausea and vomiting.   Genitourinary:  Negative for difficulty urinating and frequency.   Musculoskeletal:  Negative for arthralgias, back pain, gait problem and neck pain.   Skin:  Negative for rash.   Neurological:  Negative for dizziness, weakness, light-headedness, numbness and headaches.   Hematological:  Does not bruise/bleed easily.   Psychiatric/Behavioral:  Negative for dysphoric mood and sleep disturbance. The patient is not nervous/anxious.            Objective     /80 (BP Location: Left arm, Patient Position: Sitting, Cuff Size: Standard)   Pulse 88   Temp 98 °F (36.7 °C) (Tympanic)   Resp 16   Ht 5' 2\" (1.575 m)   Wt 83 kg (183 lb)   SpO2 96%   BMI 33.47 kg/m²     Physical Exam  Vitals and nursing note reviewed.   Constitutional:       General: She is not in acute distress.     Appearance: Normal appearance. She is well-developed and normal weight.   HENT:      Head: Normocephalic and atraumatic.      Right Ear: Tympanic membrane, ear canal and external ear normal.      Left Ear: Tympanic membrane, ear canal and external ear normal.      Nose: Nose normal.      Mouth/Throat:      Mouth: Mucous membranes are moist.      Pharynx: Oropharynx is clear. No oropharyngeal exudate or posterior oropharyngeal erythema.   Eyes:      Extraocular Movements: Extraocular movements intact.      Conjunctiva/sclera: Conjunctivae normal.      Pupils: Pupils are equal, round, and reactive to light.   Cardiovascular:      Rate and Rhythm: Normal rate and regular rhythm.      Pulses: Normal pulses.      Heart sounds: Murmur (2/6 systolic) heard.   Pulmonary:      Effort: " Pulmonary effort is normal. No respiratory distress.      Breath sounds: Normal breath sounds. No wheezing or rales.   Abdominal:      General: Bowel sounds are normal. There is no distension.      Palpations: Abdomen is soft. There is no mass.      Tenderness: There is no abdominal tenderness. There is no right CVA tenderness, left CVA tenderness, guarding or rebound.      Hernia: No hernia is present.   Musculoskeletal:         General: No swelling or tenderness. Normal range of motion.      Cervical back: Normal range of motion and neck supple.      Right lower leg: No edema.      Left lower leg: No edema.   Skin:     General: Skin is warm and dry.      Capillary Refill: Capillary refill takes less than 2 seconds.      Findings: No rash.      Comments: No abn  moles seen   Neurological:      General: No focal deficit present.      Mental Status: She is alert and oriented to person, place, and time.      Cranial Nerves: No cranial nerve deficit.      Sensory: No sensory deficit.      Motor: No weakness.      Coordination: Coordination normal.      Gait: Gait normal.      Deep Tendon Reflexes: Reflexes normal.   Psychiatric:         Mood and Affect: Mood normal.         Behavior: Behavior normal.

## 2024-11-08 NOTE — ASSESSMENT & PLAN NOTE
Refill nystatin cream     Orders:    nystatin (MYCOSTATIN) cream; Apply topically 2 (two) times a day

## 2024-12-02 ENCOUNTER — APPOINTMENT (OUTPATIENT)
Dept: LAB | Facility: CLINIC | Age: 59
End: 2024-12-02
Payer: COMMERCIAL

## 2024-12-02 ENCOUNTER — RESULTS FOLLOW-UP (OUTPATIENT)
Dept: FAMILY MEDICINE CLINIC | Facility: CLINIC | Age: 59
End: 2024-12-02

## 2024-12-02 DIAGNOSIS — Z01.84 IMMUNITY STATUS TESTING: ICD-10-CM

## 2024-12-02 DIAGNOSIS — Z00.00 ANNUAL PHYSICAL EXAM: ICD-10-CM

## 2024-12-02 LAB
BASOPHILS # BLD AUTO: 0.1 THOUSANDS/ΜL (ref 0–0.1)
BASOPHILS NFR BLD AUTO: 2 % (ref 0–1)
CHOLEST SERPL-MCNC: 174 MG/DL (ref ?–200)
EOSINOPHIL # BLD AUTO: 0.13 THOUSAND/ΜL (ref 0–0.61)
EOSINOPHIL NFR BLD AUTO: 2 % (ref 0–6)
ERYTHROCYTE [DISTWIDTH] IN BLOOD BY AUTOMATED COUNT: 12.2 % (ref 11.6–15.1)
HCT VFR BLD AUTO: 43.4 % (ref 34.8–46.1)
HDLC SERPL-MCNC: 46 MG/DL
HGB BLD-MCNC: 14.2 G/DL (ref 11.5–15.4)
IMM GRANULOCYTES # BLD AUTO: 0.01 THOUSAND/UL (ref 0–0.2)
IMM GRANULOCYTES NFR BLD AUTO: 0 % (ref 0–2)
LDLC SERPL CALC-MCNC: 84 MG/DL (ref 0–100)
LYMPHOCYTES # BLD AUTO: 1.91 THOUSANDS/ΜL (ref 0.6–4.47)
LYMPHOCYTES NFR BLD AUTO: 31 % (ref 14–44)
MCH RBC QN AUTO: 31.6 PG (ref 26.8–34.3)
MCHC RBC AUTO-ENTMCNC: 32.7 G/DL (ref 31.4–37.4)
MCV RBC AUTO: 96 FL (ref 82–98)
MONOCYTES # BLD AUTO: 0.52 THOUSAND/ΜL (ref 0.17–1.22)
MONOCYTES NFR BLD AUTO: 8 % (ref 4–12)
NEUTROPHILS # BLD AUTO: 3.58 THOUSANDS/ΜL (ref 1.85–7.62)
NEUTS SEG NFR BLD AUTO: 57 % (ref 43–75)
NONHDLC SERPL-MCNC: 128 MG/DL
NRBC BLD AUTO-RTO: 0 /100 WBCS
PLATELET # BLD AUTO: 270 THOUSANDS/UL (ref 149–390)
PMV BLD AUTO: 10 FL (ref 8.9–12.7)
RBC # BLD AUTO: 4.5 MILLION/UL (ref 3.81–5.12)
TRIGL SERPL-MCNC: 220 MG/DL (ref ?–150)
TSH SERPL DL<=0.05 MIU/L-ACNC: 2.86 UIU/ML (ref 0.45–4.5)
VZV IGG SER QL IA: NORMAL
WBC # BLD AUTO: 6.25 THOUSAND/UL (ref 4.31–10.16)

## 2024-12-02 PROCEDURE — 84443 ASSAY THYROID STIM HORMONE: CPT

## 2024-12-02 PROCEDURE — 85025 COMPLETE CBC W/AUTO DIFF WBC: CPT

## 2024-12-02 PROCEDURE — 86787 VARICELLA-ZOSTER ANTIBODY: CPT

## 2024-12-02 PROCEDURE — 36415 COLL VENOUS BLD VENIPUNCTURE: CPT

## 2024-12-02 PROCEDURE — 80061 LIPID PANEL: CPT

## 2024-12-05 ENCOUNTER — TELEPHONE (OUTPATIENT)
Age: 59
End: 2024-12-05

## 2024-12-05 NOTE — TELEPHONE ENCOUNTER
Patient requesting a call back to discuss test results.       Ordering Provider:   Date Completed: 12/02/2024    Lab [x]  MRI []  X-Ray []  CT []  Colonoscopy []  EGD []  Biopsy []  Other []

## 2024-12-06 ENCOUNTER — HOSPITAL ENCOUNTER (OUTPATIENT)
Dept: CT IMAGING | Facility: HOSPITAL | Age: 59
End: 2024-12-06
Attending: FAMILY MEDICINE
Payer: COMMERCIAL

## 2024-12-06 ENCOUNTER — HOSPITAL ENCOUNTER (OUTPATIENT)
Dept: NON INVASIVE DIAGNOSTICS | Facility: HOSPITAL | Age: 59
Discharge: HOME | End: 2024-12-06
Attending: FAMILY MEDICINE
Payer: COMMERCIAL

## 2024-12-06 VITALS
HEIGHT: 62 IN | WEIGHT: 183 LBS | DIASTOLIC BLOOD PRESSURE: 80 MMHG | HEART RATE: 88 BPM | BODY MASS INDEX: 33.68 KG/M2 | SYSTOLIC BLOOD PRESSURE: 122 MMHG

## 2024-12-06 DIAGNOSIS — I35.0 NONRHEUMATIC AORTIC VALVE STENOSIS: ICD-10-CM

## 2024-12-06 DIAGNOSIS — F17.210 CIGARETTE SMOKER: ICD-10-CM

## 2024-12-06 DIAGNOSIS — I34.0 MILD MITRAL REGURGITATION: ICD-10-CM

## 2024-12-06 LAB
AORTIC ROOT: 2.8 CM
AORTIC VALVE MEAN VELOCITY: 14.2 M/S
APICAL FOUR CHAMBER EJECTION FRACTION: 61 %
ASCENDING AORTA: 3.1 CM
AV AREA BY CONTINUOUS VTI: 1.5 CM2
AV AREA PEAK VELOCITY: 1.5 CM2
AV LVOT MEAN GRADIENT: 2 MMHG
AV LVOT PEAK GRADIENT: 4 MMHG
AV MEAN GRADIENT: 9 MMHG
AV PEAK GRADIENT: 17 MMHG
AV VALVE AREA: 1.45 CM2
AV VELOCITY RATIO: 0.47
BSA FOR ECHO PROCEDURE: 1.84 M2
DOP CALC AO PEAK VEL: 2.03 M/S
DOP CALC AO VTI: 46.86 CM
DOP CALC LVOT AREA: 3.14 CM2
DOP CALC LVOT CARDIAC INDEX: 2.69 L/MIN/M2
DOP CALC LVOT CARDIAC OUTPUT: 4.95 L/MIN
DOP CALC LVOT DIAMETER: 2 CM
DOP CALC LVOT PEAK VEL VTI: 21.71 CM
DOP CALC LVOT PEAK VEL: 0.95 M/S
DOP CALC LVOT STROKE INDEX: 37.5 ML/M2
DOP CALC LVOT STROKE VOLUME: 68.17
E WAVE DECELERATION TIME: 176 MS
E/A RATIO: 1.19
FRACTIONAL SHORTENING: 47 (ref 28–44)
INTERVENTRICULAR SEPTUM IN DIASTOLE (PARASTERNAL SHORT AXIS VIEW): 1 CM
INTERVENTRICULAR SEPTUM: 1 CM (ref 0.6–1.1)
LAAS-AP2: 13.7 CM2
LAAS-AP4: 15.4 CM2
LEFT ATRIUM SIZE: 2.9 CM
LEFT ATRIUM VOLUME (MOD BIPLANE): 36 ML
LEFT ATRIUM VOLUME INDEX (MOD BIPLANE): 19.6 ML/M2
LEFT INTERNAL DIMENSION IN SYSTOLE: 2.3 CM (ref 2.1–4)
LEFT VENTRICULAR INTERNAL DIMENSION IN DIASTOLE: 4.3 CM (ref 3.5–6)
LEFT VENTRICULAR POSTERIOR WALL IN END DIASTOLE: 1 CM
LEFT VENTRICULAR STROKE VOLUME: 64 ML
LVSV (TEICH): 64 ML
MV E'TISSUE VEL-LAT: 14 CM/S
MV E'TISSUE VEL-SEP: 14 CM/S
MV PEAK A VEL: 0.84 M/S
MV PEAK E VEL: 100 CM/S
MV STENOSIS PRESSURE HALF TIME: 51 MS
MV VALVE AREA P 1/2 METHOD: 4.31
RA PRESSURE ESTIMATED: 3 MMHG
RIGHT ATRIUM AREA SYSTOLE A4C: 9.3 CM2
RIGHT VENTRICLE ID DIMENSION: 2.9 CM
RV PSP: 30 MMHG
SL CV LEFT ATRIUM LENGTH A2C: 4.5 CM
SL CV LV EF: 60
SL CV PED ECHO LEFT VENTRICLE DIASTOLIC VOLUME (MOD BIPLANE) 2D: 83 ML
SL CV PED ECHO LEFT VENTRICLE SYSTOLIC VOLUME (MOD BIPLANE) 2D: 19 ML
TR MAX PG: 27 MMHG
TR PEAK VELOCITY: 2.6 M/S
TRICUSPID ANNULAR PLANE SYSTOLIC EXCURSION: 2.4 CM
TRICUSPID VALVE PEAK REGURGITATION VELOCITY: 2.61 M/S

## 2024-12-06 PROCEDURE — 93306 TTE W/DOPPLER COMPLETE: CPT

## 2024-12-06 PROCEDURE — 93306 TTE W/DOPPLER COMPLETE: CPT | Performed by: INTERNAL MEDICINE

## 2024-12-30 ENCOUNTER — TELEPHONE (OUTPATIENT)
Dept: FAMILY MEDICINE CLINIC | Facility: CLINIC | Age: 59
End: 2024-12-30

## 2024-12-30 DIAGNOSIS — E78.5 DYSLIPIDEMIA: ICD-10-CM

## 2024-12-30 RX ORDER — PRAVASTATIN SODIUM 20 MG
20 TABLET ORAL
Qty: 100 TABLET | Refills: 3 | Status: SHIPPED | OUTPATIENT
Start: 2024-12-30

## 2025-01-03 ENCOUNTER — CLINICAL SUPPORT (OUTPATIENT)
Dept: FAMILY MEDICINE CLINIC | Facility: CLINIC | Age: 60
End: 2025-01-03
Payer: COMMERCIAL

## 2025-01-03 DIAGNOSIS — Z23 ENCOUNTER FOR IMMUNIZATION: Primary | ICD-10-CM

## 2025-01-03 PROCEDURE — 90471 IMMUNIZATION ADMIN: CPT

## 2025-01-03 PROCEDURE — 90750 HZV VACC RECOMBINANT IM: CPT

## 2025-01-20 DIAGNOSIS — E78.5 DYSLIPIDEMIA: ICD-10-CM

## 2025-01-21 RX ORDER — EZETIMIBE 10 MG/1
10 TABLET ORAL DAILY
Qty: 90 TABLET | Refills: 3 | Status: SHIPPED | OUTPATIENT
Start: 2025-01-21

## 2025-01-29 ENCOUNTER — APPOINTMENT (OUTPATIENT)
Dept: RADIOLOGY | Facility: AMBULARY SURGERY CENTER | Age: 60
End: 2025-01-29
Attending: SURGERY
Payer: COMMERCIAL

## 2025-01-29 ENCOUNTER — OFFICE VISIT (OUTPATIENT)
Dept: OBGYN CLINIC | Facility: CLINIC | Age: 60
End: 2025-01-29
Payer: COMMERCIAL

## 2025-01-29 VITALS — BODY MASS INDEX: 33.13 KG/M2 | HEIGHT: 62 IN | WEIGHT: 180 LBS

## 2025-01-29 DIAGNOSIS — R22.31 MASS OF RIGHT HAND: ICD-10-CM

## 2025-01-29 DIAGNOSIS — R22.31 MASS OF RIGHT HAND: Primary | ICD-10-CM

## 2025-01-29 DIAGNOSIS — Z01.812 PRE-PROCEDURE LAB EXAM: ICD-10-CM

## 2025-01-29 DIAGNOSIS — M65.311 TRIGGER THUMB OF RIGHT HAND: ICD-10-CM

## 2025-01-29 PROCEDURE — 99214 OFFICE O/P EST MOD 30 MIN: CPT | Performed by: SURGERY

## 2025-01-29 PROCEDURE — 73130 X-RAY EXAM OF HAND: CPT

## 2025-01-29 RX ORDER — SODIUM CHLORIDE 9 MG/ML
75 INJECTION, SOLUTION INTRAVENOUS CONTINUOUS
OUTPATIENT
Start: 2025-01-29

## 2025-01-29 RX ORDER — CHLORHEXIDINE GLUCONATE ORAL RINSE 1.2 MG/ML
15 SOLUTION DENTAL ONCE
OUTPATIENT
Start: 2025-01-29 | End: 2025-01-29

## 2025-01-29 NOTE — PROGRESS NOTES
ASSESSMENT/PLAN:      Assessment & Plan  Mass of right hand  No evidence of calcification of bony abnormality in the area of painful mass  Will obtain ultrasound of the region for possible excision at the time of trigger thumb release  Orders:    XR hand 3+ vw right; Future    US extremity soft tissue; Future    Case request operating room: RELEASE TRIGGER FINGER, right thumb, Possible EXCISION BIOPSY TISSUE LESION/MASS UPPER EXTREMITY, right hand; Standing    Basic metabolic panel; Future    EKG 12 lead; Future    Trigger thumb of right hand  Patient previously underwent other trigger finger injections without success and ended up having trigger finger surgery reasonable to proceed with trigger finger release of the right thumb without prior injection  Orders:    Case request operating room: RELEASE TRIGGER FINGER, right thumb, Possible EXCISION BIOPSY TISSUE LESION/MASS UPPER EXTREMITY, right hand; Standing    Basic metabolic panel; Future    EKG 12 lead; Future    Pre-procedure lab exam    Orders:    Basic metabolic panel; Future    EKG 12 lead; Future          59 y.o. female with a right trigger thumb and a suspected right hand mass to 4th webspace     Right hand x-rays were performed in the office and reviewed. The etiology of above diagnosis was discussed. On PE she has a mass sitting near the MCP joint ulnarly of the ring finger MCP joint in the 4th webspace. We discussed this is likely a retinacular cyst vs ganglion cyst. Right trigger thumb release and possible right hand mass excision was discussed at length including risks and benefits. Common risks include bleeding, infection, injury to nearby structures (vessels, nerves, tendons, ligaments), blood clots (deep vein thrombosis / pulmonary embolus), and wound healing problems. Infection may result in an infection of the blood stream and/or the need for oral or intravenous antibiotics. Additional risks include impaired limb function, loss of limb and/or  failure to achieve desired results. There is a risk for unacceptable cosmetic results (such as scarring). Surgery of the hand is associated with bleeding, infection, scar, pain, wound healing problems, damage to nerves, arteries, tendons, ligaments, failure to give desired result, instability, nonunion, malunion hardware issues, and need for more surgery. A STAT US of the right 4th webspace mass was ordered  to further evaluate the mass well as to determine if this is solid or cystic for surgical planning purposes. US must be completed prior to surgery. Melissa elected to proceed with a right trigger thumb release and possible right hand mass excision and informed electronic surgical consent was signed. Post operative instructions/expectations were reviewed and provided in AVS. BMP and EKG will be obtained prior to surgery as well as the right hand US. I will see her back in the office 10-14 days post op for suture removal.       The patient verbalized understanding of exam findings and treatment plan. We engaged in the shared decision-making process and treatment options were discussed at length with the patient. Surgical and conservative management discussed today along with risks and benefits.    Diagnoses and all orders for this visit:    Mass of right hand  -     XR hand 3+ vw right; Future  -     Cancel: US extremity soft tissue; Future  -     US extremity soft tissue; Future    Trigger thumb of right hand      Ganglion Cyst Excision: The anatomy and physiology of the ganglion was discussed with the patient today in the office.  Fluid leaking out of the joint surface typically creates a small sac, which can enlarge and cause pain or limitation of motion.  Treatment options include observation, aspiration, or surgical incision were discussed with the patient today.  Observation typically lead to resolution and approximately 10% of patients, aspiration least resolution approximately 50% of patients, and surgical  excision lead to resolution in approximately 97% of patients.  After discussion with the patient today, the patient voiced understanding of all treatment options.The patient has elected excision of the ganglion.The risks and benefits of the procedure were explained to the patient, which include, but are not limited to: Bleeding, infection, recurrence, pain, scar, damage to tendons, damage to nerves, and damage to blood vessels, failure to give desired results and complications related to anesthesia.  These risks, along with alternative conservative treatment options, and postoperative protocols were voiced back and understood by the patient.  All questions were answered to the patient's satisfaction.  The patient agrees to comply with a standard postoperative protocol, and is willing to proceed.  Education was provided via written and auditory forms.  There were no barriers to learning. Written handouts regarding wound care, incision and scar care, and general preoperative information was provided to the patient.  Prior to surgery, the patient may be requested to stop all anti-inflammatory medications.  Prophylactic aspirin, Plavix, and Coumadin may be allowed to be continued.  Medications including vitamin E., ginkgo, and fish oil are requested to be stopped approximately one week prior to surgery.  Hypertensive medications and beta blockers, if taken, should be continued., Trigger Finger Release: The anatomy and physiology of trigger finger was discussed with the patient today in the office.  Edema and increased contact pressure within the flexor tendons at the A1 pulley can cause pain, crepitation, and limitation of function.  Treatment options include resting MP blocking splints to decrease edema, oral anti-inflammatory medications, home or formal therapy exercises, up to 2 steroid injections or surgical release.  While majority of patients do respond to conservative treatment, up to 20% may require surgical  release.  The patient has elected release of the trigger finger.  The patient has elected to undergo a release of the A1 pulley (trigger finger).  A small incision will be made over the palmar aspect of the hand, the tendon sheath holding the flexor tendons will be released.  In the postoperative period, light activities are allowed immediately, driving is allowed when narcotic medication has stopped, and the incision may get wet after 2 days.  Heavy activities (lifting more than approximately 10 pounds) will be allowed after the follow up appointment in 1-2 weeks.  While the pain and discomfort within the wrist typically improves rapidly, some residual discomfort may be present for up to 6 weeks.  The nodule that is typically palpable in the palmar aspect of the hand will not be removed, as this would necessitate removal of a portion of the flexor tendon, however the catching, clicking, and locking should resolve.  Approximate success rate is 98%.The risks and benefits of the procedure were explained to the patient, which include, but are not limited to: Bleeding, infection, recurrence, pain, scar, damage to tendons, damage to nerves, and damage to blood vessels, need for future surgery and complications related to anesthesia.  If bony work is done, risks also include malunion and nonunion.  These risks, along with alternative conservative treatment options, and postoperative protocols were voiced back and understood by the patient.  All questions were answered to the patient's satisfaction.  The patient agrees to comply with a standard postoperative protocol, and is willing to proceed.  Education was provided via written and auditory forms.  There were no barriers to learning. Written handouts regarding wound care, incision and scar care, and general preoperative information, as well as risks and benefits were provided to the patient., and Standard Consent: The risks and benefits of the procedure were explained to  the patient, which include, but are not limited to: Bleeding, infection, recurrence, pain, scar, damage to tendons, damage to nerves, and damage to blood vessels, failure to give desired results and complications related to anesthesia.  These risks, along with alternative conservative treatment options, and postoperative protocols were voiced back and understood by the patient.  All questions were answered to the patient's satisfaction.  The patient agrees to comply with a standard postoperative protocol, and is willing to proceed.  Education was provided via written and auditory forms.  There were no barriers to learning. Written handouts regarding wound care, incision and scar care, and general preoperative information was provided to the patient.  Prior to surgery, the patient may be requested to stop all anti-inflammatory medications.  Prophylactic aspirin, Plavix, and Coumadin may be allowed to be continued.  Medications including vitamin E., ginkgo, and fish oil are requested to be stopped approximately one week prior to surgery.  Hypertensive medications and beta blockers, if taken, should be continued.    Follow Up:  Return for US to be completed prior to SX, fu 10-14 days PO.      To Do Next Visit:  Sutures out    ____________________________________________________________________________________________________________________________________________      CHIEF COMPLAINT:  Chief Complaint   Patient presents with    Right Thumb - Triggering       SUBJECTIVE:  Melissa Dickens is a 59 y.o. year old RHD female who presents to the office for evaluation of her right thumb. She notes her right thumb has been triggering for approx. 2 months. She is able to flick open the finger when locking occurs. She notes pain to her A1 pulley area. She also notes a mass to her 4th webspace which is painful if it is touched or accidentally bumped. This has been present for approx. 1 month and has not changed in size since she  first noticed it.        I have personally reviewed all the relevant PMH, PSH, SH, FH, Medications and allergies.     PAST MEDICAL HISTORY:  Past Medical History:   Diagnosis Date    Asthmatic bronchitis 2024    Colon polyp     Depression     GERD (gastroesophageal reflux disease)     19 under control at this time, no meds    Heart murmur     HL (hearing loss)     Hyperlipidemia     Hypertension     Inflammatory bowel disease     Joint pain     left hip labral tear    Tinnitus     Ulcerative colitis (HCC)     19 no meds at this time    Vertigo        PAST SURGICAL HISTORY:  Past Surgical History:   Procedure Laterality Date     SECTION N/A 189,1993    COLONOSCOPY  2017    COLONOSCOPY      FL INJECTION LEFT HIP (NON ARTHROGRAM)  12/10/2018    FL INJECTION LEFT HIP (NON ARTHROGRAM)  2020    FL INJECTION LEFT HIP (NON ARTHROGRAM)  2022    FL INJECTION LEFT HIP (NON ARTHROGRAM)  2022    MAMMO (HISTORICAL)  2020    OR LAPAROSCOPY SURG CHOLECYSTECTOMY N/A 2024    Procedure: ROBOTIC CHOLECYSTECTOMY, POSSIBLE OPEN;  Surgeon: Hiram Salgado MD;  Location:  MAIN OR;  Service: General    OR TENDON SHEATH INCISION Left 2024    Procedure: RELEASE TRIGGER RING FINGER;  Surgeon: Sheila Dumont MD;  Location:  MAIN OR;  Service: Orthopedics    WISDOM TOOTH EXTRACTION         FAMILY HISTORY:  Family History   Problem Relation Age of Onset    Heart disease Mother     Diabetes Mother     Heart disease Father     Colon cancer Father     Breast cancer Sister         50s    No Known Problems Daughter     No Known Problems Maternal Grandmother     No Known Problems Maternal Grandfather     Uterine cancer Paternal Grandmother     No Known Problems Paternal Grandfather     Thyroid disease Brother     Cancer Brother         Thyroid cancer    No Known Problems Maternal Aunt     No Known Problems Maternal Aunt     No Known Problems Maternal Aunt     No Known Problems Paternal  Aunt     No Known Problems Paternal Aunt        SOCIAL HISTORY:  Social History     Tobacco Use    Smoking status: Every Day     Current packs/day: 0.75     Average packs/day: 0.8 packs/day for 44.4 years (33.3 ttl pk-yrs)     Types: Cigarettes     Start date: 9/3/1980    Smokeless tobacco: Never    Tobacco comments:     started at age 16 per Yi pt has not smoked the steady timefram of 30 yrs quit  for 3 yeaes at least 2 times   Vaping Use    Vaping status: Never Used   Substance Use Topics    Alcohol use: Not Currently     Comment: rarely    Drug use: Never       MEDICATIONS:    Current Outpatient Medications:     buPROPion (WELLBUTRIN XL) 150 mg 24 hr tablet, TAKE 1 TABLET EVERY MORNING, Disp: 90 tablet, Rfl: 1    escitalopram (LEXAPRO) 5 mg tablet, TAKE 1 TABLET DAILY, Disp: 90 tablet, Rfl: 1    ezetimibe (ZETIA) 10 mg tablet, TAKE 1 TABLET DAILY, Disp: 90 tablet, Rfl: 3    fluticasone (FLONASE) 50 mcg/act nasal spray, 2 sprays into each nostril daily, Disp: , Rfl:     nystatin (MYCOSTATIN) cream, Apply topically 2 (two) times a day, Disp: 60 g, Rfl: 1    pravastatin (PRAVACHOL) 20 mg tablet, Take 1 tablet (20 mg total) by mouth daily at bedtime, Disp: 100 tablet, Rfl: 3    ALLERGIES:  No Known Allergies    REVIEW OF SYSTEMS:  Review of Systems   Constitutional:  Negative for chills, fever and unexpected weight change.   HENT:  Negative for hearing loss, nosebleeds and sore throat.    Eyes:  Negative for pain, redness and visual disturbance.   Respiratory:  Negative for cough, shortness of breath and wheezing.    Cardiovascular:  Negative for chest pain, palpitations and leg swelling.   Gastrointestinal:  Negative for abdominal pain, nausea and vomiting.   Endocrine: Negative for polydipsia and polyuria.   Genitourinary:  Negative for difficulty urinating and hematuria.   Musculoskeletal:  Negative for arthralgias, joint swelling and myalgias.   Skin:  Negative for rash and wound.   Neurological:  Negative  for dizziness, numbness and headaches.   Psychiatric/Behavioral:  Negative for decreased concentration, dysphoric mood and suicidal ideas. The patient is not nervous/anxious.        VITALS:  There were no vitals filed for this visit.      _____________________________________________________  PHYSICAL EXAMINATION:  General: well developed and well nourished, alert, oriented times 3, and appears comfortable  Psychiatric: Normal  HEENT: Normocephalic, Atraumatic Trachea Midline, No torticollis  Pulmonary: No audible wheezing or respiratory distress   Cardiovascular: No pitting edema, 2+ radial pulse   Abdominal/GI: abdomen non tender, non distended   Skin: No erythema, lacerations, fluctation, ulcerations  Neurovascular: Sensation Intact to the Median, Ulnar, Radial Nerve, Motor Intact to the Median, Ulnar, Radial Nerve, and Pulses Intact  Musculoskeletal: Normal, except as noted in detailed exam and in HPI.      MUSCULOSKELETAL EXAMINATION:    Right thumb:  Negative palpable nodule over the A1 pulley.  Positive tenderness to palpation over A1 pulley. Negative catching. Positive clicking.      Palpable mass sitting near the MCP joint of the ring finger in the 4th webspace, mass is significantly tender to palpation     ___________________________________________________    STUDIES REVIEWED:  I have personally reviewed and interpreted  AP lateral and oblique radiographs of right hand   which demonstrate no calcification in  area of mass        LABS REVIEWED:    HgA1c:   Lab Results   Component Value Date    HGBA1C 5.4 08/27/2022     BMP:   Lab Results   Component Value Date    CALCIUM 8.7 09/12/2024    K 4.1 09/12/2024    CO2 25 09/12/2024     09/12/2024    BUN 16 09/12/2024    CREATININE 1.00 09/12/2024             PROCEDURES PERFORMED:  Procedures  No Procedures performed today    _____________________________________________________      Scribe Attestation      I,:  Evelia Chopra MA am acting as a scribe  while in the presence of the attending physician.:       I,:  Sheila Dumont MD personally performed the services described in this documentation    as scribed in my presence.:

## 2025-01-29 NOTE — H&P
ASSESSMENT/PLAN:      Assessment & Plan  Mass of right hand  No evidence of calcification of bony abnormality in the area of painful mass  Will obtain ultrasound of the region for possible excision at the time of trigger thumb release  Orders:    XR hand 3+ vw right; Future    US extremity soft tissue; Future    Case request operating room: RELEASE TRIGGER FINGER, right thumb, Possible EXCISION BIOPSY TISSUE LESION/MASS UPPER EXTREMITY, right hand; Standing    Basic metabolic panel; Future    EKG 12 lead; Future    Trigger thumb of right hand  Patient previously underwent other trigger finger injections without success and ended up having trigger finger surgery reasonable to proceed with trigger finger release of the right thumb without prior injection  Orders:    Case request operating room: RELEASE TRIGGER FINGER, right thumb, Possible EXCISION BIOPSY TISSUE LESION/MASS UPPER EXTREMITY, right hand; Standing    Basic metabolic panel; Future    EKG 12 lead; Future    Pre-procedure lab exam    Orders:    Basic metabolic panel; Future    EKG 12 lead; Future          59 y.o. female with a right trigger thumb and a suspected right hand mass to 4th webspace     Right hand x-rays were performed in the office and reviewed. The etiology of above diagnosis was discussed. On PE she has a mass sitting near the MCP joint ulnarly of the ring finger MCP joint in the 4th webspace. We discussed this is likely a retinacular cyst vs ganglion cyst. Right trigger thumb release and possible right hand mass excision was discussed at length including risks and benefits. Common risks include bleeding, infection, injury to nearby structures (vessels, nerves, tendons, ligaments), blood clots (deep vein thrombosis / pulmonary embolus), and wound healing problems. Infection may result in an infection of the blood stream and/or the need for oral or intravenous antibiotics. Additional risks include impaired limb function, loss of limb and/or  failure to achieve desired results. There is a risk for unacceptable cosmetic results (such as scarring). Surgery of the hand is associated with bleeding, infection, scar, pain, wound healing problems, damage to nerves, arteries, tendons, ligaments, failure to give desired result, instability, nonunion, malunion hardware issues, and need for more surgery. A STAT US of the right 4th webspace mass was ordered  to further evaluate the mass well as to determine if this is solid or cystic for surgical planning purposes. US must be completed prior to surgery. Melissa elected to proceed with a right trigger thumb release and possible right hand mass excision and informed electronic surgical consent was signed. Post operative instructions/expectations were reviewed and provided in AVS. BMP and EKG will be obtained prior to surgery as well as the right hand US. I will see her back in the office 10-14 days post op for suture removal.       The patient verbalized understanding of exam findings and treatment plan. We engaged in the shared decision-making process and treatment options were discussed at length with the patient. Surgical and conservative management discussed today along with risks and benefits.    Diagnoses and all orders for this visit:    Mass of right hand  -     XR hand 3+ vw right; Future  -     Cancel: US extremity soft tissue; Future  -     US extremity soft tissue; Future    Trigger thumb of right hand      Ganglion Cyst Excision: The anatomy and physiology of the ganglion was discussed with the patient today in the office.  Fluid leaking out of the joint surface typically creates a small sac, which can enlarge and cause pain or limitation of motion.  Treatment options include observation, aspiration, or surgical incision were discussed with the patient today.  Observation typically lead to resolution and approximately 10% of patients, aspiration least resolution approximately 50% of patients, and surgical  excision lead to resolution in approximately 97% of patients.  After discussion with the patient today, the patient voiced understanding of all treatment options.The patient has elected excision of the ganglion.The risks and benefits of the procedure were explained to the patient, which include, but are not limited to: Bleeding, infection, recurrence, pain, scar, damage to tendons, damage to nerves, and damage to blood vessels, failure to give desired results and complications related to anesthesia.  These risks, along with alternative conservative treatment options, and postoperative protocols were voiced back and understood by the patient.  All questions were answered to the patient's satisfaction.  The patient agrees to comply with a standard postoperative protocol, and is willing to proceed.  Education was provided via written and auditory forms.  There were no barriers to learning. Written handouts regarding wound care, incision and scar care, and general preoperative information was provided to the patient.  Prior to surgery, the patient may be requested to stop all anti-inflammatory medications.  Prophylactic aspirin, Plavix, and Coumadin may be allowed to be continued.  Medications including vitamin E., ginkgo, and fish oil are requested to be stopped approximately one week prior to surgery.  Hypertensive medications and beta blockers, if taken, should be continued., Trigger Finger Release: The anatomy and physiology of trigger finger was discussed with the patient today in the office.  Edema and increased contact pressure within the flexor tendons at the A1 pulley can cause pain, crepitation, and limitation of function.  Treatment options include resting MP blocking splints to decrease edema, oral anti-inflammatory medications, home or formal therapy exercises, up to 2 steroid injections or surgical release.  While majority of patients do respond to conservative treatment, up to 20% may require surgical  release.  The patient has elected release of the trigger finger.  The patient has elected to undergo a release of the A1 pulley (trigger finger).  A small incision will be made over the palmar aspect of the hand, the tendon sheath holding the flexor tendons will be released.  In the postoperative period, light activities are allowed immediately, driving is allowed when narcotic medication has stopped, and the incision may get wet after 2 days.  Heavy activities (lifting more than approximately 10 pounds) will be allowed after the follow up appointment in 1-2 weeks.  While the pain and discomfort within the wrist typically improves rapidly, some residual discomfort may be present for up to 6 weeks.  The nodule that is typically palpable in the palmar aspect of the hand will not be removed, as this would necessitate removal of a portion of the flexor tendon, however the catching, clicking, and locking should resolve.  Approximate success rate is 98%.The risks and benefits of the procedure were explained to the patient, which include, but are not limited to: Bleeding, infection, recurrence, pain, scar, damage to tendons, damage to nerves, and damage to blood vessels, need for future surgery and complications related to anesthesia.  If bony work is done, risks also include malunion and nonunion.  These risks, along with alternative conservative treatment options, and postoperative protocols were voiced back and understood by the patient.  All questions were answered to the patient's satisfaction.  The patient agrees to comply with a standard postoperative protocol, and is willing to proceed.  Education was provided via written and auditory forms.  There were no barriers to learning. Written handouts regarding wound care, incision and scar care, and general preoperative information, as well as risks and benefits were provided to the patient., and Standard Consent: The risks and benefits of the procedure were explained to  the patient, which include, but are not limited to: Bleeding, infection, recurrence, pain, scar, damage to tendons, damage to nerves, and damage to blood vessels, failure to give desired results and complications related to anesthesia.  These risks, along with alternative conservative treatment options, and postoperative protocols were voiced back and understood by the patient.  All questions were answered to the patient's satisfaction.  The patient agrees to comply with a standard postoperative protocol, and is willing to proceed.  Education was provided via written and auditory forms.  There were no barriers to learning. Written handouts regarding wound care, incision and scar care, and general preoperative information was provided to the patient.  Prior to surgery, the patient may be requested to stop all anti-inflammatory medications.  Prophylactic aspirin, Plavix, and Coumadin may be allowed to be continued.  Medications including vitamin E., ginkgo, and fish oil are requested to be stopped approximately one week prior to surgery.  Hypertensive medications and beta blockers, if taken, should be continued.    Follow Up:  Return for US to be completed prior to SX, fu 10-14 days PO.      To Do Next Visit:  Sutures out    ____________________________________________________________________________________________________________________________________________      CHIEF COMPLAINT:  Chief Complaint   Patient presents with    Right Thumb - Triggering       SUBJECTIVE:  Melissa Dickens is a 59 y.o. year old RHD female who presents to the office for evaluation of her right thumb. She notes her right thumb has been triggering for approx. 2 months. She is able to flick open the finger when locking occurs. She notes pain to her A1 pulley area. She also notes a mass to her 4th webspace which is painful if it is touched or accidentally bumped. This has been present for approx. 1 month and has not changed in size since she  first noticed it.        I have personally reviewed all the relevant PMH, PSH, SH, FH, Medications and allergies.     PAST MEDICAL HISTORY:  Past Medical History:   Diagnosis Date    Asthmatic bronchitis 2024    Colon polyp     Depression     GERD (gastroesophageal reflux disease)     21 under control at this time, no meds    Heart murmur     HL (hearing loss)     Hyperlipidemia     Hypertension     Inflammatory bowel disease     Joint pain     left hip labral tear    Tinnitus     Ulcerative colitis (HCC)     19 no meds at this time    Vertigo        PAST SURGICAL HISTORY:  Past Surgical History:   Procedure Laterality Date     SECTION N/A 189,1993    COLONOSCOPY  2017    COLONOSCOPY      FL INJECTION LEFT HIP (NON ARTHROGRAM)  12/10/2018    FL INJECTION LEFT HIP (NON ARTHROGRAM)  2020    FL INJECTION LEFT HIP (NON ARTHROGRAM)  2022    FL INJECTION LEFT HIP (NON ARTHROGRAM)  2022    MAMMO (HISTORICAL)  2020    HI LAPAROSCOPY SURG CHOLECYSTECTOMY N/A 2024    Procedure: ROBOTIC CHOLECYSTECTOMY, POSSIBLE OPEN;  Surgeon: Hiram Salgado MD;  Location:  MAIN OR;  Service: General    HI TENDON SHEATH INCISION Left 2024    Procedure: RELEASE TRIGGER RING FINGER;  Surgeon: Sheila Dumont MD;  Location:  MAIN OR;  Service: Orthopedics    WISDOM TOOTH EXTRACTION         FAMILY HISTORY:  Family History   Problem Relation Age of Onset    Heart disease Mother     Diabetes Mother     Heart disease Father     Colon cancer Father     Breast cancer Sister         50s    No Known Problems Daughter     No Known Problems Maternal Grandmother     No Known Problems Maternal Grandfather     Uterine cancer Paternal Grandmother     No Known Problems Paternal Grandfather     Thyroid disease Brother     Cancer Brother         Thyroid cancer    No Known Problems Maternal Aunt     No Known Problems Maternal Aunt     No Known Problems Maternal Aunt     No Known Problems Paternal  Aunt     No Known Problems Paternal Aunt        SOCIAL HISTORY:  Social History     Tobacco Use    Smoking status: Every Day     Current packs/day: 0.75     Average packs/day: 0.8 packs/day for 44.4 years (33.3 ttl pk-yrs)     Types: Cigarettes     Start date: 9/3/1980    Smokeless tobacco: Never    Tobacco comments:     started at age 16 per Yi pt has not smoked the steady timefram of 30 yrs quit  for 3 yeaes at least 2 times   Vaping Use    Vaping status: Never Used   Substance Use Topics    Alcohol use: Not Currently     Comment: rarely    Drug use: Never       MEDICATIONS:    Current Outpatient Medications:     buPROPion (WELLBUTRIN XL) 150 mg 24 hr tablet, TAKE 1 TABLET EVERY MORNING, Disp: 90 tablet, Rfl: 1    escitalopram (LEXAPRO) 5 mg tablet, TAKE 1 TABLET DAILY, Disp: 90 tablet, Rfl: 1    ezetimibe (ZETIA) 10 mg tablet, TAKE 1 TABLET DAILY, Disp: 90 tablet, Rfl: 3    fluticasone (FLONASE) 50 mcg/act nasal spray, 2 sprays into each nostril daily, Disp: , Rfl:     nystatin (MYCOSTATIN) cream, Apply topically 2 (two) times a day, Disp: 60 g, Rfl: 1    pravastatin (PRAVACHOL) 20 mg tablet, Take 1 tablet (20 mg total) by mouth daily at bedtime, Disp: 100 tablet, Rfl: 3    ALLERGIES:  No Known Allergies    REVIEW OF SYSTEMS:  Review of Systems   Constitutional:  Negative for chills, fever and unexpected weight change.   HENT:  Negative for hearing loss, nosebleeds and sore throat.    Eyes:  Negative for pain, redness and visual disturbance.   Respiratory:  Negative for cough, shortness of breath and wheezing.    Cardiovascular:  Negative for chest pain, palpitations and leg swelling.   Gastrointestinal:  Negative for abdominal pain, nausea and vomiting.   Endocrine: Negative for polydipsia and polyuria.   Genitourinary:  Negative for difficulty urinating and hematuria.   Musculoskeletal:  Negative for arthralgias, joint swelling and myalgias.   Skin:  Negative for rash and wound.   Neurological:  Negative  for dizziness, numbness and headaches.   Psychiatric/Behavioral:  Negative for decreased concentration, dysphoric mood and suicidal ideas. The patient is not nervous/anxious.        VITALS:  There were no vitals filed for this visit.      _____________________________________________________  PHYSICAL EXAMINATION:  General: well developed and well nourished, alert, oriented times 3, and appears comfortable  Psychiatric: Normal  HEENT: Normocephalic, Atraumatic Trachea Midline, No torticollis  Pulmonary: No audible wheezing or respiratory distress   Cardiovascular: No pitting edema, 2+ radial pulse   Abdominal/GI: abdomen non tender, non distended   Skin: No erythema, lacerations, fluctation, ulcerations  Neurovascular: Sensation Intact to the Median, Ulnar, Radial Nerve, Motor Intact to the Median, Ulnar, Radial Nerve, and Pulses Intact  Musculoskeletal: Normal, except as noted in detailed exam and in HPI.      MUSCULOSKELETAL EXAMINATION:    Right thumb:  Negative palpable nodule over the A1 pulley.  Positive tenderness to palpation over A1 pulley. Negative catching. Positive clicking.      Palpable mass sitting near the MCP joint of the ring finger in the 4th webspace, mass is significantly tender to palpation     ___________________________________________________    STUDIES REVIEWED:  I have personally reviewed and interpreted  AP lateral and oblique radiographs of right hand   which demonstrate no calcification in  area of mass        LABS REVIEWED:    HgA1c:   Lab Results   Component Value Date    HGBA1C 5.4 08/27/2022     BMP:   Lab Results   Component Value Date    CALCIUM 8.7 09/12/2024    K 4.1 09/12/2024    CO2 25 09/12/2024     09/12/2024    BUN 16 09/12/2024    CREATININE 1.00 09/12/2024             PROCEDURES PERFORMED:  Procedures  No Procedures performed today    _____________________________________________________      Scribe Attestation      I,:  Evelia Chopra MA am acting as a scribe  while in the presence of the attending physician.:       I,:  Sheila Dumont MD personally performed the services described in this documentation    as scribed in my presence.:

## 2025-01-30 ENCOUNTER — HOSPITAL ENCOUNTER (OUTPATIENT)
Dept: ULTRASOUND IMAGING | Facility: HOSPITAL | Age: 60
End: 2025-01-30
Attending: SURGERY
Payer: COMMERCIAL

## 2025-01-30 DIAGNOSIS — R22.31 MASS OF RIGHT HAND: ICD-10-CM

## 2025-01-30 PROCEDURE — 76882 US LMTD JT/FCL EVL NVASC XTR: CPT

## 2025-02-10 ENCOUNTER — TELEPHONE (OUTPATIENT)
Age: 60
End: 2025-02-10

## 2025-02-10 NOTE — TELEPHONE ENCOUNTER
Caller: Patient    Doctor: Dr. Dumont    Reason for call:     Patient is calling about her surgery on right thumb on 4/1/25.  She is asking if she can be put on a cancelation list to have the surgery sooner.   Please adsvise the patient.    Call back#: [phone]

## 2025-02-28 ENCOUNTER — ANESTHESIA EVENT (OUTPATIENT)
Dept: GASTROENTEROLOGY | Facility: HOSPITAL | Age: 60
End: 2025-02-28
Payer: COMMERCIAL

## 2025-02-28 ENCOUNTER — HOSPITAL ENCOUNTER (OUTPATIENT)
Dept: GASTROENTEROLOGY | Facility: HOSPITAL | Age: 60
Setting detail: OUTPATIENT SURGERY
End: 2025-02-28
Attending: INTERNAL MEDICINE
Payer: COMMERCIAL

## 2025-02-28 ENCOUNTER — ANESTHESIA (OUTPATIENT)
Dept: GASTROENTEROLOGY | Facility: HOSPITAL | Age: 60
End: 2025-02-28
Payer: COMMERCIAL

## 2025-02-28 VITALS
OXYGEN SATURATION: 94 % | DIASTOLIC BLOOD PRESSURE: 57 MMHG | SYSTOLIC BLOOD PRESSURE: 109 MMHG | HEART RATE: 78 BPM | RESPIRATION RATE: 13 BRPM | TEMPERATURE: 98.5 F

## 2025-02-28 DIAGNOSIS — K52.9 IBD (INFLAMMATORY BOWEL DISEASE): ICD-10-CM

## 2025-02-28 DIAGNOSIS — Z80.0 FAMILY HISTORY OF COLON CANCER IN FATHER: ICD-10-CM

## 2025-02-28 DIAGNOSIS — Z86.0100 HX OF COLONIC POLYPS: ICD-10-CM

## 2025-02-28 PROCEDURE — 45380 COLONOSCOPY AND BIOPSY: CPT | Performed by: INTERNAL MEDICINE

## 2025-02-28 PROCEDURE — 45385 COLONOSCOPY W/LESION REMOVAL: CPT | Performed by: INTERNAL MEDICINE

## 2025-02-28 PROCEDURE — 88305 TISSUE EXAM BY PATHOLOGIST: CPT | Performed by: STUDENT IN AN ORGANIZED HEALTH CARE EDUCATION/TRAINING PROGRAM

## 2025-02-28 RX ORDER — LIDOCAINE HYDROCHLORIDE 20 MG/ML
INJECTION, SOLUTION EPIDURAL; INFILTRATION; INTRACAUDAL; PERINEURAL AS NEEDED
Status: DISCONTINUED | OUTPATIENT
Start: 2025-02-28 | End: 2025-02-28

## 2025-02-28 RX ORDER — PROPOFOL 10 MG/ML
INJECTION, EMULSION INTRAVENOUS CONTINUOUS PRN
Status: DISCONTINUED | OUTPATIENT
Start: 2025-02-28 | End: 2025-02-28

## 2025-02-28 RX ORDER — SODIUM CHLORIDE, SODIUM LACTATE, POTASSIUM CHLORIDE, CALCIUM CHLORIDE 600; 310; 30; 20 MG/100ML; MG/100ML; MG/100ML; MG/100ML
INJECTION, SOLUTION INTRAVENOUS CONTINUOUS PRN
Status: DISCONTINUED | OUTPATIENT
Start: 2025-02-28 | End: 2025-02-28

## 2025-02-28 RX ORDER — PROPOFOL 10 MG/ML
INJECTION, EMULSION INTRAVENOUS AS NEEDED
Status: DISCONTINUED | OUTPATIENT
Start: 2025-02-28 | End: 2025-02-28

## 2025-02-28 RX ADMIN — LIDOCAINE HYDROCHLORIDE 100 MG: 20 INJECTION, SOLUTION EPIDURAL; INFILTRATION; INTRACAUDAL; PERINEURAL at 12:25

## 2025-02-28 RX ADMIN — PROPOFOL 130 MCG/KG/MIN: 10 INJECTION, EMULSION INTRAVENOUS at 12:25

## 2025-02-28 RX ADMIN — Medication 40 MG: at 12:29

## 2025-02-28 RX ADMIN — PROPOFOL 100 MG: 10 INJECTION, EMULSION INTRAVENOUS at 12:25

## 2025-02-28 RX ADMIN — SODIUM CHLORIDE, SODIUM LACTATE, POTASSIUM CHLORIDE, AND CALCIUM CHLORIDE: .6; .31; .03; .02 INJECTION, SOLUTION INTRAVENOUS at 12:18

## 2025-02-28 NOTE — ANESTHESIA PREPROCEDURE EVALUATION
Procedure:  COLONOSCOPY    Relevant Problems   CARDIO   (+) Essential hypertension   (+) Mild mitral regurgitation   (+) Nonrheumatic aortic valve stenosis      GI/HEPATIC   (+) GERD (gastroesophageal reflux disease)      NEURO/PSYCH   (+) Moderate major depression, single episode (HCC)      PULMONARY   (+) Asthmatic bronchitis   (+) Cigarette smoker        Physical Exam    Airway    Mallampati score: II  TM Distance: >3 FB  Neck ROM: full     Dental       Cardiovascular  Cardiovascular exam normal    Pulmonary  Pulmonary exam normal     Other Findings  post-pubertal.      Anesthesia Plan  ASA Score- 2     Anesthesia Type- IV sedation with anesthesia with ASA Monitors.         Additional Monitors:     Airway Plan:            Plan Factors-    Chart reviewed. EKG reviewed.  Existing labs reviewed. Patient summary reviewed.                  Induction- intravenous.    Postoperative Plan-         Informed Consent- Anesthetic plan and risks discussed with patient.  I personally reviewed this patient with the CRNA. Discussed and agreed on the Anesthesia Plan with the CRNA..      NPO Status:  Vitals Value Taken Time   Date of last liquid 02/28/25 02/28/25 1158   Time of last liquid 0430 02/28/25 1158   Date of last solid 02/26/25 02/28/25 1158   Time of last solid 1800 02/28/25 1158

## 2025-02-28 NOTE — H&P
History and Physical - [facility]  Melissa Dickens 59 y.o. female MRN: [id number]                  HPI: Melissa Dickens is a 59 y.o. year old female who presents for history of colon polyp      REVIEW OF SYSTEMS: Per the HPI, and otherwise unremarkable.    Historical Information   Past Medical History:   Diagnosis Date    Asthmatic bronchitis 2024    Colon polyp     Depression     GERD (gastroesophageal reflux disease)     21 under control at this time, no meds    Heart murmur     HL (hearing loss)     Hyperlipidemia     Hypertension     Inflammatory bowel disease     Joint pain     left hip labral tear    Tinnitus     Ulcerative colitis (HCC)     21 no meds at this time    Vertigo      Past Surgical History:   Procedure Laterality Date     SECTION N/A 189,1993    COLONOSCOPY  2017    COLONOSCOPY      FL INJECTION LEFT HIP (NON ARTHROGRAM)  12/10/2018    FL INJECTION LEFT HIP (NON ARTHROGRAM)  2020    FL INJECTION LEFT HIP (NON ARTHROGRAM)  2022    FL INJECTION LEFT HIP (NON ARTHROGRAM)  2022    MAMMO (HISTORICAL)  2020    TX LAPAROSCOPY SURG CHOLECYSTECTOMY N/A 2024    Procedure: ROBOTIC CHOLECYSTECTOMY, POSSIBLE OPEN;  Surgeon: Hiram Salgado MD;  Location: [facility];  Service: General    TX TENDON SHEATH INCISION Left 2024    Procedure: RELEASE TRIGGER RING FINGER;  Surgeon: Sheila Dumont MD;  Location:  MAIN OR;  Service: Orthopedics    WISDOM TOOTH EXTRACTION       Social History   Social History     Substance and Sexual Activity   Alcohol Use Not Currently    Comment: rarely     Social History     Substance and Sexual Activity   Drug Use Never     Social History     Tobacco Use   Smoking Status Every Day    Current packs/day: 0.75    Average packs/day: 0.8 packs/day for 44.5 years (33.4 ttl pk-yrs)    Types: Cigarettes    Start date: 9/3/1980   Smokeless Tobacco Never   Tobacco Comments    started at age 16 per Yi pt  has not smoked the steady timefram of 30 yrs quit  for 3 yeaes at least 2 times     Family History   Problem Relation Age of Onset    Heart disease Mother     Diabetes Mother     Heart disease Father     Colon cancer Father     Breast cancer Sister         50s    No Known Problems Daughter     No Known Problems Maternal Grandmother     No Known Problems Maternal Grandfather     Uterine cancer Paternal Grandmother     No Known Problems Paternal Grandfather     Thyroid disease Brother     Cancer Brother         Thyroid cancer    No Known Problems Maternal Aunt     No Known Problems Maternal Aunt     No Known Problems Maternal Aunt     No Known Problems Paternal Aunt     No Known Problems Paternal Aunt        Meds/Allergies       Current Outpatient Medications:     buPROPion (WELLBUTRIN XL) 150 mg 24 hr tablet    escitalopram (LEXAPRO) 5 mg tablet    ezetimibe (ZETIA) 10 mg tablet    fluticasone (FLONASE) 50 mcg/act nasal spray    nystatin (MYCOSTATIN) cream    pravastatin (PRAVACHOL) 20 mg tablet    No Known Allergies    Objective     There were no vitals taken for this visit.      PHYSICAL EXAM    Gen: NAD  Head: NCAT  CV: RRR  CHEST: Clear  ABD: soft, NT/ND  EXT: no edema      ASSESSMENT/PLAN:  This is a 59 y.o. year old female here for colonoscopy, and she is stable and optimized for her procedure.

## 2025-02-28 NOTE — ANESTHESIA POSTPROCEDURE EVALUATION
Post-Op Assessment Note    CV Status:  Stable  Pain Score: 0    Pain management: adequate       Mental Status:  Sleepy   Hydration Status:  Euvolemic   PONV Controlled:  Controlled   Airway Patency:  Patent     Post Op Vitals Reviewed: Yes    No anethesia notable event occurred.    Staff: Anesthesiologist, CRNA           Last Filed PACU Vitals:  Vitals Value Taken Time   Temp     Pulse 82 02/28/25 1240   BP 93/54 02/28/25 1240   Resp 12 02/28/25 1240   SpO2 99 % 02/28/25 1240

## 2025-03-01 ENCOUNTER — APPOINTMENT (OUTPATIENT)
Dept: LAB | Facility: CLINIC | Age: 60
End: 2025-03-01
Payer: COMMERCIAL

## 2025-03-01 ENCOUNTER — OFFICE VISIT (OUTPATIENT)
Dept: LAB | Facility: CLINIC | Age: 60
End: 2025-03-01
Payer: COMMERCIAL

## 2025-03-01 DIAGNOSIS — M65.311 TRIGGER THUMB OF RIGHT HAND: ICD-10-CM

## 2025-03-01 DIAGNOSIS — Z01.812 PRE-PROCEDURE LAB EXAM: ICD-10-CM

## 2025-03-01 DIAGNOSIS — R22.31 MASS OF RIGHT HAND: ICD-10-CM

## 2025-03-01 LAB
ANION GAP SERPL CALCULATED.3IONS-SCNC: 9 MMOL/L (ref 4–13)
BUN SERPL-MCNC: 14 MG/DL (ref 5–25)
CALCIUM SERPL-MCNC: 9 MG/DL (ref 8.4–10.2)
CHLORIDE SERPL-SCNC: 107 MMOL/L (ref 96–108)
CO2 SERPL-SCNC: 23 MMOL/L (ref 21–32)
CREAT SERPL-MCNC: 1.11 MG/DL (ref 0.6–1.3)
GFR SERPL CREATININE-BSD FRML MDRD: 53 ML/MIN/1.73SQ M
GLUCOSE P FAST SERPL-MCNC: 103 MG/DL (ref 65–99)
POTASSIUM SERPL-SCNC: 4 MMOL/L (ref 3.5–5.3)
SODIUM SERPL-SCNC: 139 MMOL/L (ref 135–147)

## 2025-03-01 PROCEDURE — 80048 BASIC METABOLIC PNL TOTAL CA: CPT

## 2025-03-01 PROCEDURE — 93005 ELECTROCARDIOGRAM TRACING: CPT

## 2025-03-01 PROCEDURE — 36415 COLL VENOUS BLD VENIPUNCTURE: CPT

## 2025-03-04 ENCOUNTER — RESULTS FOLLOW-UP (OUTPATIENT)
Dept: GASTROENTEROLOGY | Facility: CLINIC | Age: 60
End: 2025-03-04

## 2025-03-04 LAB
ATRIAL RATE: 69 BPM
P AXIS: 42 DEGREES
PR INTERVAL: 162 MS
QRS AXIS: 10 DEGREES
QRSD INTERVAL: 78 MS
QT INTERVAL: 408 MS
QTC INTERVAL: 438 MS
T WAVE AXIS: 84 DEGREES
VENTRICULAR RATE: 69 BPM

## 2025-03-04 PROCEDURE — 93010 ELECTROCARDIOGRAM REPORT: CPT | Performed by: INTERNAL MEDICINE

## 2025-03-04 PROCEDURE — 88305 TISSUE EXAM BY PATHOLOGIST: CPT | Performed by: STUDENT IN AN ORGANIZED HEALTH CARE EDUCATION/TRAINING PROGRAM

## 2025-03-07 ENCOUNTER — ANESTHESIA EVENT (OUTPATIENT)
Age: 60
End: 2025-03-07
Payer: COMMERCIAL

## 2025-03-12 NOTE — PRE-PROCEDURE INSTRUCTIONS
Pre-Surgery Instructions:   Medication Instructions    buPROPion (WELLBUTRIN XL) 150 mg 24 hr tablet Take day of surgery.    Cholecalciferol (Vitamin D3) 50 MCG (2000 UT) CHEW Stop taking 7 days prior to surgery.    escitalopram (LEXAPRO) 5 mg tablet Take day of surgery.    ezetimibe (ZETIA) 10 mg tablet Take night before surgery    fluticasone (FLONASE) 50 mcg/act nasal spray Uses PRN- OK to take day of surgery    nystatin (MYCOSTATIN) cream Hold day of surgery.    pravastatin (PRAVACHOL) 20 mg tablet Take night before surgery    Medication instructions for day of surgery reviewed. Please take all instructed medications with only a sip of water.       You will receive a call one business day prior to surgery with an arrival time and hospital directions. If your surgery is scheduled on a Monday, the hospital will be calling you on the Friday prior to your surgery. If you have not heard from anyone by 8pm, please call the hospital supervisor through the hospital  at [phone]. ([place] [phone] or [place] [phone]).    Do not eat or drink anything after midnight the night before your surgery, including candy, mints, lifesavers, or chewing gum. Do not drink alcohol 24hrs before your surgery. Try not to smoke at least 24hrs before your surgery.       Follow the pre surgery showering instructions as listed in the “My Surgical Experience Booklet” or otherwise provided by your surgeon's office. Do not use a blade to shave the surgical area 1 week before surgery. It is okay to use a clean electric clippers up to 24 hours before surgery. Do not apply any lotions, creams, including makeup, cologne, deodorant, or perfumes after showering on the day of your surgery. Do not use dry shampoo, hair spray, hair gel, or any type of hair products.     No contact lenses, eye make-up, or artificial eyelashes. Remove nail polish, including gel polish, and any artificial, gel, or acrylic nails if possible. Remove all  jewelry including rings and body piercing jewelry.     Wear causal clothing that is easy to take on and off. Consider your type of surgery.    Keep any valuables, jewelry, piercings at home. Please bring any specially ordered equipment (sling, braces) if indicated.    Arrange for a responsible person to drive you to and from the hospital on the day of your surgery. Please confirm the visitor policy for the day of your procedure when you receive your phone call with an arrival time.     Call the surgeon's office with any new illnesses, exposures, or additional questions prior to surgery.    Please reference your “My Surgical Experience Booklet” for additional information to prepare for your upcoming surgery.

## 2025-03-13 PROCEDURE — NC001 PR NO CHARGE: Performed by: SURGERY

## 2025-03-13 NOTE — H&P
ASSESSMENT/PLAN:       Assessment & Plan  Mass of right hand  No evidence of calcification of bony abnormality in the area of painful mass  Will obtain ultrasound of the region for possible excision at the time of trigger thumb release  Orders:    XR hand 3+ vw right; Future    US extremity soft tissue; Future    Case request operating room: RELEASE TRIGGER FINGER, right thumb, Possible EXCISION BIOPSY TISSUE LESION/MASS UPPER EXTREMITY, right hand; Standing    Basic metabolic panel; Future    EKG 12 lead; Future     Trigger thumb of right hand  Patient previously underwent other trigger finger injections without success and ended up having trigger finger surgery reasonable to proceed with trigger finger release of the right thumb without prior injection  Orders:    Case request operating room: RELEASE TRIGGER FINGER, right thumb, Possible EXCISION BIOPSY TISSUE LESION/MASS UPPER EXTREMITY, right hand; Standing    Basic metabolic panel; Future    EKG 12 lead; Future     Pre-procedure lab exam     Orders:    Basic metabolic panel; Future    EKG 12 lead; Future              59 y.o. female with a right trigger thumb and a suspected right hand mass to 4th webspace      Right hand x-rays were performed in the office and reviewed. The etiology of above diagnosis was discussed. On PE she has a mass sitting near the MCP joint ulnarly of the ring finger MCP joint in the 4th webspace. We discussed this is likely a retinacular cyst vs ganglion cyst. Right trigger thumb release and possible right hand mass excision was discussed at length including risks and benefits. Common risks include bleeding, infection, injury to nearby structures (vessels, nerves, tendons, ligaments), blood clots (deep vein thrombosis / pulmonary embolus), and wound healing problems. Infection may result in an infection of the blood stream and/or the need for oral or intravenous antibiotics. Additional risks include impaired limb function, loss of limb  and/or failure to achieve desired results. There is a risk for unacceptable cosmetic results (such as scarring). Surgery of the hand is associated with bleeding, infection, scar, pain, wound healing problems, damage to nerves, arteries, tendons, ligaments, failure to give desired result, instability, nonunion, malunion hardware issues, and need for more surgery. A STAT US of the right 4th webspace mass was ordered  to further evaluate the mass well as to determine if this is solid or cystic for surgical planning purposes. US must be completed prior to surgery. Melissa elected to proceed with a right trigger thumb release and possible right hand mass excision and informed electronic surgical consent was signed. Post operative instructions/expectations were reviewed and provided in AVS. BMP and EKG will be obtained prior to surgery as well as the right hand US. I will see her back in the office 10-14 days post op for suture removal.         The patient verbalized understanding of exam findings and treatment plan. We engaged in the shared decision-making process and treatment options were discussed at length with the patient. Surgical and conservative management discussed today along with risks and benefits.     Diagnoses and all orders for this visit:     Mass of right hand  -     XR hand 3+ vw right; Future  -     Cancel: US extremity soft tissue; Future  -     US extremity soft tissue; Future     Trigger thumb of right hand        Ganglion Cyst Excision: The anatomy and physiology of the ganglion was discussed with the patient today in the office.  Fluid leaking out of the joint surface typically creates a small sac, which can enlarge and cause pain or limitation of motion.  Treatment options include observation, aspiration, or surgical incision were discussed with the patient today.  Observation typically lead to resolution and approximately 10% of patients, aspiration least resolution approximately 50% of patients,  and surgical excision lead to resolution in approximately 97% of patients.  After discussion with the patient today, the patient voiced understanding of all treatment options.The patient has elected excision of the ganglion.The risks and benefits of the procedure were explained to the patient, which include, but are not limited to: Bleeding, infection, recurrence, pain, scar, damage to tendons, damage to nerves, and damage to blood vessels, failure to give desired results and complications related to anesthesia.  These risks, along with alternative conservative treatment options, and postoperative protocols were voiced back and understood by the patient.  All questions were answered to the patient's satisfaction.  The patient agrees to comply with a standard postoperative protocol, and is willing to proceed.  Education was provided via written and auditory forms.  There were no barriers to learning. Written handouts regarding wound care, incision and scar care, and general preoperative information was provided to the patient.  Prior to surgery, the patient may be requested to stop all anti-inflammatory medications.  Prophylactic aspirin, Plavix, and Coumadin may be allowed to be continued.  Medications including vitamin E., ginkgo, and fish oil are requested to be stopped approximately one week prior to surgery.  Hypertensive medications and beta blockers, if taken, should be continued., Trigger Finger Release: The anatomy and physiology of trigger finger was discussed with the patient today in the office.  Edema and increased contact pressure within the flexor tendons at the A1 pulley can cause pain, crepitation, and limitation of function.  Treatment options include resting MP blocking splints to decrease edema, oral anti-inflammatory medications, home or formal therapy exercises, up to 2 steroid injections or surgical release.  While majority of patients do respond to conservative treatment, up to 20% may require  surgical release.  The patient has elected release of the trigger finger.  The patient has elected to undergo a release of the A1 pulley (trigger finger).  A small incision will be made over the palmar aspect of the hand, the tendon sheath holding the flexor tendons will be released.  In the postoperative period, light activities are allowed immediately, driving is allowed when narcotic medication has stopped, and the incision may get wet after 2 days.  Heavy activities (lifting more than approximately 10 pounds) will be allowed after the follow up appointment in 1-2 weeks.  While the pain and discomfort within the wrist typically improves rapidly, some residual discomfort may be present for up to 6 weeks.  The nodule that is typically palpable in the palmar aspect of the hand will not be removed, as this would necessitate removal of a portion of the flexor tendon, however the catching, clicking, and locking should resolve.  Approximate success rate is 98%.The risks and benefits of the procedure were explained to the patient, which include, but are not limited to: Bleeding, infection, recurrence, pain, scar, damage to tendons, damage to nerves, and damage to blood vessels, need for future surgery and complications related to anesthesia.  If bony work is done, risks also include malunion and nonunion.  These risks, along with alternative conservative treatment options, and postoperative protocols were voiced back and understood by the patient.  All questions were answered to the patient's satisfaction.  The patient agrees to comply with a standard postoperative protocol, and is willing to proceed.  Education was provided via written and auditory forms.  There were no barriers to learning. Written handouts regarding wound care, incision and scar care, and general preoperative information, as well as risks and benefits were provided to the patient., and Standard Consent: The risks and benefits of the procedure were  explained to the patient, which include, but are not limited to: Bleeding, infection, recurrence, pain, scar, damage to tendons, damage to nerves, and damage to blood vessels, failure to give desired results and complications related to anesthesia.  These risks, along with alternative conservative treatment options, and postoperative protocols were voiced back and understood by the patient.  All questions were answered to the patient's satisfaction.  The patient agrees to comply with a standard postoperative protocol, and is willing to proceed.  Education was provided via written and auditory forms.  There were no barriers to learning. Written handouts regarding wound care, incision and scar care, and general preoperative information was provided to the patient.  Prior to surgery, the patient may be requested to stop all anti-inflammatory medications.  Prophylactic aspirin, Plavix, and Coumadin may be allowed to be continued.  Medications including vitamin E., ginkgo, and fish oil are requested to be stopped approximately one week prior to surgery.  Hypertensive medications and beta blockers, if taken, should be continued.     Follow Up:  Return for US to be completed prior to SX, fu 10-14 days PO.        To Do Next Visit:  Sutures out     ____________________________________________________________________________________________________________________________________________        CHIEF COMPLAINT:      Chief Complaint   Patient presents with    Right Thumb - Triggering         SUBJECTIVE:  Melissa Dickens is a 59 y.o. year old RHD female who presents to the office for evaluation of her right thumb. She notes her right thumb has been triggering for approx. 2 months. She is able to flick open the finger when locking occurs. She notes pain to her A1 pulley area. She also notes a mass to her 4th webspace which is painful if it is touched or accidentally bumped. This has been present for approx. 1 month and has not  changed in size since she first noticed it.         I have personally reviewed all the relevant PMH, PSH, SH, FH, Medications and allergies.      PAST MEDICAL HISTORY:       Past Medical History:   Diagnosis Date    Asthmatic bronchitis 2024    Colon polyp      Depression     GERD (gastroesophageal reflux disease)       19 under control at this time, no meds    Heart murmur     HL (hearing loss)      Hyperlipidemia      Hypertension      Inflammatory bowel disease      Joint pain       left hip labral tear    Tinnitus      Ulcerative colitis (HCC)       19 no meds at this time    Vertigo           PAST SURGICAL HISTORY:        Past Surgical History:   Procedure Laterality Date     SECTION N/A 189,1993    COLONOSCOPY   2017    COLONOSCOPY        FL INJECTION LEFT HIP (NON ARTHROGRAM)   12/10/2018    FL INJECTION LEFT HIP (NON ARTHROGRAM)   2020    FL INJECTION LEFT HIP (NON ARTHROGRAM)   2022    FL INJECTION LEFT HIP (NON ARTHROGRAM)   2022    MAMMO (HISTORICAL)   2020    [place] LAPAROSCOPY SURG CHOLECYSTECTOMY N/A 2024     Procedure: ROBOTIC CHOLECYSTECTOMY, POSSIBLE OPEN;  Surgeon: Hiram Salgado MD;  Location:  MAIN OR;  Service: General    [place] TENDON SHEATH INCISION Left 2024     Procedure: RELEASE TRIGGER RING FINGER;  Surgeon: Sheila Dumont MD;  Location:  MAIN OR;  Service: Orthopedics    WISDOM TOOTH EXTRACTION             FAMILY HISTORY:        Family History   Problem Relation Age of Onset    Heart disease Mother      Diabetes Mother      Heart disease Father      Colon cancer Father      Breast cancer Sister           50s    No Known Problems Daughter      No Known Problems Maternal Grandmother      No Known Problems Maternal Grandfather      Uterine cancer Paternal Grandmother      No Known Problems Paternal Grandfather      Thyroid disease Brother      Cancer Brother           Thyroid cancer    No Known Problems Maternal Aunt      No Known  Problems Maternal Aunt      No Known Problems Maternal Aunt      No Known Problems Paternal Aunt      No Known Problems Paternal Aunt           SOCIAL HISTORY:  Social History            Tobacco Use    Smoking status: Every Day       Current packs/day: 0.75       Average packs/day: 0.8 packs/day for 44.4 years (33.3 ttl pk-yrs)       Types: Cigarettes       Start date: 9/3/1980    Smokeless tobacco: Never    Tobacco comments:       started at age 16 per Yi pt has not smoked the steady timefram of 30 yrs quit  for 3 yeaes at least 2 times   Vaping Use    Vaping status: Never Used   Substance Use Topics    Alcohol use: Not Currently       Comment: rarely    Drug use: Never         MEDICATIONS:     Current Outpatient Medications:     buPROPion (WELLBUTRIN XL) 150 mg 24 hr tablet, TAKE 1 TABLET EVERY MORNING, Disp: 90 tablet, Rfl: 1    escitalopram (LEXAPRO) 5 mg tablet, TAKE 1 TABLET DAILY, Disp: 90 tablet, Rfl: 1    ezetimibe (ZETIA) 10 mg tablet, TAKE 1 TABLET DAILY, Disp: 90 tablet, Rfl: 3    fluticasone (FLONASE) 50 mcg/act nasal spray, 2 sprays into each nostril daily, Disp: , Rfl:     nystatin (MYCOSTATIN) cream, Apply topically 2 (two) times a day, Disp: 60 g, Rfl: 1    pravastatin (PRAVACHOL) 20 mg tablet, Take 1 tablet (20 mg total) by mouth daily at bedtime, Disp: 100 tablet, Rfl: 3     ALLERGIES:  No Known Allergies     REVIEW OF SYSTEMS:  Review of Systems   Constitutional:  Negative for chills, fever and unexpected weight change.   HENT:  Negative for hearing loss, nosebleeds and sore throat.    Eyes:  Negative for pain, redness and visual disturbance.   Respiratory:  Negative for cough, shortness of breath and wheezing.    Cardiovascular:  Negative for chest pain, palpitations and leg swelling.   Gastrointestinal:  Negative for abdominal pain, nausea and vomiting.   Endocrine: Negative for polydipsia and polyuria.   Genitourinary:  Negative for difficulty urinating and hematuria.   Musculoskeletal:   Negative for arthralgias, joint swelling and myalgias.   Skin:  Negative for rash and wound.   Neurological:  Negative for dizziness, numbness and headaches.   Psychiatric/Behavioral:  Negative for decreased concentration, dysphoric mood and suicidal ideas. The patient is not nervous/anxious.          VITALS:  There were no vitals filed for this visit.        _____________________________________________________  PHYSICAL EXAMINATION:  General: well developed and well nourished, alert, oriented times 3, and appears comfortable  Psychiatric: Normal  HEENT: Normocephalic, Atraumatic Trachea Midline, No torticollis  Pulmonary: No audible wheezing or respiratory distress   Cardiovascular: No pitting edema, 2+ radial pulse   Abdominal/GI: abdomen non tender, non distended   Skin: No erythema, lacerations, fluctation, ulcerations  Neurovascular: Sensation Intact to the Median, Ulnar, Radial Nerve, Motor Intact to the Median, Ulnar, Radial Nerve, and Pulses Intact  Musculoskeletal: Normal, except as noted in detailed exam and in HPI.        MUSCULOSKELETAL EXAMINATION:     Right thumb:  Negative palpable nodule over the A1 pulley.  Positive tenderness to palpation over A1 pulley. Negative catching. Positive clicking.       Palpable mass sitting near the MCP joint of the ring finger in the 4th webspace, mass is significantly tender to palpation      ___________________________________________________     STUDIES REVIEWED:  I have personally reviewed and interpreted  AP lateral and oblique radiographs of right hand   which demonstrate no calcification in  area of mass

## 2025-03-14 ENCOUNTER — ANESTHESIA (OUTPATIENT)
Age: 60
End: 2025-03-14
Payer: COMMERCIAL

## 2025-03-14 ENCOUNTER — HOSPITAL ENCOUNTER (OUTPATIENT)
Age: 60
Setting detail: OUTPATIENT SURGERY
Discharge: HOME | End: 2025-03-14
Attending: SURGERY | Admitting: SURGERY
Payer: COMMERCIAL

## 2025-03-14 VITALS
SYSTOLIC BLOOD PRESSURE: 168 MMHG | HEART RATE: 69 BPM | DIASTOLIC BLOOD PRESSURE: 75 MMHG | WEIGHT: 183 LBS | HEIGHT: 62 IN | BODY MASS INDEX: 33.68 KG/M2 | OXYGEN SATURATION: 98 % | RESPIRATION RATE: 18 BRPM | TEMPERATURE: 98 F

## 2025-03-14 DIAGNOSIS — Z98.890 S/P TRIGGER FINGER RELEASE: Primary | ICD-10-CM

## 2025-03-14 PROCEDURE — 26055 INCISE FINGER TENDON SHEATH: CPT

## 2025-03-14 PROCEDURE — 26075 EXPLORE/TREAT FINGER JOINT: CPT

## 2025-03-14 PROCEDURE — 26055 INCISE FINGER TENDON SHEATH: CPT | Performed by: SURGERY

## 2025-03-14 PROCEDURE — 26075 EXPLORE/TREAT FINGER JOINT: CPT | Performed by: SURGERY

## 2025-03-14 RX ORDER — CHLORHEXIDINE GLUCONATE ORAL RINSE 1.2 MG/ML
15 SOLUTION DENTAL ONCE
Status: COMPLETED | OUTPATIENT
Start: 2025-03-14 | End: 2025-03-14

## 2025-03-14 RX ORDER — HYDROCODONE BITARTRATE AND ACETAMINOPHEN 5; 325 MG/1; MG/1
1 TABLET ORAL SEE ADMIN INSTRUCTIONS
Qty: 5 TABLET | Refills: 0 | Status: SHIPPED | OUTPATIENT
Start: 2025-03-14

## 2025-03-14 RX ORDER — CEFAZOLIN SODIUM 2 G/50ML
2000 SOLUTION INTRAVENOUS ONCE
Status: COMPLETED | OUTPATIENT
Start: 2025-03-14 | End: 2025-03-14

## 2025-03-14 RX ORDER — FENTANYL CITRATE 50 UG/ML
INJECTION, SOLUTION INTRAMUSCULAR; INTRAVENOUS AS NEEDED
Status: DISCONTINUED | OUTPATIENT
Start: 2025-03-14 | End: 2025-03-14

## 2025-03-14 RX ORDER — LIDOCAINE HYDROCHLORIDE 10 MG/ML
INJECTION, SOLUTION EPIDURAL; INFILTRATION; INTRACAUDAL; PERINEURAL AS NEEDED
Status: DISCONTINUED | OUTPATIENT
Start: 2025-03-14 | End: 2025-03-14

## 2025-03-14 RX ORDER — MAGNESIUM HYDROXIDE 1200 MG/15ML
LIQUID ORAL AS NEEDED
Status: DISCONTINUED | OUTPATIENT
Start: 2025-03-14 | End: 2025-03-14 | Stop reason: HOSPADM

## 2025-03-14 RX ORDER — PROPOFOL 10 MG/ML
INJECTION, EMULSION INTRAVENOUS CONTINUOUS PRN
Status: DISCONTINUED | OUTPATIENT
Start: 2025-03-14 | End: 2025-03-14

## 2025-03-14 RX ORDER — FENTANYL CITRATE/PF 50 MCG/ML
25 SYRINGE (ML) INJECTION
Status: DISCONTINUED | OUTPATIENT
Start: 2025-03-14 | End: 2025-03-14 | Stop reason: HOSPADM

## 2025-03-14 RX ORDER — ONDANSETRON 2 MG/ML
INJECTION INTRAMUSCULAR; INTRAVENOUS AS NEEDED
Status: DISCONTINUED | OUTPATIENT
Start: 2025-03-14 | End: 2025-03-14

## 2025-03-14 RX ORDER — PROPOFOL 10 MG/ML
INJECTION, EMULSION INTRAVENOUS AS NEEDED
Status: DISCONTINUED | OUTPATIENT
Start: 2025-03-14 | End: 2025-03-14

## 2025-03-14 RX ORDER — SODIUM CHLORIDE 9 MG/ML
75 INJECTION, SOLUTION INTRAVENOUS CONTINUOUS
Status: DISCONTINUED | OUTPATIENT
Start: 2025-03-14 | End: 2025-03-14 | Stop reason: HOSPADM

## 2025-03-14 RX ADMIN — LIDOCAINE HYDROCHLORIDE 50 MG: 10 INJECTION, SOLUTION EPIDURAL; INFILTRATION; INTRACAUDAL; PERINEURAL at 10:08

## 2025-03-14 RX ADMIN — PROPOFOL 100 MG: 10 INJECTION, EMULSION INTRAVENOUS at 10:08

## 2025-03-14 RX ADMIN — PROPOFOL 50 MG: 10 INJECTION, EMULSION INTRAVENOUS at 10:22

## 2025-03-14 RX ADMIN — ONDANSETRON 4 MG: 2 INJECTION INTRAMUSCULAR; INTRAVENOUS at 10:08

## 2025-03-14 RX ADMIN — FENTANYL CITRATE 50 MCG: 50 INJECTION INTRAMUSCULAR; INTRAVENOUS at 10:23

## 2025-03-14 RX ADMIN — PROPOFOL 120 MCG/KG/MIN: 10 INJECTION, EMULSION INTRAVENOUS at 10:09

## 2025-03-14 RX ADMIN — FENTANYL CITRATE 25 MCG: 50 INJECTION INTRAMUSCULAR; INTRAVENOUS at 10:41

## 2025-03-14 RX ADMIN — CEFAZOLIN SODIUM 2000 MG: 2 SOLUTION INTRAVENOUS at 10:03

## 2025-03-14 RX ADMIN — FENTANYL CITRATE 25 MCG: 50 INJECTION INTRAMUSCULAR; INTRAVENOUS at 10:38

## 2025-03-14 RX ADMIN — SODIUM CHLORIDE 75 ML/HR: 0.9 INJECTION, SOLUTION INTRAVENOUS at 08:31

## 2025-03-14 RX ADMIN — CHLORHEXIDINE GLUCONATE 15 ML: 1.2 SOLUTION ORAL at 08:31

## 2025-03-14 NOTE — ANESTHESIA PREPROCEDURE EVALUATION
Procedure:  RELEASE TRIGGER FINGER, right thumb (Right: Hand)  Possible EXCISION BIOPSY TISSUE LESION/MASS UPPER EXTREMITY, right hand (Right: Hand)    Relevant Problems   CARDIO   (+) Essential hypertension   (+) Mild mitral regurgitation   (+) Nonrheumatic aortic valve stenosis      GI/HEPATIC   (+) GERD (gastroesophageal reflux disease)      NEURO/PSYCH   (+) Moderate major depression, single episode (HCC)      PULMONARY   (+) Asthmatic bronchitis   (+) Cigarette smoker      Ear/Nose/Throat   (+) Benign paroxysmal positional vertigo      Rheumatology   (+) Degenerative tear of acetabular labrum of left hip   (+) Right patellofemoral syndrome      Dermatology   (+) Candida infection of flexural skin      Other   (+) Class 1 obesity due to excess calories with serious comorbidity and body mass index (BMI) of 33.0 to 33.9 in adult   (+) Dyslipidemia   (+) Family history of diabetes mellitus (DM)   (+) History of thyroid disorder     Lab Results   Component Value Date    SODIUM 139 03/01/2025    K 4.0 03/01/2025     03/01/2025    CO2 23 03/01/2025    AGAP 9 03/01/2025    BUN 14 03/01/2025    CREATININE 1.11 03/01/2025    GLUC 87 09/12/2024    GLUF 103 (H) 03/01/2025    CALCIUM 9.0 03/01/2025    AST 17 09/12/2024    ALT 21 09/12/2024    ALKPHOS 76 09/12/2024    TP 6.3 (L) 09/12/2024    TBILI 0.51 09/12/2024    EGFR 53 03/01/2025     Lab Results   Component Value Date    WBC 6.25 12/02/2024    HGB 14.2 12/02/2024    HCT 43.4 12/02/2024    MCV 96 12/02/2024     12/02/2024          Physical Exam    Airway    Mallampati score: II  TM Distance: >3 FB  Neck ROM: full     Dental       Cardiovascular      Pulmonary      Other Findings  post-pubertal.      Anesthesia Plan  ASA Score- 2     Anesthesia Type- IV sedation with anesthesia with ASA Monitors.         Additional Monitors:     Airway Plan:            Plan Factors-Exercise tolerance (METS): >4 METS.    Chart reviewed. EKG reviewed. Imaging results reviewed.  Existing labs reviewed. Patient summary reviewed.                  Induction- intravenous.    Postoperative Plan-         Informed Consent- Anesthetic plan and risks discussed with patient.  I personally reviewed this patient with the CRNA. Discussed and agreed on the Anesthesia Plan with the CRNA..      NPO Status:  Vitals Value Taken Time   Date of last liquid 03/13/25 03/14/25 0813   Time of last liquid 2300 03/14/25 0813   Date of last solid 03/13/25 03/14/25 0813   Time of last solid 2230 03/14/25 0813

## 2025-03-14 NOTE — ANESTHESIA POSTPROCEDURE EVALUATION
Post-Op Assessment Note    CV Status:  Stable    Pain management: adequate       Mental Status:  Alert and awake   Hydration Status:  Euvolemic   PONV Controlled:  Controlled   Airway Patency:  Patent     Post Op Vitals Reviewed: Yes    No anethesia notable event occurred.    Staff: Anesthesiologist           Last Filed PACU Vitals:  Vitals Value Taken Time   Temp 98 °F (36.7 °C) 03/14/25 1115   Pulse 68 03/14/25 1115   /64 03/14/25 1115   Resp 18 03/14/25 1115   SpO2 97 % 03/14/25 1115       Modified Jv:     Vitals Value Taken Time   Activity 2 03/14/25 1115   Respiration 2 03/14/25 1115   Circulation 2 03/14/25 1115   Consciousness 2 03/14/25 1115   Oxygen Saturation 2 03/14/25 1115     Modified Jv Score: 10

## 2025-03-14 NOTE — ANESTHESIA POSTPROCEDURE EVALUATION
Left message for spouse that we were notified that TCM appt was cancelled.   We would still like to touch base with him on how pt is doing.  Please call office back and ask to speak with one of the nurses.    Post-Op Assessment Note    CV Status:  Stable  Pain Score: 0    Pain management: adequate       Mental Status:  Alert and awake   Hydration Status:  Euvolemic   PONV Controlled:  Controlled   Airway Patency:  Patent     Post Op Vitals Reviewed: Yes    No anethesia notable event occurred.    Staff: CRNA           Last Filed PACU Vitals:  Vitals Value Taken Time   Temp 97.5    Pulse 68 03/14/25 1046   /59    Resp 23 03/14/25 1046   SpO2 98 % 03/14/25 1046   Vitals shown include unfiled device data.

## 2025-03-14 NOTE — INTERVAL H&P NOTE
H&P reviewed. After examining the patient I find no changes in the patients condition since the H&P had been written. Mass OF US , possible small cyst . Will proceed with excision

## 2025-03-14 NOTE — OP NOTE
OPERATIVE REPORT  PATIENT NAME: Melissa Dickens  :  1965  MRN: [id number]  Pt Location: [facility]    SURGERY DATE: 25    Surgeons and Role:     * Sheila Dumont MD - Primary     * Monalisa Diaz PA-C - Assisting    Pre-Op Diagnosis:  Mass of right hand [R22.31]  Trigger thumb of right hand [M65.311]    Post-Op Diagnosis Codes:     * Mass of right hand [R22.31]     * Trigger thumb of right hand [M65.311]    Procedure(s):  RELEASE TRIGGER FINGER, right thumb (Right)  EXPLORATION, right hand (Right)    Specimen(s):  No specimens collected during this procedure.    Estimated Blood Loss:   Minimal    Anesthesia Type:   IV Sedation with Anesthesia    IMPLANTS:  * No implants in log *    PERIOPERATIVE ANTIBIOTICS:    cefazolin, 2 grams    Tourniquet Time: 14 min  at 250 mmHg          Operative Indications:  The patient has a history of right trigger thumb and right ring finger palpable mass in the fifth webspace at the MP joint that was recalcitrant to conservative management.  The decision was made to bring the patient to the operating room for right trigger thumb release right ring finger exploration mass excision risks of the procedure were explained which include, but are not limited to bleeding; infection; damage to nerves, arteries,veins, tendons; scar; pain; need for reoperation; failure to give desired result; and risks of anaesthesia.  All questions were answered to satisfaction and they were willing to proceed.         Operative Findings:  Hypertrophy A1 pulley  Area of palpable mass consistent with capsular hypertrophy and edge of proximal ulnar phalanx base, no discrete mass identified    Complications:   None    Procedure and Technique:  After the patient, site, and procedure were identified, the patient was brought into the operating room in a supine position.  Local anaesthesia and sedation were provided.  A well padded tourniquet was applied to the extremity, set at 250 mmHg.  The  right  upper extremity was then prepped and drapped in a normal, sterile, orthopedic fashion.    A  Horizontal incision is made in line with the proximal crease of the right  thumb, overlying the A1 pulley Dissection was  carried down under loupe magnification. Skin and subcutaneous tissues were sharply incised. The ulnar digital nerve was identified and protected The tissue was elevated off the A1 pulley. The A1 pulley was  identified and incised. There was no retinacular cyst noted. Tenosynovectomy was not carried out. The FPL was noted to have  scuffing at the volar surface     1 cm longitudinal incision was made overlying the ulnar aspect of the ulnar joint in the area of the palpable mass.  Full-thickness skin flaps were elevated under loupe magnification, a cluster of hypertrophic adipose cells was noted but no discrete mass was identified in the superficial tissue.  Inspection of the area of mass did not demonstrate a discrete ganglion cyst however at the ulnar base of the proximal phalanx of the ring finger there was capsular hypertrophy and a prominent bony shoulder likely consistent with prior decompressed cyst.  No excision was performed      At the completion of the procedure, hemostasis was obtained with cautery and direct pressure.  The wounds were copiously irrigated with sterile solution.  The wounds were closed with Prolene.  Sterile dressings were applied, including Xeroform, gauze, tweeners, webril, ACE.  Please note, all sponge, needle, and instrument counts were correct prior to closure.  Loupe magnification was utilized.  The patient tolerated the procedure well.     I was present for the entire procedure., A qualified resident physician was not available., and A physician assistant was required during the procedure for retraction, tissue handling, dissection and suturing.    Patient Disposition:  PACU     SIGNATURE: Sheila Dumont MD  DATE: 03/14/25  TIME: 10:44 AM

## 2025-03-26 ENCOUNTER — OFFICE VISIT (OUTPATIENT)
Dept: OBGYN CLINIC | Facility: CLINIC | Age: 60
End: 2025-03-26

## 2025-03-26 DIAGNOSIS — R22.31 MASS OF RIGHT HAND: ICD-10-CM

## 2025-03-26 DIAGNOSIS — M65.311 TRIGGER THUMB OF RIGHT HAND: Primary | ICD-10-CM

## 2025-03-26 PROCEDURE — 99024 POSTOP FOLLOW-UP VISIT: CPT | Performed by: SURGERY

## 2025-03-28 DIAGNOSIS — F32.1 MODERATE MAJOR DEPRESSION, SINGLE EPISODE (HCC): ICD-10-CM

## 2025-03-28 DIAGNOSIS — F17.210 CIGARETTE SMOKER: ICD-10-CM

## 2025-03-28 RX ORDER — BUPROPION HYDROCHLORIDE 150 MG/1
150 TABLET ORAL EVERY MORNING
Qty: 90 TABLET | Refills: 1 | Status: SHIPPED | OUTPATIENT
Start: 2025-03-28

## 2025-03-28 RX ORDER — ESCITALOPRAM OXALATE 5 MG/1
5 TABLET ORAL DAILY
Qty: 90 TABLET | Refills: 1 | Status: SHIPPED | OUTPATIENT
Start: 2025-03-28

## 2025-03-28 NOTE — PROGRESS NOTES
Assessment/Plan:  Patient ID: Melissa Dickens 59 y.o. female   Surgery: RELEASE TRIGGER FINGER, right thumb - Right and EXPLORATION, right hand - Right  Date of Surgery: 3/14/2025      Assessment & Plan  Trigger thumb of right hand  Raised scar  Scar modalities  OT for putty   Orders:    Ambulatory Referral to Occupational Therapy; Future    Mass of right hand    Orders:    Ambulatory Referral to Occupational Therapy; Future            CHIEF COMPLAINT:  Chief Complaint   Patient presents with    Post-op     Patient is here for suture removal          SUBJECTIVE:  Melissa Dickens is a 59 y.o. year old female who presents for follow up after RELEASE TRIGGER FINGER, right thumb - Right and EXPLORATION, right hand - Right. Today patient has no significant pain, notes thickening of scar at thumb based .       PHYSICAL EXAMINATION:  General: well developed and well nourished, alert, oriented times 3, and appears comfortable  Psychiatric: Normal    MUSCULOSKELETAL EXAMINATION:  Incision: Clean, dry, intact  Surgery Site: normal, no evidence of infection   Range of Motion: As expected  Neurovascular status: Neuro intact, good cap refill  Activity Restrictions: No restrictions  Sutures out      STUDIES REVIEWED:  NA    LABS REVIEWED:    HgA1c:   Lab Results   Component Value Date    HGBA1C 5.4 08/27/2022     BMP:   Lab Results   Component Value Date    CALCIUM 9.0 03/01/2025    K 4.0 03/01/2025    CO2 23 03/01/2025     03/01/2025    BUN 14 03/01/2025    CREATININE 1.11 03/01/2025           Scribe Attestation      I,:   am acting as a scribe while in the presence of the attending physician.:       I,:   personally performed the services described in this documentation    as scribed in my presence.:

## 2025-03-31 ENCOUNTER — EVALUATION (OUTPATIENT)
Dept: OCCUPATIONAL THERAPY | Facility: CLINIC | Age: 60
End: 2025-03-31
Payer: COMMERCIAL

## 2025-03-31 DIAGNOSIS — M65.311 TRIGGER THUMB OF RIGHT HAND: Primary | ICD-10-CM

## 2025-03-31 DIAGNOSIS — R22.31 MASS OF RIGHT HAND: ICD-10-CM

## 2025-03-31 PROCEDURE — 97110 THERAPEUTIC EXERCISES: CPT

## 2025-03-31 PROCEDURE — 97165 OT EVAL LOW COMPLEX 30 MIN: CPT

## 2025-03-31 NOTE — PROGRESS NOTES
OT Evaluation     Today's date: 3/31/2025  Patient name: Melissa Dickens  : 1965  MRN: [id number]  Referring provider: Sheila Dumont MD  Dx:   Encounter Diagnosis     ICD-10-CM    1. Trigger thumb of right hand  M65.311 Ambulatory Referral to Occupational Therapy      2. Mass of right hand  R22.31 Ambulatory Referral to Occupational Therapy          Start Time: 1300  Stop Time: 1345  Total time in clinic (min): 45 minutes    Assessment  Impairments: abnormal or restricted ROM, abnormal movement, activity intolerance, impaired physical strength, lacks appropriate home exercise program, pain with function and weight-bearing intolerance  Symptom irritability: low    Assessment details: Patient presenting to OP OT Hand therapy services s/p trigger finger release of the R thumb. Patient reports that prior to surgery the digit had been triggering for 2 months with no known mechanism of injury. The surgical incision was inspected and found to be healing well with moderate scar tissue formation. The sutures were removed on 3/26/25 by orthopedics. Patient reports no pain. Wrist and thumb ROM is decreased as compared to the contralateral side. FPL tightness noted. Digit ROM is WNL and they can form a full composite fist. /pinch strength is decreased in the normal test position. No triggering is occurring at this time. Therapist educated patient on proper joint mechanics and protective techniques. Patient was receptive to the education and was able to display understanding. Patient was provided a custom HEP and instructed on how to complete it. See below for a more detailed assessment.     Understanding of Dx/Px/POC: good     Prognosis: good    Goals  STG:    Patient will display increased thumb AROM by a combined total of >20 degrees in all joints by 4 weeks    Patient will display decreasing scar tissue density in 4 weeks    LTG:    Patient will display ROM WFL in the thumb in 8 weeks or  "discharge    Patient will increase FOTO score by >20 points in 8 weeks or discharge    Plan  Patient would benefit from: custom splinting and OT eval  Referral necessary: No  Planned modality interventions: ultrasound, thermotherapy: paraffin bath and thermotherapy: hydrocollator packs    Planned therapy interventions: balance/weight bearing training, coordination, home exercise program, functional ROM exercises, flexibility, IASTM, manual therapy, joint mobilization, orthotic fitting/training, strengthening, stretching, therapeutic activities and therapeutic exercise    Frequency: 1x week  Duration in weeks: 10  Plan of Care beginning date: 3/31/2025  Plan of Care expiration date: 2025  Treatment plan discussed with: patient  Plan details: Patient would benefit from skilled OP OT hand therapy services 1x per week for 8 weeks to increase ROM, decrease scar tissue, and return to full functional status.         Subjective Evaluation    History of Present Illness  Date of surgery: 3/14/2025  Mechanism of injury: surgery  Mechanism of injury: Surgery: Trigger Finger Release R thumb    Subjective:  \"It is good\". \"There is a lot of scar tissue\". \"The motion is good\". \"The scar tissue seems to be blocking the motion\". \"It was triggering for around 2 months\". \"I woke up and it just started\". \"It would lock and it was mostly the top knuckle (IP joint)\". \"Everything is good\".\"I have less pain then before surgery\". \"I get numbness and tingling around once a day and it resolves quickly\". \"I don't wake up with numbness\". \"The numbness has started after the surgery\".          Recurrent probem    Quality of life: good    Patient Goals  Patient goals for therapy: decreased edema, increased motion and increased strength    Pain  No pain reported  Current pain ratin  At best pain ratin  At worst pain ratin    Social Support    Employment status: working (Dental )  Hand dominance: " right    Treatments  Current treatment: occupational therapy        Objective     Observations     Right Wrist/Hand   Positive for adhesive scar.     Additional Observation Details  R:  Dense scar tissue at the base of the 1st digit    Neurological Testing     Sensation     Wrist/Hand   Left   Intact: light touch    Right   Intact: light touch    Active Range of Motion     Left Wrist   Normal active range of motion    Right Wrist   Normal active range of motion    Left Thumb   Flexion     MP: 52 degrees    DIP: 70 degrees  Palmar Abduction     CMC: 50 degrees  Radial abduction    CMC: 45 degrees  Kapandji score: 10 degrees      Right Thumb   Flexion     MP: 50    DIP: 58  Palmar Abduction    CMC: 40  Radial Abduction    CMC: 35  Kapandji score: 9 degrees    Passive Range of Motion     Additional Passive Range of Motion Details  B/L Full composite fist    Strength/Myotome Testing     Left Wrist/Hand      (2nd hand position)     Trial 1: 51    Thumb Strength  Key/Lateral Pinch     Trial 1: 16  Tip/Two-Point Pinch     Trial 1: 12  Palmar/Three-Point Pinch     Trial 1: 18    Right Wrist/Hand      (2nd hand position)     Trial 1: 44.5    Thumb Strength   Key/Lateral Pinch     Trial 1: 18  Tip/Two-Point Pinch     Trial 1: 13  Palmar/Three-Point Pinch     Trial 1: 19    Swelling     Left Wrist/Hand   Circumference MCP: 18.5 cm  Circumference wrist: 16.6 cm    Right Wrist/Hand   Circumference MCP: 19.5 cm  Circumference wrist: 16.5 cm             Precautions: Trigger Finger Release R Thumb  DOS: 3/14/25      Manuals 3/31            IASTM             Ultrasound             Scar pad provided                         Neuro Re-Ed                                                                                                        Ther Ex             HEP Blocking    Thumb PROM    Thumb AROM            FPL stretch             Thumb PROM             Opposition marbles             Hatch sort             Blocking              Dex balls             Power web             Ext web                                       Ther Activity             Keypegs                                                                 Modalities             MHP

## 2025-04-11 ENCOUNTER — APPOINTMENT (OUTPATIENT)
Dept: OCCUPATIONAL THERAPY | Facility: CLINIC | Age: 60
End: 2025-04-11
Payer: COMMERCIAL

## 2025-04-16 ENCOUNTER — OFFICE VISIT (OUTPATIENT)
Dept: OBGYN CLINIC | Facility: CLINIC | Age: 60
End: 2025-04-16

## 2025-04-16 VITALS — BODY MASS INDEX: 33.68 KG/M2 | WEIGHT: 183 LBS | HEIGHT: 62 IN

## 2025-04-16 DIAGNOSIS — R22.31 MASS OF RIGHT HAND: ICD-10-CM

## 2025-04-16 DIAGNOSIS — M65.311 TRIGGER THUMB OF RIGHT HAND: Primary | ICD-10-CM

## 2025-04-16 DIAGNOSIS — Z98.890 S/P TRIGGER FINGER RELEASE: ICD-10-CM

## 2025-04-16 PROCEDURE — 99024 POSTOP FOLLOW-UP VISIT: CPT | Performed by: SURGERY

## 2025-04-16 NOTE — PROGRESS NOTES
Assessment/Plan:  Patient ID: Melissa Dickens 59 y.o. female   Surgery: RELEASE TRIGGER FINGER, right thumb - Right and EXPLORATION, right hand - Right  Date of Surgery: 3/14/2025      Assessment & Plan  Trigger thumb of right hand  5 weeks s/p right trigger thumb release and right and exploration, DOS 3/14/25  She devloped a hypertropic scar to her trigger finger release incision, no evidence of retained suture or foreign body clinically.   She will continue scar tissue massage multiple times a day.   She will continue the Silicone scar tape as the scar pad makes the thumb stiff.   A US of the right thumb A1 pulley/trigger finger release incision was ordered to further evaluate for retained suture/foreign body. US to be performed by Dr. Saunders.   Discussed the possibility of exploration, excision of scar tissue and revision wound closure, but no guarantee hypertrophic scar will not re-form.   I will see her back in the office after the US is complete to review US results.        Mass of right hand         S/P trigger finger release    Orders:    US MSK limited; Future      Follow Up:  After Testing    To Do Next Visit:  Re-evaluation       CHIEF COMPLAINT:  Chief Complaint   Patient presents with    Right Thumb - Triggering         SUBJECTIVE:  Melissa Dickens is a 59 y.o. year old female who presents for follow up after RELEASE TRIGGER FINGER, right thumb - Right and EXPLORATION, right hand - Right. Today patient has an area to the trigger finger release incision that she thinks may be a retained suture. She notes tenderness to this area as well as a thickened scar. She did have one therapy session thus far and has another appointment tomorrow. The scar putty at night did cause thumb stiffness in the AM, she has since been wearing a silicone scar sheet.        PHYSICAL EXAMINATION:  General: well developed and well nourished, alert, oriented times 3, and appears comfortable  Psychiatric:  Normal    MUSCULOSKELETAL EXAMINATION:  Incision: hypertrophic scar to the trigger finger release incision   Surgery Site: normal, no evidence of infection   Range of Motion: As expected and full composite fist possible  Neurovascular status: Neuro intact, good cap refill  Activity Restrictions: No restrictions        STUDIES REVIEWED:  No new imaging to review     LABS REVIEWED:    HgA1c:   Lab Results   Component Value Date    HGBA1C 5.4 08/27/2022     BMP:   Lab Results   Component Value Date    CALCIUM 9.0 03/01/2025    K 4.0 03/01/2025    CO2 23 03/01/2025     03/01/2025    BUN 14 03/01/2025    CREATININE 1.11 03/01/2025           PROCEDURES PERFORMED:  Procedures  No Procedures performed today    Scribe Attestation      I,:  Evelia Chopra MA am acting as a scribe while in the presence of the attending physician.:       I,:  Sheila Dumont MD personally performed the services described in this documentation    as scribed in my presence.:

## 2025-04-17 ENCOUNTER — OFFICE VISIT (OUTPATIENT)
Dept: OCCUPATIONAL THERAPY | Facility: CLINIC | Age: 60
End: 2025-04-17
Payer: COMMERCIAL

## 2025-04-17 DIAGNOSIS — R22.31 MASS OF RIGHT HAND: ICD-10-CM

## 2025-04-17 DIAGNOSIS — M65.311 TRIGGER THUMB OF RIGHT HAND: Primary | ICD-10-CM

## 2025-04-17 PROCEDURE — 97110 THERAPEUTIC EXERCISES: CPT

## 2025-04-17 PROCEDURE — 97530 THERAPEUTIC ACTIVITIES: CPT

## 2025-04-17 PROCEDURE — 97140 MANUAL THERAPY 1/> REGIONS: CPT

## 2025-04-17 NOTE — PROGRESS NOTES
"Daily Note     Today's date: 2025  Patient name: Melissa Dickens  : 1965  MRN: [id number]  Referring provider: Sheila Dumont MD  Dx:   Encounter Diagnosis     ICD-10-CM    1. Trigger thumb of right hand  M65.311       2. Mass of right hand  R22.31                      Subjective: \"If it wasn't for that small spot it would be great\".       Objective: See treatment diary below      Assessment: Tolerated treatment well. Patient has one small point of hypersensitivity and hypotrophic scar tissue. Completed graston and ultrasound to address this,        Plan: Continue per plan of care.  Progress treatment as tolerated.       Precautions: Trigger Finger Release R Thumb  DOS: 3/14/25      Manuals 3/31 4/17           IASTM  8           Ultrasound  6           Scar pad provided                         Neuro Re-Ed                                                                                                        Ther Ex             HEP Blocking    Thumb PROM    Thumb AROM            FPL stretch  4'           Thumb PROM  8'           Opposition marbles  all           [place] sort             Blocking  x20           Dex balls  2'           Power web  Y center and rim x20           Ext web  Y x 20                                     Ther Activity             Keypegs  x1           Pinch ring  R/R x20                                                  Modalities             MHP  5'                             "

## 2025-04-25 ENCOUNTER — APPOINTMENT (OUTPATIENT)
Dept: OCCUPATIONAL THERAPY | Facility: CLINIC | Age: 60
End: 2025-04-25
Payer: COMMERCIAL

## 2025-05-02 PROBLEM — I34.0 MILD MITRAL REGURGITATION: Status: RESOLVED | Noted: 2023-09-22 | Resolved: 2025-05-02

## 2025-05-02 PROBLEM — I35.0 AORTIC STENOSIS, MILD: Status: ACTIVE | Noted: 2025-05-02

## 2025-05-06 ENCOUNTER — OFFICE VISIT (OUTPATIENT)
Dept: FAMILY MEDICINE CLINIC | Facility: CLINIC | Age: 60
End: 2025-05-06
Payer: COMMERCIAL

## 2025-05-06 VITALS
WEIGHT: 189 LBS | OXYGEN SATURATION: 97 % | SYSTOLIC BLOOD PRESSURE: 148 MMHG | HEART RATE: 80 BPM | DIASTOLIC BLOOD PRESSURE: 78 MMHG | BODY MASS INDEX: 34.78 KG/M2 | RESPIRATION RATE: 16 BRPM | HEIGHT: 62 IN

## 2025-05-06 DIAGNOSIS — J45.21 MILD INTERMITTENT ASTHMATIC BRONCHITIS WITH ACUTE EXACERBATION: ICD-10-CM

## 2025-05-06 DIAGNOSIS — K51.80 OTHER ULCERATIVE COLITIS WITHOUT COMPLICATION (HCC): ICD-10-CM

## 2025-05-06 DIAGNOSIS — R10.9 ABDOMINAL PRESSURE: Primary | ICD-10-CM

## 2025-05-06 DIAGNOSIS — R31.29 MICROSCOPIC HEMATURIA: ICD-10-CM

## 2025-05-06 LAB
SL AMB  POCT GLUCOSE, UA: ABNORMAL
SL AMB LEUKOCYTE ESTERASE,UA: ABNORMAL
SL AMB POCT BILIRUBIN,UA: ABNORMAL
SL AMB POCT BLOOD,UA: 10
SL AMB POCT KETONES,UA: ABNORMAL
SL AMB POCT NITRITE,UA: ABNORMAL
SL AMB POCT PH,UA: 6
SL AMB POCT SPECIFIC GRAVITY,UA: 1.01
SL AMB POCT URINE PROTEIN: ABNORMAL
SL AMB POCT UROBILINOGEN: 0.2

## 2025-05-06 PROCEDURE — 87086 URINE CULTURE/COLONY COUNT: CPT | Performed by: INTERNAL MEDICINE

## 2025-05-06 PROCEDURE — 99214 OFFICE O/P EST MOD 30 MIN: CPT | Performed by: INTERNAL MEDICINE

## 2025-05-06 PROCEDURE — 81001 URINALYSIS AUTO W/SCOPE: CPT | Performed by: INTERNAL MEDICINE

## 2025-05-06 PROCEDURE — 81002 URINALYSIS NONAUTO W/O SCOPE: CPT | Performed by: INTERNAL MEDICINE

## 2025-05-06 RX ORDER — SULFAMETHOXAZOLE AND TRIMETHOPRIM 800; 160 MG/1; MG/1
1 TABLET ORAL EVERY 12 HOURS SCHEDULED
Qty: 10 TABLET | Refills: 0 | Status: SHIPPED | OUTPATIENT
Start: 2025-05-06 | End: 2025-05-11

## 2025-05-06 NOTE — PROGRESS NOTES
Assessment/Plan:Not exactly sure what is causing Melissa's pain, possibly UTI although urine dip doesn't really show that and she doesn't have burning, kidney stones are possible, a bladder issue or pelvic organ issue (she has her uterus and ovaries), divertciulitis. I sent out the urine that we obtained here for U/A and culture and I ordered a CT abd/pelvis to be done to look for kidney stones, anything structural etc-may end up needing a bladder US/cystoscopy and pelvic US too to better visualize the bladder and uterus and ovaries. Especially given that she is a smoker. Advised tylenol, motrin prn and staying hydrated-Melissa did ask me to prescribe an antibiotic for her something to just cover for a UTI so I did send some Bactrim for her          Problem List Items Addressed This Visit       Other ulcerative colitis without complication (HCC)    Asthmatic bronchitis     Other Visit Diagnoses         Abdominal pressure    -  Primary    Relevant Medications    sulfamethoxazole-trimethoprim (BACTRIM DS) 800-160 mg per tablet    Other Relevant Orders    POCT urine dip (Completed)    Urine culture    Urinalysis with microscopic    CT abdomen pelvis wo contrast      Microscopic hematuria        Relevant Orders    CT abdomen pelvis wo contrast              Subjective:      Patient ID: Melissa Dickens is a 59 y.o. female.    Melissa here because she's had lower abdominal pain and pressure for a few days-she thought maybe UTI but on urine dip here just shows some red blood cells, the nitrite and Leukocytes are negative-she denies fever, n/v, change in bowels,has a bit of back pain and denies vaginal bleeding.Says she has never had a kidney stone that she's aware of. She is a smoker.  Has remote history of ulcerative colitis but says it's not active and says she had a colonoscopy not too long ago    Abdominal Pain  Pertinent negatives include no constipation, diarrhea, dysuria, frequency, nausea or vomiting.       The following  portions of the patient's history were reviewed and updated as appropriate:   Past Medical History:  She has a past medical history of Asthmatic bronchitis (2024), Colon polyp, Depression (), GERD (gastroesophageal reflux disease), Heart murmur (), HL (hearing loss), Hyperlipidemia, Hypertension, Inflammatory bowel disease, Joint pain, Tinnitus, Ulcerative colitis (HCC), and Vertigo.,  _______________________________________________________________________  Medical Problems:  does not have any pertinent problems on file.,  _______________________________________________________________________  Past Surgical History:   has a past surgical history that includes  section (N/A, 189,1993); FL injection left hip (non arthrogram) (12/10/2018); Colonoscopy (2017); Mammo (historical) (2020); FL injection left hip (non arthrogram) (2020); Colonoscopy; [place] tooth extraction; FL injection left hip (non arthrogram) (2022); FL injection left hip (non arthrogram) (2022); pr tendon sheath incision (Left, 2024); pr laparoscopy surg cholecystectomy (N/A, 2024); pr tendon sheath incision (Right, 3/14/2025); and pr exc lesion tdn shth/jt capsl hand/fngr (Right, 3/14/2025).,  _______________________________________________________________________  Family History:  family history includes Breast cancer in her sister; Cancer in her brother; Colon cancer in her father; Diabetes in her mother; Heart disease in her father and mother; No Known Problems in her daughter, maternal aunt, maternal aunt, maternal aunt, maternal grandfather, maternal grandmother, paternal aunt, paternal aunt, and paternal grandfather; Thyroid disease in her brother; Uterine cancer in her paternal grandmother.,  _______________________________________________________________________  Social History:   reports that she has been smoking cigarettes. She started smoking about 44 years ago. She has a 33.4 pack-year  "smoking history. She has never used smokeless tobacco. She reports that she does not currently use alcohol. She reports that she does not use drugs.,  _______________________________________________________________________  Allergies:  has no known allergies..  _______________________________________________________________________  Current Outpatient Medications   Medication Sig Dispense Refill    buPROPion (WELLBUTRIN XL) 150 mg 24 hr tablet TAKE 1 TABLET EVERY MORNING 90 tablet 1    Cholecalciferol (Vitamin D3) 50 MCG (2000 UT) CHEW Chew 4,000 Units in the morning      escitalopram (LEXAPRO) 5 mg tablet TAKE 1 TABLET DAILY 90 tablet 1    ezetimibe (ZETIA) 10 mg tablet TAKE 1 TABLET DAILY 90 tablet 3    fluticasone (FLONASE) 50 mcg/act nasal spray 2 sprays into each nostril if needed      nystatin (MYCOSTATIN) cream Apply topically 2 (two) times a day 60 g 1    pravastatin (PRAVACHOL) 20 mg tablet Take 1 tablet (20 mg total) by mouth daily at bedtime 100 tablet 3    sulfamethoxazole-trimethoprim (BACTRIM DS) 800-160 mg per tablet Take 1 tablet by mouth every 12 (twelve) hours for 5 days 10 tablet 0     No current facility-administered medications for this visit.     _______________________________________________________________________  Review of Systems   Constitutional: Negative.    HENT: Negative.     Respiratory: Negative.     Gastrointestinal:  Positive for abdominal pain. Negative for blood in stool, constipation, diarrhea, nausea and vomiting.   Genitourinary:  Positive for pelvic pain. Negative for dysuria, flank pain and frequency.   Hematological: Negative.    Psychiatric/Behavioral: Negative.           Objective:  Vitals:    05/06/25 1542   BP: 148/78   BP Location: Left arm   Patient Position: Sitting   Cuff Size: Standard   Pulse: 80   Resp: 16   SpO2: 97%   Weight: 85.7 kg (189 lb)   Height: 5' 2\" (1.575 m)     Body mass index is 34.57 kg/m².     Physical Exam  Constitutional:       Appearance: She " is well-developed.   HENT:      Head: Normocephalic and atraumatic.      Mouth/Throat:      Mouth: Mucous membranes are moist.   Cardiovascular:      Rate and Rhythm: Normal rate and regular rhythm.   Pulmonary:      Effort: Pulmonary effort is normal.      Breath sounds: Normal breath sounds.   Abdominal:      General: Abdomen is flat.      Palpations: Abdomen is soft.      Tenderness: There is abdominal tenderness in the right lower quadrant, periumbilical area, suprapubic area and left lower quadrant.      Hernia: No hernia is present.      Comments: Fullness lower abdomen   Skin:     General: Skin is warm.   Neurological:      General: No focal deficit present.      Mental Status: She is alert.

## 2025-05-07 ENCOUNTER — RESULTS FOLLOW-UP (OUTPATIENT)
Dept: FAMILY MEDICINE CLINIC | Facility: CLINIC | Age: 60
End: 2025-05-07

## 2025-05-07 ENCOUNTER — HOSPITAL ENCOUNTER (OUTPATIENT)
Dept: CT IMAGING | Facility: HOSPITAL | Age: 60
Discharge: HOME | End: 2025-05-07
Attending: INTERNAL MEDICINE
Payer: COMMERCIAL

## 2025-05-07 DIAGNOSIS — R31.29 MICROSCOPIC HEMATURIA: ICD-10-CM

## 2025-05-07 DIAGNOSIS — R10.9 ABDOMINAL PRESSURE: ICD-10-CM

## 2025-05-07 LAB
BACTERIA UR QL AUTO: NORMAL /HPF
BILIRUB UR QL STRIP: NEGATIVE
CLARITY UR: CLEAR
COLOR UR: NORMAL
GLUCOSE UR STRIP-MCNC: NEGATIVE MG/DL
HGB UR QL STRIP.AUTO: NEGATIVE
KETONES UR STRIP-MCNC: NEGATIVE MG/DL
LEUKOCYTE ESTERASE UR QL STRIP: NEGATIVE
NITRITE UR QL STRIP: NEGATIVE
NON-SQ EPI CELLS URNS QL MICRO: NORMAL /HPF
PH UR STRIP.AUTO: 6 [PH]
PROT UR STRIP-MCNC: NEGATIVE MG/DL
RBC #/AREA URNS AUTO: NORMAL /HPF
SP GR UR STRIP.AUTO: 1.02 (ref 1–1.03)
UROBILINOGEN UR STRIP-ACNC: <2 MG/DL
WBC #/AREA URNS AUTO: NORMAL /HPF

## 2025-05-07 PROCEDURE — 74176 CT ABD & PELVIS W/O CONTRAST: CPT

## 2025-05-08 LAB — BACTERIA UR CULT: NORMAL

## 2025-06-20 ENCOUNTER — TELEPHONE (OUTPATIENT)
Age: 60
End: 2025-06-20

## 2025-06-20 DIAGNOSIS — J45.20 MILD INTERMITTENT ASTHMA WITHOUT COMPLICATION: Primary | ICD-10-CM

## 2025-06-20 RX ORDER — ALBUTEROL SULFATE 90 UG/1
2 INHALANT RESPIRATORY (INHALATION) EVERY 6 HOURS PRN
Qty: 18 G | Refills: 0 | Status: SHIPPED | OUTPATIENT
Start: 2025-06-20

## 2025-06-20 NOTE — TELEPHONE ENCOUNTER
Ok for refill   [email or web address]

## 2025-06-20 NOTE — TELEPHONE ENCOUNTER
Pt called to say she is on vacation in [place] and feels like she is wheezing a bit with the heat and humidity. Her breathing is tight but no shortness of breath. She would like a refill on the   albuterol (PROVENTIL HFA,VENTOLIN HFA) 90 mcg/act inhaler sent to [facility] in [place]. Please advise as soon as possible.

## 2025-06-23 ENCOUNTER — OFFICE VISIT (OUTPATIENT)
Dept: FAMILY MEDICINE CLINIC | Facility: CLINIC | Age: 60
End: 2025-06-23
Payer: COMMERCIAL

## 2025-06-23 ENCOUNTER — NURSE TRIAGE (OUTPATIENT)
Age: 60
End: 2025-06-23

## 2025-06-23 VITALS
OXYGEN SATURATION: 96 % | WEIGHT: 190 LBS | TEMPERATURE: 97.7 F | RESPIRATION RATE: 16 BRPM | HEART RATE: 83 BPM | BODY MASS INDEX: 34.96 KG/M2 | SYSTOLIC BLOOD PRESSURE: 130 MMHG | HEIGHT: 62 IN | DIASTOLIC BLOOD PRESSURE: 92 MMHG

## 2025-06-23 DIAGNOSIS — I10 ESSENTIAL HYPERTENSION: ICD-10-CM

## 2025-06-23 DIAGNOSIS — I35.0 AORTIC STENOSIS, MILD: ICD-10-CM

## 2025-06-23 DIAGNOSIS — R06.2 WHEEZING: ICD-10-CM

## 2025-06-23 DIAGNOSIS — R06.02 SHORTNESS OF BREATH: Primary | ICD-10-CM

## 2025-06-23 DIAGNOSIS — F17.210 CIGARETTE SMOKER: ICD-10-CM

## 2025-06-23 PROCEDURE — 99214 OFFICE O/P EST MOD 30 MIN: CPT | Performed by: INTERNAL MEDICINE

## 2025-06-23 RX ORDER — VALSARTAN 40 MG/1
20 TABLET ORAL DAILY
COMMUNITY

## 2025-06-23 RX ORDER — PREDNISONE 20 MG/1
TABLET ORAL
Qty: 15 TABLET | Refills: 0 | Status: SHIPPED | OUTPATIENT
Start: 2025-06-23

## 2025-06-23 NOTE — PROGRESS NOTES
"Assessment/Plan:Not exactly sure what is causing Melissa's sob and wheezing-she's been using an albuterol inhaler which helps some but I don't hear a lot of wheezing on exam today-she was mainly interested in getting a steroid taper which she says helped the bronchitis she had several months ago-I did send it for her, especially if this is viral and an underlying trigger of COPD-she wanted to hold off on an antibiotic, which is also reasonable at this point in time. We did discuss the possibility of PE, although her pulse ox was good and she reported no leg swelling, and really wasn't keen on going for a CTA to rule out PE. Also discussed ordering a CXR but she prefers to hold off on that too- she does have the mild aortic stenosis to factor in too although the last echo was from 6 months ago and I can't imagine it's changed that quickly but we discussed her possibly seeing a Cardiologist. She may want to see pulmonologist and get PFTs at some point too.  Told her to see how things go over the next 5-7 days and if no better we have to delve into things further with imaging etc.  While here she did also mention that her BP had been trending up and that she recently restarted her valsartan (taking 20 mg daily) and is monitoring at home         Problem List Items Addressed This Visit       Essential hypertension    Relevant Medications    valsartan (DIOVAN) 40 mg tablet    Cigarette smoker    Aortic stenosis, mild     Other Visit Diagnoses         Wheezing    -  Primary    Relevant Medications    predniSONE 20 mg tablet      Shortness of breath                  Subjective:      Patient ID: Melissa Dickens is a 59 y.o. female.    Melissa here with several days of feeling \"winded\", sob, a bit wheezy-says she went on vacation and was fine when she was at her destination but then started with this feeling sob sensation. Drove 7 hours home, although they did stop to take breaks. Covid went through their house recently but she " tested herself this AM and it was negative-she reports no fever, no cough, mild sore throat, was sob upon climbing the steps to get her. She has a hx of smoking,as well as aortic stenosis with a valve area of 1.45. Has not seen a Cardiologist and does not have chest pain-her oxygen level was 96% on RA today    Shortness of Breath  Pertinent negatives include no coughing, fatigue or leg swelling.       The following portions of the patient's history were reviewed and updated as appropriate:   Past Medical History:  She has a past medical history of Asthmatic bronchitis (2024), Colon polyp, Depression (), Fatty liver, GERD (gastroesophageal reflux disease), Heart murmur (), HL (hearing loss), Hyperlipidemia, Hypertension, Inflammatory bowel disease, Joint pain, Tinnitus, Ulcerative colitis (HCC), and Vertigo.,  _______________________________________________________________________  Medical Problems:  does not have any pertinent problems on file.,  _______________________________________________________________________  Past Surgical History:   has a past surgical history that includes  section (N/A, 189,); FL injection left hip (non arthrogram) (12/10/2018); Colonoscopy (2017); Mammo (historical) (2020); FL injection left hip (non arthrogram) (2020); Colonoscopy; [place] tooth extraction; FL injection left hip (non arthrogram) (2022); FL injection left hip (non arthrogram) (2022); pr tendon sheath incision (Left, 2024); pr laparoscopy surg cholecystectomy (N/A, 2024); pr tendon sheath incision (Right, 2025); pr exc lesion tdn shth/jt capsl hand/fngr (Right, 2025); and Tubal ligation ().,  _______________________________________________________________________  Family History:  family history includes Breast cancer in her sister; Cancer in her brother; Colon cancer in her father; Diabetes in her mother; Heart disease in her father and mother;  "Hypertension in her father and mother; Hypothyroidism in her brother; No Known Problems in her daughter, maternal aunt, maternal aunt, maternal aunt, maternal grandfather, maternal grandmother, paternal aunt, paternal aunt, and paternal grandfather; Thyroid disease in her brother; Uterine cancer in her paternal grandmother.,  _______________________________________________________________________  Social History:   reports that she has been smoking cigarettes. She started smoking about 44 years ago. She has a 33.5 pack-year smoking history. She has never used smokeless tobacco. She reports that she does not currently use alcohol. She reports that she does not use drugs.,  _______________________________________________________________________  Allergies:  has no known allergies..  _______________________________________________________________________  Current Medications[1]  _______________________________________________________________________  Review of Systems   Constitutional:  Negative for fatigue and fever.   HENT: Negative.     Respiratory:  Positive for shortness of breath. Negative for cough.    Cardiovascular: Negative.  Negative for leg swelling.   Gastrointestinal: Negative.    Genitourinary: Negative.    Musculoskeletal: Negative.    Neurological: Negative.    Hematological: Negative.          Objective:  Vitals:    06/23/25 1353   BP: 130/92   BP Location: Left arm   Patient Position: Sitting   Cuff Size: Standard   Pulse: 83   Resp: 16   Temp: 97.7 °F (36.5 °C)   TempSrc: Tympanic   SpO2: 96%   Weight: 86.2 kg (190 lb)   Height: 5' 2\" (1.575 m)     Body mass index is 34.75 kg/m².     Physical Exam  Constitutional:       Appearance: She is well-developed. She is obese.   HENT:      Head: Normocephalic and atraumatic.      Mouth/Throat:      Mouth: Mucous membranes are moist.     Eyes:      Extraocular Movements: Extraocular movements intact.      Pupils: Pupils are equal, round, and reactive to " light.     Pulmonary:      Effort: Pulmonary effort is normal.      Breath sounds: Examination of the left-lower field reveals wheezing. Wheezing present.   Chest:      Chest wall: No mass or tenderness.     Musculoskeletal:         General: Normal range of motion.      Cervical back: Neck supple.     Skin:     General: Skin is warm.     Neurological:      General: No focal deficit present.      Mental Status: She is alert and oriented to person, place, and time.     Psychiatric:         Behavior: Behavior normal.              [1]   Current Outpatient Medications   Medication Sig Dispense Refill    albuterol (Ventolin HFA) 90 mcg/act inhaler Inhale 2 puffs every 6 (six) hours as needed for wheezing 18 g 0    buPROPion (WELLBUTRIN XL) 150 mg 24 hr tablet TAKE 1 TABLET EVERY MORNING 90 tablet 1    Cholecalciferol (Vitamin D3) 50 MCG (2000 UT) CHEW Chew 4,000 Units in the morning      escitalopram (LEXAPRO) 5 mg tablet TAKE 1 TABLET DAILY 90 tablet 1    ezetimibe (ZETIA) 10 mg tablet TAKE 1 TABLET DAILY 90 tablet 3    fluticasone (FLONASE) 50 mcg/act nasal spray 2 sprays into each nostril if needed      nystatin (MYCOSTATIN) cream Apply topically 2 (two) times a day 60 g 1    pravastatin (PRAVACHOL) 20 mg tablet Take 1 tablet (20 mg total) by mouth daily at bedtime 100 tablet 3    predniSONE 20 mg tablet Take 2 tablets daily X 3 days, 1 tablet daily X 4 days, 1/2 tablet daily X 5 days 15 tablet 0    valsartan (DIOVAN) 40 mg tablet Take 20 mg by mouth daily       No current facility-administered medications for this visit.

## 2025-06-23 NOTE — TELEPHONE ENCOUNTER
"REASON FOR CONVERSATION: Shortness of Breath    SYMPTOMS: mild shortness of breath, URI, chest tightness. Covid exposure     OTHER HEALTH INFORMATION: using inhaler, exposed to covid, will test this afternoon, waiting for daughter to drop off test kit, declines anti- viral treatment , requesting oral steroids     PROTOCOL DISPOSITION: Go to [facility]    CARE ADVICE PROVIDED: call transferred to clerical to further assist     PRACTICE FOLLOW-UP: warm transfer to assist with scheduling       Reason for Disposition   MILD difficulty breathing (e.g., minimal/no SOB at rest, SOB with walking, pulse < 100) of new-onset or worse than normal    Answer Assessment - Initial Assessment Questions  1. RESPIRATORY STATUS: \"Describe your breathing?\" (e.g., wheezing, shortness of breath, unable to speak, severe coughing)       Difficult to take a deep breath   2. ONSET: \"When did this breathing problem begin?\"       Thursday 6/19.   3. PATTERN \"Does the difficult breathing come and go, or has it been constant since it started?\"       Constant   4. SEVERITY: \"How bad is your breathing?\" (e.g., mild, moderate, severe)       Moderate   5. RECURRENT SYMPTOM: \"Have you had difficulty breathing before?\" If Yes, ask: \"When was the last time?\" and \"What happened that time?\"       Yes, bronchitis in the past   6. CARDIAC HISTORY: \"Do you have any history of heart disease?\" (e.g., heart attack, angina, bypass surgery, angioplasty)       HTN  7. LUNG HISTORY: \"Do you have any history of lung disease?\"  (e.g., pulmonary embolus, asthma, emphysema)      Denies , smoker   8. CAUSE: \"What do you think is causing the breathing problem?\"       Bronchitis or covid   9. OTHER SYMPTOMS: \"Do you have any other symptoms?\" (e.g., chest pain, cough, dizziness, fever, runny nose)      Vertigo , nasal congestion , chest tightness   10. O2 SATURATION MONITOR:  \"Do you use an oxygen saturation monitor (pulse oximeter) at home?\" If Yes, ask: \"What " "is your reading (oxygen level) today?\" \"What is your usual oxygen saturation reading?\" (e.g., 95%)        Doesn't have a pulse ox at home     12. TRAVEL: \"Have you traveled out of the country in the last month?\" (e.g., travel history, exposures)        [place] on vacation with 33 people in a home    Protocols used: Breathing Difficulty-Adult-[place]    "

## 2025-06-23 NOTE — TELEPHONE ENCOUNTER
Regarding: Chest tightness / SOB  ----- Message from Vicki LOTT sent at 6/23/2025 10:51 AM EDT -----  Melissa called for an appointment. She is experiencing some chest tightness and feels SOB. She also has a sinus headache. She has been using an inhaler for her breasthing but she state she is feeling like its not normal  Attempt made to reach triage line.   Please advise and return call to triage symptoms.   Thank you!

## 2025-07-15 PROBLEM — N20.0 STONE IN KIDNEY: Status: ACTIVE | Noted: 2025-07-15

## 2025-07-15 PROBLEM — Z78.0 POSTMENOPAUSAL: Status: ACTIVE | Noted: 2025-07-15

## 2025-07-15 NOTE — PROGRESS NOTES
Name: Melissa Dickens      : 1965      MRN: [id number]  Encounter Provider: Dori Velasquez MD  Encounter Date: 2025   Encounter department: [facility]  :  Assessment & Plan  Cigarette smoker  Encouraged to stop CT lung due   Orders:    CT Lung Screening Program; Future    Dyslipidemia  LDL at goal         Class 1 obesity due to excess calories with serious comorbidity and body mass index (BMI) of 33.0 to 33.9 in adult  Discussion on diet and exercise guidelines for weight loss and  health reviewed with pt            Nonrheumatic aortic valve stenosis  Mild on echo  no change        Moderate major depression, single episode (HCC)  Doing better on Lexapro 5 mg  daily         Multiple thyroid nodules  No need for follow up ultrasound per         Stone in kidney  Left upper pole non obstructing asymptomatic         Encounter for immunization  Zoster   Orders:    Zoster Vaccine Recombinant IM    Postmenopausal  Check dexa    Orders:    DXA bone density spine hip and pelvis; Future    Mild mitral regurgitation  Echo  stable          Abnormal glucose  Check  hga1c     Orders:    POCT hemoglobin A1c    Mixed simple and mucopurulent chronic bronchitis (HCC)  Albuterol prn          Encounter for screening mammogram for breast cancer    Orders:    Mammo screening bilateral w 3d and cad; Future    Lateral epicondylitis of left elbow  Tennis elbow brace ice to area and ibuptofen or alleve     Orders:    Ambulatory Referral to Orthopedic Surgery; Future    Prediabetes  Discussion on diet and exercise guidelines for weight loss and  health reviewed with pt                   History of Present Illness   Pt is here for interval visit and evaluation of multiple medical problems, review of medications, labs, Health Maintenance and any recent specialty consults        Review of Systems   Constitutional:  Negative for appetite change, chills, fatigue and fever.   Respiratory:   "Negative for cough, chest tightness and shortness of breath.    Cardiovascular:  Negative for chest pain, palpitations and leg swelling.   Gastrointestinal:  Negative for abdominal pain, constipation, diarrhea, nausea and vomiting.   Genitourinary:  Negative for difficulty urinating and frequency.   Musculoskeletal:  Positive for arthralgias (left elbow pain for 2 months). Negative for back pain, gait problem and neck pain.   Skin:  Negative for rash.   Neurological:  Negative for dizziness, weakness, light-headedness, numbness and headaches.   Hematological:  Does not bruise/bleed easily.   Psychiatric/Behavioral:  Negative for dysphoric mood and sleep disturbance. The patient is not nervous/anxious.        Objective   /62   Pulse 80   Temp 97.6 °F (36.4 °C) (Tympanic)   Resp 16   Ht 5' 2\" (1.575 m)   Wt 84.8 kg (187 lb)   SpO2 97%   BMI 34.20 kg/m²      Physical Exam  Constitutional:       General: She is not in acute distress.     Appearance: Normal appearance. She is obese.   Neck:      Thyroid: No thyromegaly.      Vascular: No carotid bruit.     Cardiovascular:      Rate and Rhythm: Normal rate and regular rhythm.      Heart sounds: Normal heart sounds. No murmur heard.  Pulmonary:      Effort: Pulmonary effort is normal. No respiratory distress.      Breath sounds: Normal breath sounds. No wheezing, rhonchi or rales.   Chest:   Breasts:     Right: Normal.      Left: Normal.   Abdominal:      Palpations: Abdomen is soft.      Tenderness: There is no abdominal tenderness.     Musculoskeletal:         General: Tenderness (left lateral epicondyle) present.      Cervical back: Normal range of motion and neck supple.      Right lower leg: No edema.      Left lower leg: No edema.     Skin:     Findings: No rash.     Neurological:      General: No focal deficit present.      Mental Status: She is alert and oriented to person, place, and time. Mental status is at baseline.      Gait: Gait normal. "     Psychiatric:         Mood and Affect: Mood normal.         Behavior: Behavior normal.

## 2025-07-18 ENCOUNTER — OFFICE VISIT (OUTPATIENT)
Dept: FAMILY MEDICINE CLINIC | Facility: CLINIC | Age: 60
End: 2025-07-18
Payer: COMMERCIAL

## 2025-07-18 ENCOUNTER — TELEPHONE (OUTPATIENT)
Dept: FAMILY MEDICINE CLINIC | Facility: CLINIC | Age: 60
End: 2025-07-18

## 2025-07-18 VITALS
OXYGEN SATURATION: 97 % | RESPIRATION RATE: 16 BRPM | BODY MASS INDEX: 34.41 KG/M2 | SYSTOLIC BLOOD PRESSURE: 128 MMHG | DIASTOLIC BLOOD PRESSURE: 62 MMHG | HEIGHT: 62 IN | WEIGHT: 187 LBS | HEART RATE: 80 BPM | TEMPERATURE: 97.6 F

## 2025-07-18 DIAGNOSIS — E78.5 DYSLIPIDEMIA: ICD-10-CM

## 2025-07-18 DIAGNOSIS — R73.03 PREDIABETES: ICD-10-CM

## 2025-07-18 DIAGNOSIS — I34.0 MILD MITRAL REGURGITATION: ICD-10-CM

## 2025-07-18 DIAGNOSIS — F17.210 CIGARETTE SMOKER: Primary | ICD-10-CM

## 2025-07-18 DIAGNOSIS — M77.12 LATERAL EPICONDYLITIS OF LEFT ELBOW: ICD-10-CM

## 2025-07-18 DIAGNOSIS — E66.811 CLASS 1 OBESITY DUE TO EXCESS CALORIES WITH SERIOUS COMORBIDITY AND BODY MASS INDEX (BMI) OF 33.0 TO 33.9 IN ADULT: ICD-10-CM

## 2025-07-18 DIAGNOSIS — Z12.31 ENCOUNTER FOR SCREENING MAMMOGRAM FOR BREAST CANCER: ICD-10-CM

## 2025-07-18 DIAGNOSIS — E04.2 MULTIPLE THYROID NODULES: ICD-10-CM

## 2025-07-18 DIAGNOSIS — F32.1 MODERATE MAJOR DEPRESSION, SINGLE EPISODE (HCC): ICD-10-CM

## 2025-07-18 DIAGNOSIS — T75.3XXA MOTION SICKNESS, INITIAL ENCOUNTER: Primary | ICD-10-CM

## 2025-07-18 DIAGNOSIS — J41.8 MIXED SIMPLE AND MUCOPURULENT CHRONIC BRONCHITIS (HCC): ICD-10-CM

## 2025-07-18 DIAGNOSIS — R73.09 ABNORMAL GLUCOSE: ICD-10-CM

## 2025-07-18 DIAGNOSIS — Z23 ENCOUNTER FOR IMMUNIZATION: ICD-10-CM

## 2025-07-18 DIAGNOSIS — Z78.0 POSTMENOPAUSAL: ICD-10-CM

## 2025-07-18 DIAGNOSIS — E66.09 CLASS 1 OBESITY DUE TO EXCESS CALORIES WITH SERIOUS COMORBIDITY AND BODY MASS INDEX (BMI) OF 33.0 TO 33.9 IN ADULT: ICD-10-CM

## 2025-07-18 DIAGNOSIS — I35.0 NONRHEUMATIC AORTIC VALVE STENOSIS: ICD-10-CM

## 2025-07-18 DIAGNOSIS — N20.0 STONE IN KIDNEY: ICD-10-CM

## 2025-07-18 PROBLEM — J44.9 COPD (CHRONIC OBSTRUCTIVE PULMONARY DISEASE) (HCC): Status: ACTIVE | Noted: 2025-07-18

## 2025-07-18 PROBLEM — I10 ESSENTIAL HYPERTENSION: Status: RESOLVED | Noted: 2021-09-03 | Resolved: 2025-07-18

## 2025-07-18 LAB — SL AMB POCT HEMOGLOBIN AIC: 5.8 (ref ?–6.5)

## 2025-07-18 PROCEDURE — 90471 IMMUNIZATION ADMIN: CPT | Performed by: FAMILY MEDICINE

## 2025-07-18 PROCEDURE — 99214 OFFICE O/P EST MOD 30 MIN: CPT | Performed by: FAMILY MEDICINE

## 2025-07-18 PROCEDURE — 83036 HEMOGLOBIN GLYCOSYLATED A1C: CPT | Performed by: FAMILY MEDICINE

## 2025-07-18 PROCEDURE — 90750 HZV VACC RECOMBINANT IM: CPT | Performed by: FAMILY MEDICINE

## 2025-07-18 RX ORDER — SCOPOLAMINE 1 MG/3D
1 PATCH, EXTENDED RELEASE TRANSDERMAL
Qty: 4 PATCH | Refills: 0 | Status: SHIPPED | OUTPATIENT
Start: 2025-07-18

## 2025-07-18 NOTE — TELEPHONE ENCOUNTER
Pt is going on a cruise mid-October and would like a Rx for the motion sickness patches that last a couple of days if a prescription is needed.  If available OTC, please let her know.    If Rx, possible, please send to [facility].    Thank you!

## 2025-07-18 NOTE — ASSESSMENT & PLAN NOTE
Tennis elbow brace ice to area and ibuptofen or alleve     Orders:    Ambulatory Referral to Orthopedic Surgery; Future

## 2025-07-22 ENCOUNTER — OFFICE VISIT (OUTPATIENT)
Dept: OBGYN CLINIC | Facility: CLINIC | Age: 60
End: 2025-07-22
Payer: COMMERCIAL

## 2025-07-22 ENCOUNTER — HOSPITAL ENCOUNTER (OUTPATIENT)
Dept: RADIOLOGY | Facility: HOSPITAL | Age: 60
Discharge: HOME | End: 2025-07-22
Attending: STUDENT IN AN ORGANIZED HEALTH CARE EDUCATION/TRAINING PROGRAM
Payer: COMMERCIAL

## 2025-07-22 VITALS — WEIGHT: 185.2 LBS | BODY MASS INDEX: 34.08 KG/M2 | HEIGHT: 62 IN

## 2025-07-22 DIAGNOSIS — M77.12 LATERAL EPICONDYLITIS OF LEFT ELBOW: ICD-10-CM

## 2025-07-22 DIAGNOSIS — M77.12 LATERAL EPICONDYLITIS OF LEFT ELBOW: Primary | ICD-10-CM

## 2025-07-22 PROCEDURE — 73080 X-RAY EXAM OF ELBOW: CPT

## 2025-07-22 PROCEDURE — 99244 OFF/OP CNSLTJ NEW/EST MOD 40: CPT | Performed by: STUDENT IN AN ORGANIZED HEALTH CARE EDUCATION/TRAINING PROGRAM

## 2025-07-22 RX ORDER — MELOXICAM 15 MG/1
15 TABLET ORAL DAILY
Qty: 30 TABLET | Refills: 2 | Status: SHIPPED | OUTPATIENT
Start: 2025-07-22 | End: 2025-10-20

## 2025-07-22 NOTE — ASSESSMENT & PLAN NOTE
We reviewed their current history, physical exam, and imaging in detail today with the patient.  This is consistent with left elbow lateral epicondylitis.  We discussed starting with conservative management including physical therapy, anti-inflammatory medication meloxicam, and bracing.  The patient already has a lateral epicondylitis strap, she was provided with a cock up wrist brace in clinic today.  Order placed. Discussed attending formal physical therapy for strengthening, range of motion, and at home exercise program.  PT prescription provided to the patient today. We discussed Meloxicam for persistent pain and inflammation. Discussed side effects and risks of medication. Patient agreeable. Prescription and instruction for oral use provided to the patient today. All of their questions were answered in detail today.  The patient is agreeable to this plan.  They will follow-up in clinic in 6 weeks for reevaluation without x-ray.  We will order an MRI if no significant improvement at that time.      Orders:    Ambulatory Referral to Orthopedic Surgery    XR elbow 3+ vw left; Future    Cock Up Wrist Splint    Ambulatory Referral to PT/OT Hand Therapy; Future    meloxicam (MOBIC) 15 mg tablet; Take 1 tablet (15 mg total) by mouth daily

## 2025-07-22 NOTE — PROGRESS NOTES
Ortho Sports Medicine New Patient Elbow Visit     Assesment:   59 y.o.female left elbow lateral epicondylitis    Assessment & Plan  Lateral epicondylitis of left elbow  We reviewed their current history, physical exam, and imaging in detail today with the patient.  This is consistent with left elbow lateral epicondylitis.  We discussed starting with conservative management including physical therapy, anti-inflammatory medication meloxicam, and bracing.  The patient already has a lateral epicondylitis strap, she was provided with a cock up wrist brace in clinic today.  Order placed. Discussed attending formal physical therapy for strengthening, range of motion, and at home exercise program.  PT prescription provided to the patient today. We discussed Meloxicam for persistent pain and inflammation. Discussed side effects and risks of medication. Patient agreeable. Prescription and instruction for oral use provided to the patient today. All of their questions were answered in detail today.  The patient is agreeable to this plan.  They will follow-up in clinic in 6 weeks for reevaluation without x-ray.  We will order an MRI if no significant improvement at that time.      Orders:    Ambulatory Referral to Orthopedic Surgery    XR elbow 3+ vw left; Future    Cock Up Wrist Splint    Ambulatory Referral to PT/OT Hand Therapy; Future    meloxicam (MOBIC) 15 mg tablet; Take 1 tablet (15 mg total) by mouth daily         Conservative treatment:    Ice to elbow 1-2 times daily, for 20 minutes at a time.  PT for ROM and strengthening to elbow, wrist and forearm    Imaging:    All imaging from today was reviewed by myself and explained to the patient.     Injection:    No Injection planned at this time.    Surgery:     No surgery is recommended at this point, continue with conservative treatment plan as noted.    Follow up:    Follow-up in 6 weeks, no x-rays        Chief Complaint   Patient presents with    Left Elbow - Pain        History of Present Illness:    The patient is a 59 y.o., RHD who presents for evaluation of acute left elbow pain.  She is referred by Dr. Cabrera for consultation of her left elbow.  She reports several months of pain in the lateral aspect of her elbow.  She reports a history of tendinitis in one of her elbows, she does not remember which 1 that was treated with injections and has done well since then.  This was several years ago.  She reports pain with gripping and decreased strength.  She reports pain with prolonged extension or flexion of her elbow.  She does have a lateral epicondylitis strap that helps when she is sleeping. She was previously on prednisone for another condition and reports relief of her elbow pain while on this.  She works as an .    Hand/wrist Surgical History:  None    Past Medical, Social and Family History:  Past Medical History[1]  Past Surgical History[2]  Allergies[3]  Medications Ordered Prior to Encounter[4]  Social History     Socioeconomic History    Marital status:      Spouse name: Not on file    Number of children: Not on file    Years of education: Not on file    Highest education level: Not on file   Occupational History    Not on file   Tobacco Use    Smoking status: Every Day     Current packs/day: 0.50     Average packs/day: 0.7 packs/day for 44.9 years (33.5 ttl pk-yrs)     Types: Cigarettes     Start date: 9/3/1980    Smokeless tobacco: Never    Tobacco comments:     started at age 16 per [place] pt has not smoked the steady timefram of 30 yrs quit  for 3 yeaes at least 2 times   Vaping Use    Vaping status: Never Used   Substance and Sexual Activity    Alcohol use: Not Currently    Drug use: Never    Sexual activity: Yes     Partners: Male     Birth control/protection: Post-menopausal, Female Sterilization   Other Topics Concern    Not on file   Social History Narrative    Not on file     Social Drivers of Health     Financial Resource Strain:  "Not on file   Food Insecurity: Not on file   Transportation Needs: Not on file   Physical Activity: Not on file   Stress: Not on file   Social Connections: Not on file   Intimate Partner Violence: Not on file   Housing Stability: Not on file         I have reviewed the past medical, surgical, social and family history, medications and allergies as documented in the EMR.    Review of systems: ROS is negative other than that noted in the HPI.  Constitutional: Negative for fatigue and fever.   HENT: Negative for sore throat.    Respiratory: Negative for shortness of breath.    Cardiovascular: Negative for chest pain.   Gastrointestinal: Negative for abdominal pain.   Endocrine: Negative for cold intolerance and heat intolerance.   Genitourinary: Negative for flank pain.   Musculoskeletal: Negative for back pain.   Skin: Negative for rash.   Allergic/Immunologic: Negative for immunocompromised state.   Neurological: Negative for dizziness.   Psychiatric/Behavioral: Negative for agitation.     Physical Exam:    Height 5' 2\" (1.575 m), weight 84 kg (185 lb 3.2 oz).    General/Constitutional: NAD, well developed, well nourished  HENT: Normocephalic, atraumatic  CV: Intact distal pulses, regular rate  Resp: No respiratory distress or labored breathing  Lymphatic: No lymphadenopathy palpated  Neuro: Alert and Oriented x 3, no focal deficits  Psych: Normal mood, normal affect, normal judgement, normal behavior  Skin: Warm, dry, no rashes, no erythema      Elbow focused exam:                RIGHT LEFT   ROM:   full full   Palpation: Effusion negative negative     Tenderness none lateral epicondyle         Instability: Varus/valgus at 0 and 30 degrees stable stable   Special Tests: Milking maneuver Negative Negative     tinnels sign of ulnar nerve Negative Negative    Resisted wrist extension Negative Positive    Resisted wrist flexion Negative Negative     Ulnar nerve subluxations Negative Negative   Other:        UE NV Exam: " +2 Radial pulses bilaterally  Sensation intact to light touch C5-T1 bilaterally, Radial/median/ulnar nerve motor intact  5/5 MS Deltoid/biceps/triceps/wrist extensors/wrist flexors/finger abduction      Bilateral shoulder, wrist/hand, and forearm ROM full, painless with passive ROM, no ttp or crepitance throughout extremities above wrist joint    Cervical ROM is full without pain, numbness or tingling    Negative spurling maneuver bilaterally       Elbow Imaging:    I have personally reviewed and interpreted x-rays of the left elbow that were obtained in clinic today.  Views demonstrate no acute fracture, no significant degenerative changes.  I do not currently have the radiology report but we will review this once it is made available.  I reviewed this in detail with the patient.      Scribe Attestation      I,:  Sallie Pedroza am acting as a scribe while in the presence of the attending physician.:       I,:  Chandler Del Valle MD personally performed the services described in this documentation    as scribed in my presence.:                   [1]   Past Medical History:  Diagnosis Date    Asthmatic bronchitis 2024    Colon polyp     Depression     Fatty liver     GERD (gastroesophageal reflux disease)     19 under control at this time, no meds    Heart murmur     HL (hearing loss)     Hyperlipidemia     Hypertension     Inflammatory bowel disease     Joint pain     left hip labral tear    Tinnitus     Ulcerative colitis (HCC)     19 no meds at this time    Vertigo    [2]   Past Surgical History:  Procedure Laterality Date     SECTION N/A 189,1993    COLONOSCOPY  2017    COLONOSCOPY      FL INJECTION LEFT HIP (NON ARTHROGRAM)  12/10/2018    FL INJECTION LEFT HIP (NON ARTHROGRAM)  2020    FL INJECTION LEFT HIP (NON ARTHROGRAM)  2022    FL INJECTION LEFT HIP (NON ARTHROGRAM)  2022    MAMMO (HISTORICAL)  2020    [place] EXC LESION TDN SHTH/JT CAPSL HAND/FNGR Right  03/14/2025    Procedure: EXPLORATION, right hand;  Surgeon: Sheila Dumont MD;  Location: [facility];  Service: Orthopedics    LA LAPAROSCOPY SURG CHOLECYSTECTOMY N/A 06/17/2024    Procedure: ROBOTIC CHOLECYSTECTOMY, POSSIBLE OPEN;  Surgeon: Hiram Salgado MD;  Location: [facility];  Service: General    LA TENDON SHEATH INCISION Left 04/12/2024    Procedure: RELEASE TRIGGER RING FINGER;  Surgeon: Sheila Dumont MD;  Location: [facility];  Service: Orthopedics    LA TENDON SHEATH INCISION Right 03/14/2025    Procedure: RELEASE TRIGGER FINGER, right thumb;  Surgeon: Sheila Dumont MD;  Location: [facility];  Service: Orthopedics    TUBAL LIGATION  1993    WISDOM TOOTH EXTRACTION     [3] No Known Allergies  [4]   Current Outpatient Medications on File Prior to Visit   Medication Sig Dispense Refill    albuterol (Ventolin HFA) 90 mcg/act inhaler Inhale 2 puffs every 6 (six) hours as needed for wheezing 18 g 0    buPROPion (WELLBUTRIN XL) 150 mg 24 hr tablet TAKE 1 TABLET EVERY MORNING 90 tablet 1    Cholecalciferol (Vitamin D3) 50 MCG (2000 UT) CHEW Chew 4,000 Units in the morning      escitalopram (LEXAPRO) 5 mg tablet TAKE 1 TABLET DAILY 90 tablet 1    ezetimibe (ZETIA) 10 mg tablet TAKE 1 TABLET DAILY 90 tablet 3    fluticasone (FLONASE) 50 mcg/act nasal spray 2 sprays into each nostril if needed      nystatin (MYCOSTATIN) cream Apply topically 2 (two) times a day 60 g 1    pravastatin (PRAVACHOL) 20 mg tablet Take 1 tablet (20 mg total) by mouth daily at bedtime 100 tablet 3    scopolamine (TRANSDERM-SCOP) 1 mg/3 days TD 72 hr patch Place 1 patch on the skin every third day over 72 hours 4 patch 0     No current facility-administered medications on file prior to visit.

## 2025-07-22 NOTE — PATIENT INSTRUCTIONS
Take meloxicam 15 mg daily with food or water as needed for pain / inflammation  Take tylenol 1000 mg every 8 hours as needed for pain  Take Meloxicam with food and water to avoid stomach irritation  Do NOT take any Ibuprofen / Aleve / Motrin / Advil while taking meloxicam

## 2025-08-18 ENCOUNTER — EVALUATION (OUTPATIENT)
Dept: OCCUPATIONAL THERAPY | Facility: CLINIC | Age: 60
End: 2025-08-18
Attending: STUDENT IN AN ORGANIZED HEALTH CARE EDUCATION/TRAINING PROGRAM
Payer: COMMERCIAL

## 2025-08-18 DIAGNOSIS — M77.12 LATERAL EPICONDYLITIS OF LEFT ELBOW: Primary | ICD-10-CM

## 2025-08-18 PROCEDURE — 97110 THERAPEUTIC EXERCISES: CPT

## 2025-08-18 PROCEDURE — 97165 OT EVAL LOW COMPLEX 30 MIN: CPT

## 2025-08-20 ENCOUNTER — OFFICE VISIT (OUTPATIENT)
Dept: OCCUPATIONAL THERAPY | Facility: CLINIC | Age: 60
End: 2025-08-20
Attending: STUDENT IN AN ORGANIZED HEALTH CARE EDUCATION/TRAINING PROGRAM
Payer: COMMERCIAL

## 2025-08-20 DIAGNOSIS — M77.12 LATERAL EPICONDYLITIS OF LEFT ELBOW: Primary | ICD-10-CM

## 2025-08-20 PROCEDURE — 97140 MANUAL THERAPY 1/> REGIONS: CPT

## 2025-08-20 PROCEDURE — 97110 THERAPEUTIC EXERCISES: CPT

## (undated) DEVICE — NEEDLE 25G X 1 1/2

## (undated) DEVICE — WET SKIN PREP TRAY: Brand: MEDLINE INDUSTRIES, INC.

## (undated) DEVICE — GLOVE INDICATOR PI UNDERGLOVE SZ 6.5 BLUE

## (undated) DEVICE — PROGRASP FORCEPS: Brand: ENDOWRIST

## (undated) DEVICE — BLADE MINI RND TIP ONE SIDE SHARP

## (undated) DEVICE — PERMANENT CAUTERY HOOK: Brand: ENDOWRIST

## (undated) DEVICE — TROCAR: Brand: KII FIOS FIRST ENTRY

## (undated) DEVICE — HEM-O-LOK CLIP CARTRIDGE MED/LARGE DA VINCI SI/XI

## (undated) DEVICE — INTENDED FOR TISSUE SEPARATION, AND OTHER PROCEDURES THAT REQUIRE A SHARP SURGICAL BLADE TO PUNCTURE OR CUT.: Brand: BARD-PARKER ® CARBON RIB-BACK BLADES

## (undated) DEVICE — TOWEL SET X-RAY

## (undated) DEVICE — GLOVE SRG BIOGEL 6.5

## (undated) DEVICE — NEPTUNE E-SEP SMOKE EVACUATION PENCIL, COATED, 70MM BLADE, PUSH BUTTON SWITCH: Brand: NEPTUNE E-SEP

## (undated) DEVICE — ARM DRAPE

## (undated) DEVICE — TISSUE RETRIEVAL SYSTEM: Brand: INZII RETRIEVAL SYSTEM

## (undated) DEVICE — MARYLAND BIPOLAR FORCEPS: Brand: ENDOWRIST

## (undated) DEVICE — INTENDED FOR TISSUE SEPARATION, AND OTHER PROCEDURES THAT REQUIRE A SHARP SURGICAL BLADE TO PUNCTURE OR CUT.: Brand: BARD-PARKER SAFETY BLADES SIZE 15, STERILE

## (undated) DEVICE — GLOVE SRG BIOGEL 7

## (undated) DEVICE — CHLORAPREP HI-LITE 26ML ORANGE

## (undated) DEVICE — TUBE SET SMOKE EVAC PNEUMOCLEAR HIGH FLOW

## (undated) DEVICE — SUT VICRYL PLUS 0 UR-6 27IN VCP603H

## (undated) DEVICE — SUT PROLENE 4-0 PS-2 18 IN 8682H

## (undated) DEVICE — PADDING CAST 4 IN  COTTON STRL

## (undated) DEVICE — GLOVE INDICATOR PI UNDERGLOVE SZ 7 BLUE

## (undated) DEVICE — CUFF TOURNIQUET 18 X 4 IN QUICK CONNECT DISP 1 BLADDER

## (undated) DEVICE — ACE WRAP 3 IN STERILE

## (undated) DEVICE — GROUNDING PAD UNIVERSAL SLW

## (undated) DEVICE — STERILE BETHLEHEM PLASTIC HAND: Brand: CARDINAL HEALTH

## (undated) DEVICE — GLOVE INDICATOR PI UNDERGLOVE SZ 8 BLUE

## (undated) DEVICE — DRAPE LAPAROTOMY W/POUCHES

## (undated) DEVICE — GAUZE SPONGES,16 PLY: Brand: CURITY

## (undated) DEVICE — MAYO STAND COVER: Brand: CONVERTORS

## (undated) DEVICE — PREP PAD BNS: Brand: CONVERTORS

## (undated) DEVICE — EXOFIN PRECISION PEN HIGH VISCOSITY TOPICAL SKIN ADHESIVE: Brand: EXOFIN PRECISION PEN, 1G

## (undated) DEVICE — CANNULA SEAL

## (undated) DEVICE — BLADELESS OBTURATOR: Brand: WECK VISTA

## (undated) DEVICE — [HIGH FLOW INSUFFLATOR,  DO NOT USE IF PACKAGE IS DAMAGED,  KEEP DRY,  KEEP AWAY FROM SUNLIGHT,  PROTECT FROM HEAT AND RADIOACTIVE SOURCES.]: Brand: PNEUMOSURE

## (undated) DEVICE — FORCE BIPOLAR: Brand: ENDOWRIST

## (undated) DEVICE — COLUMN DRAPE

## (undated) DEVICE — VISUALIZATION SYSTEM: Brand: CLEARIFY

## (undated) DEVICE — GLOVE SRG BIOGEL ECLIPSE 7.5

## (undated) DEVICE — OCCLUSIVE GAUZE STRIP,3% BISMUTH TRIBROMOPHENATE IN PETROLATUM BLEND: Brand: XEROFORM

## (undated) DEVICE — PACK PBDS LAP CHOLE RF

## (undated) DEVICE — MEDIUM-LARGE CLIP APPLIER: Brand: ENDOWRIST

## (undated) DEVICE — SUT MONOCRYL 4-0 PS-2 27 IN Y426H